# Patient Record
Sex: MALE | Race: WHITE | NOT HISPANIC OR LATINO | Employment: FULL TIME | URBAN - METROPOLITAN AREA
[De-identification: names, ages, dates, MRNs, and addresses within clinical notes are randomized per-mention and may not be internally consistent; named-entity substitution may affect disease eponyms.]

---

## 2021-03-30 ENCOUNTER — BULK ORDERING (OUTPATIENT)
Dept: CASE MANAGEMENT | Facility: OTHER | Age: 56
End: 2021-03-30

## 2021-03-30 DIAGNOSIS — Z23 IMMUNIZATION DUE: ICD-10-CM

## 2023-10-25 ENCOUNTER — HOSPITAL ENCOUNTER (INPATIENT)
Facility: HOSPITAL | Age: 58
LOS: 10 days | Discharge: HOME | DRG: 372 | End: 2023-11-04
Attending: EMERGENCY MEDICINE | Admitting: INTERNAL MEDICINE
Payer: COMMERCIAL

## 2023-10-25 DIAGNOSIS — N17.9 ACUTE RENAL FAILURE, UNSPECIFIED ACUTE RENAL FAILURE TYPE: Primary | ICD-10-CM

## 2023-10-25 DIAGNOSIS — K52.9 GASTROENTERITIS: ICD-10-CM

## 2023-10-25 PROBLEM — R19.7 DIARRHEA: Status: ACTIVE | Noted: 2023-10-25

## 2023-10-25 LAB
ADV 40+41 DNA STL QL NAA+NON-PROBE: NOT DETECTED
ALBUMIN SERPL-MCNC: 4.8 G/DL (ref 3.5–5.2)
ALBUMIN/GLOB SERPL: 1.1 G/DL
ALP SERPL-CCNC: 75 U/L (ref 39–117)
ALT SERPL W P-5'-P-CCNC: 31 U/L (ref 1–41)
ANION GAP SERPL CALCULATED.3IONS-SCNC: 17.5 MMOL/L (ref 5–15)
AST SERPL-CCNC: 17 U/L (ref 1–40)
ASTRO TYP 1-8 RNA STL QL NAA+NON-PROBE: NOT DETECTED
BASOPHILS # BLD AUTO: 0.01 10*3/MM3 (ref 0–0.2)
BASOPHILS NFR BLD AUTO: 0.1 % (ref 0–1.5)
BILIRUB SERPL-MCNC: 0.8 MG/DL (ref 0–1.2)
BUN SERPL-MCNC: 56 MG/DL (ref 6–20)
BUN/CREAT SERPL: 10.7 (ref 7–25)
C CAYETANENSIS DNA STL QL NAA+NON-PROBE: NOT DETECTED
C COLI+JEJ+UPSA DNA STL QL NAA+NON-PROBE: NOT DETECTED
CALCIUM SPEC-SCNC: 9.7 MG/DL (ref 8.6–10.5)
CHLORIDE SERPL-SCNC: 95 MMOL/L (ref 98–107)
CO2 SERPL-SCNC: 15.5 MMOL/L (ref 22–29)
CREAT SERPL-MCNC: 5.23 MG/DL (ref 0.76–1.27)
CRYPTOSP DNA STL QL NAA+NON-PROBE: NOT DETECTED
D-LACTATE SERPL-SCNC: 2 MMOL/L (ref 0.5–2)
D-LACTATE SERPL-SCNC: 2.7 MMOL/L (ref 0.5–2)
DEPRECATED RDW RBC AUTO: 43.3 FL (ref 37–54)
E HISTOLYT DNA STL QL NAA+NON-PROBE: NOT DETECTED
EAEC PAA PLAS AGGR+AATA ST NAA+NON-PRB: NOT DETECTED
EC STX1+STX2 GENES STL QL NAA+NON-PROBE: NOT DETECTED
EGFRCR SERPLBLD CKD-EPI 2021: 12.1 ML/MIN/1.73
EOSINOPHIL # BLD AUTO: 0 10*3/MM3 (ref 0–0.4)
EOSINOPHIL NFR BLD AUTO: 0 % (ref 0.3–6.2)
EPEC EAE GENE STL QL NAA+NON-PROBE: NOT DETECTED
ERYTHROCYTE [DISTWIDTH] IN BLOOD BY AUTOMATED COUNT: 12.9 % (ref 12.3–15.4)
ETEC LTA+ST1A+ST1B TOX ST NAA+NON-PROBE: NOT DETECTED
G LAMBLIA DNA STL QL NAA+NON-PROBE: NOT DETECTED
GLOBULIN UR ELPH-MCNC: 4.2 GM/DL
GLUCOSE SERPL-MCNC: 160 MG/DL (ref 65–99)
HCT VFR BLD AUTO: 53.7 % (ref 37.5–51)
HGB BLD-MCNC: 18.4 G/DL (ref 13–17.7)
IMM GRANULOCYTES # BLD AUTO: 0.03 10*3/MM3 (ref 0–0.05)
IMM GRANULOCYTES NFR BLD AUTO: 0.4 % (ref 0–0.5)
LIPASE SERPL-CCNC: 32 U/L (ref 13–60)
LYMPHOCYTES # BLD AUTO: 0.86 10*3/MM3 (ref 0.7–3.1)
LYMPHOCYTES NFR BLD AUTO: 10.3 % (ref 19.6–45.3)
MCH RBC QN AUTO: 30.6 PG (ref 26.6–33)
MCHC RBC AUTO-ENTMCNC: 34.3 G/DL (ref 31.5–35.7)
MCV RBC AUTO: 89.2 FL (ref 79–97)
MONOCYTES # BLD AUTO: 1.17 10*3/MM3 (ref 0.1–0.9)
MONOCYTES NFR BLD AUTO: 13.9 % (ref 5–12)
NEUTROPHILS NFR BLD AUTO: 6.32 10*3/MM3 (ref 1.7–7)
NEUTROPHILS NFR BLD AUTO: 75.3 % (ref 42.7–76)
NOROVIRUS GI+II RNA STL QL NAA+NON-PROBE: NOT DETECTED
P SHIGELLOIDES DNA STL QL NAA+NON-PROBE: NOT DETECTED
PLATELET # BLD AUTO: 360 10*3/MM3 (ref 140–450)
PMV BLD AUTO: 9.9 FL (ref 6–12)
POTASSIUM SERPL-SCNC: 3.4 MMOL/L (ref 3.5–5.2)
PROT SERPL-MCNC: 9 G/DL (ref 6–8.5)
RBC # BLD AUTO: 6.02 10*6/MM3 (ref 4.14–5.8)
RVA RNA STL QL NAA+NON-PROBE: NOT DETECTED
S ENT+BONG DNA STL QL NAA+NON-PROBE: DETECTED
SAPO I+II+IV+V RNA STL QL NAA+NON-PROBE: NOT DETECTED
SHIGELLA SP+EIEC IPAH ST NAA+NON-PROBE: NOT DETECTED
SODIUM SERPL-SCNC: 128 MMOL/L (ref 136–145)
V CHOL+PARA+VUL DNA STL QL NAA+NON-PROBE: NOT DETECTED
V CHOLERAE DNA STL QL NAA+NON-PROBE: NOT DETECTED
WBC NRBC COR # BLD: 8.39 10*3/MM3 (ref 3.4–10.8)
Y ENTEROCOL DNA STL QL NAA+NON-PROBE: NOT DETECTED

## 2023-10-25 PROCEDURE — 36415 COLL VENOUS BLD VENIPUNCTURE: CPT

## 2023-10-25 PROCEDURE — 87081 CULTURE SCREEN ONLY: CPT | Performed by: INTERNAL MEDICINE

## 2023-10-25 PROCEDURE — 83605 ASSAY OF LACTIC ACID: CPT | Performed by: PHYSICIAN ASSISTANT

## 2023-10-25 PROCEDURE — 83690 ASSAY OF LIPASE: CPT | Performed by: PHYSICIAN ASSISTANT

## 2023-10-25 PROCEDURE — 85025 COMPLETE CBC W/AUTO DIFF WBC: CPT | Performed by: PHYSICIAN ASSISTANT

## 2023-10-25 PROCEDURE — 25810000003 LACTATED RINGERS PER 1000 ML: Performed by: INTERNAL MEDICINE

## 2023-10-25 PROCEDURE — 99283 EMERGENCY DEPT VISIT LOW MDM: CPT | Performed by: PHYSICIAN ASSISTANT

## 2023-10-25 PROCEDURE — 80053 COMPREHEN METABOLIC PANEL: CPT | Performed by: PHYSICIAN ASSISTANT

## 2023-10-25 PROCEDURE — 99285 EMERGENCY DEPT VISIT HI MDM: CPT

## 2023-10-25 PROCEDURE — 25010000002 METOCLOPRAMIDE PER 10 MG: Performed by: PHYSICIAN ASSISTANT

## 2023-10-25 PROCEDURE — 87507 IADNA-DNA/RNA PROBE TQ 12-25: CPT | Performed by: INTERNAL MEDICINE

## 2023-10-25 PROCEDURE — 25810000003 LACTATED RINGERS PER 1000 ML: Performed by: PHYSICIAN ASSISTANT

## 2023-10-25 PROCEDURE — 25810000003 SODIUM CHLORIDE 0.9 % SOLUTION: Performed by: PHYSICIAN ASSISTANT

## 2023-10-25 PROCEDURE — 25010000002 DIPHENHYDRAMINE PER 50 MG: Performed by: PHYSICIAN ASSISTANT

## 2023-10-25 RX ORDER — METOCLOPRAMIDE HYDROCHLORIDE 5 MG/ML
10 INJECTION INTRAMUSCULAR; INTRAVENOUS ONCE
Status: COMPLETED | OUTPATIENT
Start: 2023-10-25 | End: 2023-10-25

## 2023-10-25 RX ORDER — DIPHENHYDRAMINE HYDROCHLORIDE 50 MG/ML
25 INJECTION INTRAMUSCULAR; INTRAVENOUS ONCE
Status: COMPLETED | OUTPATIENT
Start: 2023-10-25 | End: 2023-10-25

## 2023-10-25 RX ORDER — ONDANSETRON HYDROCHLORIDE 8 MG/1
8 TABLET, FILM COATED ORAL EVERY 8 HOURS PRN
COMMUNITY

## 2023-10-25 RX ORDER — ACETAMINOPHEN 650 MG/1
650 SUPPOSITORY RECTAL EVERY 4 HOURS PRN
Status: DISCONTINUED | OUTPATIENT
Start: 2023-10-25 | End: 2023-11-04 | Stop reason: HOSPADM

## 2023-10-25 RX ORDER — ACETAMINOPHEN 160 MG/5ML
650 SOLUTION ORAL EVERY 4 HOURS PRN
Status: DISCONTINUED | OUTPATIENT
Start: 2023-10-25 | End: 2023-11-04 | Stop reason: HOSPADM

## 2023-10-25 RX ORDER — SODIUM CHLORIDE 0.9 % (FLUSH) 0.9 %
10 SYRINGE (ML) INJECTION AS NEEDED
Status: DISCONTINUED | OUTPATIENT
Start: 2023-10-25 | End: 2023-11-04 | Stop reason: HOSPADM

## 2023-10-25 RX ORDER — HYDROCODONE BITARTRATE AND ACETAMINOPHEN 5; 325 MG/1; MG/1
1 TABLET ORAL EVERY 4 HOURS PRN
Status: ACTIVE | OUTPATIENT
Start: 2023-10-25 | End: 2023-11-01

## 2023-10-25 RX ORDER — SODIUM CHLORIDE, SODIUM LACTATE, POTASSIUM CHLORIDE, CALCIUM CHLORIDE 600; 310; 30; 20 MG/100ML; MG/100ML; MG/100ML; MG/100ML
1000 INJECTION, SOLUTION INTRAVENOUS ONCE
Status: COMPLETED | OUTPATIENT
Start: 2023-10-25 | End: 2023-10-25

## 2023-10-25 RX ORDER — ONDANSETRON 4 MG/1
4 TABLET, FILM COATED ORAL EVERY 6 HOURS PRN
Status: DISCONTINUED | OUTPATIENT
Start: 2023-10-25 | End: 2023-11-04 | Stop reason: HOSPADM

## 2023-10-25 RX ORDER — NITROGLYCERIN 0.4 MG/1
0.4 TABLET SUBLINGUAL
Status: DISCONTINUED | OUTPATIENT
Start: 2023-10-25 | End: 2023-11-04 | Stop reason: HOSPADM

## 2023-10-25 RX ORDER — SODIUM CHLORIDE 0.9 % (FLUSH) 0.9 %
10 SYRINGE (ML) INJECTION EVERY 12 HOURS SCHEDULED
Status: DISCONTINUED | OUTPATIENT
Start: 2023-10-25 | End: 2023-11-04 | Stop reason: HOSPADM

## 2023-10-25 RX ORDER — SODIUM CHLORIDE 9 MG/ML
40 INJECTION, SOLUTION INTRAVENOUS AS NEEDED
Status: DISCONTINUED | OUTPATIENT
Start: 2023-10-25 | End: 2023-11-04 | Stop reason: HOSPADM

## 2023-10-25 RX ORDER — SODIUM CHLORIDE, SODIUM LACTATE, POTASSIUM CHLORIDE, CALCIUM CHLORIDE 600; 310; 30; 20 MG/100ML; MG/100ML; MG/100ML; MG/100ML
100 INJECTION, SOLUTION INTRAVENOUS CONTINUOUS
Status: DISCONTINUED | OUTPATIENT
Start: 2023-10-25 | End: 2023-10-28

## 2023-10-25 RX ORDER — ONDANSETRON 2 MG/ML
4 INJECTION INTRAMUSCULAR; INTRAVENOUS EVERY 6 HOURS PRN
Status: DISCONTINUED | OUTPATIENT
Start: 2023-10-25 | End: 2023-11-04 | Stop reason: HOSPADM

## 2023-10-25 RX ORDER — ACETAMINOPHEN 325 MG/1
650 TABLET ORAL EVERY 4 HOURS PRN
Status: DISCONTINUED | OUTPATIENT
Start: 2023-10-25 | End: 2023-11-04 | Stop reason: HOSPADM

## 2023-10-25 RX ADMIN — DIPHENHYDRAMINE HYDROCHLORIDE 25 MG: 50 INJECTION, SOLUTION INTRAMUSCULAR; INTRAVENOUS at 13:41

## 2023-10-25 RX ADMIN — ACETAMINOPHEN 650 MG: 325 TABLET, FILM COATED ORAL at 20:43

## 2023-10-25 RX ADMIN — METOCLOPRAMIDE 10 MG: 5 INJECTION, SOLUTION INTRAMUSCULAR; INTRAVENOUS at 13:40

## 2023-10-25 RX ADMIN — SODIUM CHLORIDE, POTASSIUM CHLORIDE, SODIUM LACTATE AND CALCIUM CHLORIDE 100 ML/HR: 600; 310; 30; 20 INJECTION, SOLUTION INTRAVENOUS at 18:50

## 2023-10-25 RX ADMIN — SODIUM CHLORIDE 2000 ML: 9 INJECTION, SOLUTION INTRAVENOUS at 13:41

## 2023-10-25 RX ADMIN — SODIUM CHLORIDE, POTASSIUM CHLORIDE, SODIUM LACTATE AND CALCIUM CHLORIDE 1000 ML: 600; 310; 30; 20 INJECTION, SOLUTION INTRAVENOUS at 14:41

## 2023-10-25 NOTE — FS[facility] PROVIDER NOTE
Subjective   History of Present Illness  Patient is a healthy 58-year-old that has had vomiting and diarrhea that began 3 days ago.  He was given Zofran on a telehealth visit and that has helped with the vomiting tremendously.  He is having copious watery diarrhea.  He denies any recent travel or antibiotics.  He has never had C. difficile.  He has had normal cramping he would expect from gastroenteritis but says he is not having abnormal abdominal pain.  He feels very dehydrated and is having a lot of muscle aches especially in his legs.  He has had chills and sweats but has not checked for fever.  He says he is lost 12 pounds in these last 4 days.      Review of Systems   Constitutional:  Positive for chills and diaphoresis.   HENT: Negative.     Respiratory: Negative.     Cardiovascular: Negative.    Gastrointestinal:  Positive for diarrhea, nausea and vomiting. Negative for abdominal pain and blood in stool.   Genitourinary:  Positive for decreased urine volume.   Musculoskeletal:  Positive for myalgias.   Neurological:  Positive for weakness.   All other systems reviewed and are negative.      History reviewed. No pertinent past medical history.    No Known Allergies    Past Surgical History:   Procedure Laterality Date    FACIAL FRACTURE SURGERY      SHOULDER SURGERY         Family History   Problem Relation Age of Onset    Arthritis Father        Social History     Socioeconomic History    Marital status:    Tobacco Use    Smoking status: Never    Smokeless tobacco: Never   Substance and Sexual Activity    Alcohol use: Yes           Objective   Physical Exam  Constitutional:       Appearance: Normal appearance. He is normal weight.   HENT:      Head: Normocephalic.      Mouth/Throat:      Mouth: Mucous membranes are dry.   Cardiovascular:      Rate and Rhythm: Tachycardia present.   Pulmonary:      Effort: Pulmonary effort is normal.      Breath sounds: Normal breath sounds.   Abdominal:      General:  Bowel sounds are normal.      Tenderness: There is no abdominal tenderness. There is no right CVA tenderness, left CVA tenderness or guarding.   Musculoskeletal:         General: Normal range of motion.      Cervical back: Normal range of motion.   Skin:     General: Skin is warm and dry.   Neurological:      Mental Status: He is alert.   Psychiatric:         Mood and Affect: Mood normal.         Behavior: Behavior normal.         Procedures           ED Course                                           Medical Decision Making  Presentation patient appears shaky and dehydrated.  His heart rate is about 105.  Oxygen 96% on room air.  Blood pressure 130/95.  He is afebrile.  He is in renal failure.  Potassium is 3.4.  He appears hemoconcentrated.  He is not having any unusual abdominal pain and has a soft benign abdominal exam, CT not ordered.    I started him on 2 L normal saline initially and have switched that to LR beginning with the third liter.  He says the Benadryl and Reglan have helped.  He has some oral Zofran and asked if he could take that.  I told him he is welcome to for his own comfort but we also have IV Zofran.    He has no past medical history including no history of renal failure.  He sees Dr. Varghese.    Have spoken to Dr. Benjy Leon over the telephone and he accepts patient.  Patient is stable and is waiting for ambulance for transport.        Problems Addressed:  Acute renal failure, unspecified acute renal failure type: complicated acute illness or injury  Gastroenteritis: complicated acute illness or injury    Amount and/or Complexity of Data Reviewed  Labs: ordered.    Risk  Prescription drug management.  Decision regarding hospitalization.        Final diagnoses:   Acute renal failure, unspecified acute renal failure type   Gastroenteritis       ED Disposition  ED Disposition       ED Disposition   Decision to Admit    Condition   --    Comment   Level of Care: Telemetry [5]   Diagnosis: ARF  (acute renal failure) [135004]   Admitting Physician: JAZMIN DEUTSCH [[id number]]   Attending Physician: JAZMIN DEUTSCH [[id number]]   Isolate for COVID?: No [0]   Certification: I Certify That Inpatient Hospital Services Are Medically Necessary For Greater Than 2 Midnights                 No follow-up provider specified.       Medication List      No changes were made to your prescriptions during this visit.

## 2023-10-25 NOTE — PLAN OF CARE
Goal Outcome Evaluation:  Plan of Care Reviewed With: patient, spouse        Progress: no change  Outcome Evaluation: Admitted to the floor this shift with c/o NVD. Abdominal discomfort noted. Intermittent nausea, clear liquids provided. No skin issues noted. IVF. Regular diet. RA. A&Ox4. SOA with activity. Urinal at bedside. Awaiting urine and stool sample. VSS.

## 2023-10-25 NOTE — PROGRESS NOTES
Chart reviewed. Jerrod, hyponatremia, hypokalemia and metabolic acidosis. Likely secondary to dehydration. Agree with current ivf. Will f/u with labs in am

## 2023-10-26 PROBLEM — R11.2 NAUSEA AND VOMITING: Status: ACTIVE | Noted: 2023-10-26

## 2023-10-26 PROBLEM — E86.0 DEHYDRATION: Status: ACTIVE | Noted: 2023-10-26

## 2023-10-26 PROBLEM — A04.4 E COLI ENTERITIS: Status: ACTIVE | Noted: 2023-10-26

## 2023-10-26 LAB
ALBUMIN SERPL-MCNC: 4.3 G/DL (ref 3.5–5.2)
ALBUMIN/GLOB SERPL: 1.2 G/DL
ALP SERPL-CCNC: 58 U/L (ref 39–117)
ALT SERPL W P-5'-P-CCNC: 28 U/L (ref 1–41)
ANION GAP SERPL CALCULATED.3IONS-SCNC: 15.2 MMOL/L (ref 5–15)
AST SERPL-CCNC: 18 U/L (ref 1–40)
BACTERIA UR QL AUTO: ABNORMAL /HPF
BASOPHILS # BLD AUTO: 0.02 10*3/MM3 (ref 0–0.2)
BASOPHILS NFR BLD AUTO: 0.3 % (ref 0–1.5)
BILIRUB SERPL-MCNC: 0.9 MG/DL (ref 0–1.2)
BILIRUB UR QL STRIP: NEGATIVE
BUN SERPL-MCNC: 55 MG/DL (ref 6–20)
BUN/CREAT SERPL: 19.6 (ref 7–25)
CALCIUM SPEC-SCNC: 8.9 MG/DL (ref 8.6–10.5)
CHLORIDE SERPL-SCNC: 96 MMOL/L (ref 98–107)
CHLORIDE UR-SCNC: <20 MMOL/L
CK SERPL-CCNC: 163 U/L (ref 20–200)
CLARITY UR: CLEAR
CO2 SERPL-SCNC: 21.8 MMOL/L (ref 22–29)
COLOR UR: YELLOW
CREAT SERPL-MCNC: 2.81 MG/DL (ref 0.76–1.27)
CREAT UR-MCNC: 234.8 MG/DL
DEPRECATED RDW RBC AUTO: 43.4 FL (ref 37–54)
EGFRCR SERPLBLD CKD-EPI 2021: 25.6 ML/MIN/1.73
EOSINOPHIL # BLD AUTO: 0 10*3/MM3 (ref 0–0.4)
EOSINOPHIL NFR BLD AUTO: 0 % (ref 0.3–6.2)
ERYTHROCYTE [DISTWIDTH] IN BLOOD BY AUTOMATED COUNT: 13.3 % (ref 12.3–15.4)
GLOBULIN UR ELPH-MCNC: 3.5 GM/DL
GLUCOSE SERPL-MCNC: 114 MG/DL (ref 65–99)
GLUCOSE UR STRIP-MCNC: NEGATIVE MG/DL
HBA1C MFR BLD: 6 % (ref 4.8–5.6)
HCT VFR BLD AUTO: 48.8 % (ref 37.5–51)
HGB BLD-MCNC: 17 G/DL (ref 13–17.7)
HGB UR QL STRIP.AUTO: ABNORMAL
HYALINE CASTS UR QL AUTO: ABNORMAL /LPF
KETONES UR QL STRIP: NEGATIVE
LEUKOCYTE ESTERASE UR QL STRIP.AUTO: NEGATIVE
LYMPHOCYTES # BLD AUTO: 0.56 10*3/MM3 (ref 0.7–3.1)
LYMPHOCYTES NFR BLD AUTO: 7.6 % (ref 19.6–45.3)
MAGNESIUM SERPL-MCNC: 1.8 MG/DL (ref 1.6–2.6)
MCH RBC QN AUTO: 31.1 PG (ref 26.6–33)
MCHC RBC AUTO-ENTMCNC: 34.8 G/DL (ref 31.5–35.7)
MCV RBC AUTO: 89.4 FL (ref 79–97)
MONOCYTES # BLD AUTO: 1.1 10*3/MM3 (ref 0.1–0.9)
MONOCYTES NFR BLD AUTO: 15 % (ref 5–12)
MUCOUS THREADS URNS QL MICRO: ABNORMAL /HPF
NEUTROPHILS NFR BLD AUTO: 5.63 10*3/MM3 (ref 1.7–7)
NEUTROPHILS NFR BLD AUTO: 76.7 % (ref 42.7–76)
NITRITE UR QL STRIP: NEGATIVE
OSMOLALITY UR: 736 MOSM/KG
PH UR STRIP.AUTO: 5.5 [PH] (ref 5–8)
PHOSPHATE SERPL-MCNC: 4.4 MG/DL (ref 2.5–4.5)
PLATELET # BLD AUTO: 297 10*3/MM3 (ref 140–450)
PMV BLD AUTO: 10.2 FL (ref 6–12)
POTASSIUM SERPL-SCNC: 3.1 MMOL/L (ref 3.5–5.2)
POTASSIUM SERPL-SCNC: 3.2 MMOL/L (ref 3.5–5.2)
PROT ?TM UR-MCNC: 78.5 MG/DL
PROT SERPL-MCNC: 7.8 G/DL (ref 6–8.5)
PROT UR QL STRIP: ABNORMAL
RBC # BLD AUTO: 5.46 10*6/MM3 (ref 4.14–5.8)
RBC # UR STRIP: ABNORMAL /HPF
REF LAB TEST METHOD: ABNORMAL
SODIUM SERPL-SCNC: 133 MMOL/L (ref 136–145)
SODIUM UR-SCNC: 20 MMOL/L
SP GR UR STRIP: 1.02 (ref 1–1.03)
SQUAMOUS #/AREA URNS HPF: ABNORMAL /HPF
TSH SERPL DL<=0.05 MIU/L-ACNC: 2.1 UIU/ML (ref 0.27–4.2)
UROBILINOGEN UR QL STRIP: ABNORMAL
WBC # UR STRIP: ABNORMAL /HPF
WBC NRBC COR # BLD: 7.34 10*3/MM3 (ref 3.4–10.8)

## 2023-10-26 PROCEDURE — 82550 ASSAY OF CK (CPK): CPT | Performed by: INTERNAL MEDICINE

## 2023-10-26 PROCEDURE — 80050 GENERAL HEALTH PANEL: CPT | Performed by: INTERNAL MEDICINE

## 2023-10-26 PROCEDURE — 83036 HEMOGLOBIN GLYCOSYLATED A1C: CPT | Performed by: INTERNAL MEDICINE

## 2023-10-26 PROCEDURE — 87181 SC STD AGAR DILUTION PER AGT: CPT | Performed by: INTERNAL MEDICINE

## 2023-10-26 PROCEDURE — 84300 ASSAY OF URINE SODIUM: CPT | Performed by: INTERNAL MEDICINE

## 2023-10-26 PROCEDURE — 25010000002 HEPARIN (PORCINE) PER 1000 UNITS: Performed by: INTERNAL MEDICINE

## 2023-10-26 PROCEDURE — 81001 URINALYSIS AUTO W/SCOPE: CPT | Performed by: INTERNAL MEDICINE

## 2023-10-26 PROCEDURE — 84132 ASSAY OF SERUM POTASSIUM: CPT | Performed by: INTERNAL MEDICINE

## 2023-10-26 PROCEDURE — 84156 ASSAY OF PROTEIN URINE: CPT | Performed by: INTERNAL MEDICINE

## 2023-10-26 PROCEDURE — 84100 ASSAY OF PHOSPHORUS: CPT | Performed by: INTERNAL MEDICINE

## 2023-10-26 PROCEDURE — 87040 BLOOD CULTURE FOR BACTERIA: CPT | Performed by: INTERNAL MEDICINE

## 2023-10-26 PROCEDURE — 82570 ASSAY OF URINE CREATININE: CPT | Performed by: INTERNAL MEDICINE

## 2023-10-26 PROCEDURE — 82436 ASSAY OF URINE CHLORIDE: CPT | Performed by: INTERNAL MEDICINE

## 2023-10-26 PROCEDURE — 87150 DNA/RNA AMPLIFIED PROBE: CPT | Performed by: INTERNAL MEDICINE

## 2023-10-26 PROCEDURE — 83735 ASSAY OF MAGNESIUM: CPT | Performed by: INTERNAL MEDICINE

## 2023-10-26 PROCEDURE — 87077 CULTURE AEROBIC IDENTIFY: CPT | Performed by: INTERNAL MEDICINE

## 2023-10-26 PROCEDURE — 83935 ASSAY OF URINE OSMOLALITY: CPT | Performed by: INTERNAL MEDICINE

## 2023-10-26 PROCEDURE — 25810000003 LACTATED RINGERS PER 1000 ML: Performed by: INTERNAL MEDICINE

## 2023-10-26 PROCEDURE — 87186 SC STD MICRODIL/AGAR DIL: CPT | Performed by: INTERNAL MEDICINE

## 2023-10-26 RX ORDER — POTASSIUM CHLORIDE 750 MG/1
40 TABLET, FILM COATED, EXTENDED RELEASE ORAL ONCE
Status: COMPLETED | OUTPATIENT
Start: 2023-10-26 | End: 2023-10-26

## 2023-10-26 RX ORDER — HEPARIN SODIUM 5000 [USP'U]/ML
5000 INJECTION, SOLUTION INTRAVENOUS; SUBCUTANEOUS EVERY 8 HOURS SCHEDULED
Status: DISCONTINUED | OUTPATIENT
Start: 2023-10-26 | End: 2023-11-01

## 2023-10-26 RX ORDER — CHOLESTYRAMINE 4 G/9G
1 POWDER, FOR SUSPENSION ORAL EVERY 8 HOURS SCHEDULED
Status: DISCONTINUED | OUTPATIENT
Start: 2023-10-26 | End: 2023-10-29

## 2023-10-26 RX ADMIN — Medication 10 ML: at 00:02

## 2023-10-26 RX ADMIN — HEPARIN SODIUM 5000 UNITS: 5000 INJECTION INTRAVENOUS; SUBCUTANEOUS at 17:39

## 2023-10-26 RX ADMIN — CHOLESTYRAMINE 1 PACKET: 4 POWDER, FOR SUSPENSION ORAL at 18:56

## 2023-10-26 RX ADMIN — HEPARIN SODIUM 5000 UNITS: 5000 INJECTION INTRAVENOUS; SUBCUTANEOUS at 21:31

## 2023-10-26 RX ADMIN — Medication 10 ML: at 21:31

## 2023-10-26 RX ADMIN — POTASSIUM CHLORIDE 40 MEQ: 750 TABLET, EXTENDED RELEASE ORAL at 08:52

## 2023-10-26 RX ADMIN — SODIUM CHLORIDE, POTASSIUM CHLORIDE, SODIUM LACTATE AND CALCIUM CHLORIDE 100 ML/HR: 600; 310; 30; 20 INJECTION, SOLUTION INTRAVENOUS at 23:33

## 2023-10-26 RX ADMIN — Medication 10 ML: at 08:52

## 2023-10-26 RX ADMIN — SODIUM CHLORIDE, POTASSIUM CHLORIDE, SODIUM LACTATE AND CALCIUM CHLORIDE 100 ML/HR: 600; 310; 30; 20 INJECTION, SOLUTION INTRAVENOUS at 14:30

## 2023-10-26 RX ADMIN — SODIUM CHLORIDE, POTASSIUM CHLORIDE, SODIUM LACTATE AND CALCIUM CHLORIDE 100 ML/HR: 600; 310; 30; 20 INJECTION, SOLUTION INTRAVENOUS at 04:11

## 2023-10-26 RX ADMIN — POTASSIUM CHLORIDE 40 MEQ: 750 TABLET, EXTENDED RELEASE ORAL at 17:39

## 2023-10-26 NOTE — CONSULTS
[facility]                                                                                             Nephrology Initial Consult Note    Patient Identification:  Name: Luisito Burton MRN: [id number]  Age: 58 y.o. : 1965  Sex: male  Date:10/26/2023    Requesting Physician: As per consult order.  Reason for Consultation: Acute kidney injury, metabolic acidosis, dehydration  Information from:patient/ family/ chart      History of Present Illness: This is a 58 y.o. year old male with no prior known history of chronic kidney disease or evaluation by nephrology.  His creatinine was 1.1 in January which appears to be his baseline, 0.98 last year.  He is not on any home medications, does have a history of hypertension that is being managed by primary care.  Blood pressures been 1 30-1 40 at his recent office visits.  It appears patient has been reluctant to start antihypertensive medications.  He came to the ER last night complaining of diarrhea for the last 4 days, nonbloody associated with nausea vomiting and severely decreased oral intake, unable to get down liquids due to nausea.  Outpatient Zofran ineffective, also complained of muscle cramping.  He is not on any NSAIDs, no recent IV contrast, BP soft in the ER, 92/74 but improved with fluid boluses.  Initial labs showed a creatinine of 5.2, sodium 128, potassium 3.4 and CO2 15.  He was started on IV fluids overnight with marked improvement in his chemistries.  Creatinine today 2.8, lactate is decreasing.  Initial CBC showed hemoconcentration but that has improved as well with IV fluids.  UA showed some proteinuria but it was a very concentrated specimen and will need to be repeated.    Stool GI PCR was positive for Salmonella    The following medical history and medications personally reviewed by me:    Problem List:     ARF (acute renal failure)     Diarrhea      Past Medical History:  History reviewed. No pertinent past medical history.    Past Surgical History:  Past Surgical History:   Procedure Laterality Date    FACIAL FRACTURE SURGERY      SHOULDER SURGERY          Home Meds:   Medications Prior to Admission   Medication Sig Dispense Refill Last Dose    ondansetron (ZOFRAN) 8 MG tablet Take 1 tablet by mouth Every 8 (Eight) Hours As Needed for Nausea or Vomiting.   10/25/2023       Current Meds:   Current Facility-Administered Medications   Medication Dose Route Frequency Provider Last Rate Last Admin    acetaminophen (TYLENOL) tablet 650 mg  650 mg Oral Q4H PRN Benjy Guevara MD   650 mg at 10/25/23 2043    Or    acetaminophen (TYLENOL) 160 MG/5ML oral solution 650 mg  650 mg Oral Q4H PRN Benjy Guevara MD        Or    acetaminophen (TYLENOL) suppository 650 mg  650 mg Rectal Q4H PRN Benjy Guevara MD        HYDROcodone-acetaminophen (NORCO) 5-325 MG per tablet 1 tablet  1 tablet Oral Q4H PRN Benjy Guevara MD        lactated ringers infusion  100 mL/hr Intravenous Continuous Benjy Guevara  mL/hr at 10/26/23 0411 100 mL/hr at 10/26/23 0411    nitroglycerin (NITROSTAT) SL tablet 0.4 mg  0.4 mg Sublingual Q5 Min PRN Benjy Guevara MD        ondansetron (ZOFRAN) tablet 4 mg  4 mg Oral Q6H PRN Benjy Guevara MD        Or    ondansetron (ZOFRAN) injection 4 mg  4 mg Intravenous Q6H PRN Benjy Guevara MD        sodium chloride 0.9 % flush 10 mL  10 mL Intravenous Q12H Benjy Guevara MD   10 mL at 10/26/23 0002    sodium chloride 0.9 % flush 10 mL  10 mL Intravenous PRN Benjy Guevara MD        sodium chloride 0.9 % infusion 40 mL  40 mL Intravenous PRN Benjy Guevara MD           Allergies:  No Known Allergies    Social History:   Social History     Socioeconomic History    Marital status:    Tobacco Use    Smoking status: Never    Smokeless tobacco: Never   Vaping Use    Vaping Use: Never  "used   Substance and Sexual Activity    Alcohol use: Yes    Drug use: Never    Sexual activity: Defer        Family History:  Family History   Problem Relation Age of Onset    Arthritis Father         Review of Systems: as per HPI, in addition:    General:      Complains of weakness / fatigue,                       No fevers / chills                       no weight loss  HEENT:       no dysphagia / odynophagia  Neck:           normal range of motion, no swelling  Respiratory: no cough / congestion                      No shortness of air                       No wheezing  CV:              No chest pain                       No palpitations  Abdomen/GI: + Nausea vomiting and abdominal pain :             no dysuria / urinary frequency                       No urgency, normal output  Endocrine:   no polyuria / polydipsia,                      No heat or cold intolerance  Skin:           no rashes or skin breakdown   Vascular:   No edema                     No claudication  Psych:        no depression/ anxiety  Neuro:        no focal weakness, no seizures  Musculoskeletal: no joint pain or deformities      Physical Exam:  Vitals:   Temp (24hrs), Av.8 °F (36.6 °C), Min:97.5 °F (36.4 °C), Max:98.4 °F (36.9 °C)    /75 (BP Location: Right arm, Patient Position: Lying)   Pulse 111   Temp 98.4 °F (36.9 °C) (Oral)   Resp 18   Ht 165.1 cm (65\")   Wt 77.1 kg (170 lb)   SpO2 94%   BMI 28.29 kg/m²   Intake/Output:   No intake or output data in the 24 hours ending 10/26/23 0808     Wt Readings from Last 1 Encounters:   10/25/23 1304 77.1 kg (170 lb)       Exam:    General Appearance:  Awake, alert, oriented x3, no acute distress  Mildly ill-appearing   Head and Face:  Normocephalic, atraumatic, mucus membranes moist, oropharynx clear   Eyes:  No icterus, pupils equal round and reactive to light, extraocular movements intact    ENMT: Moist mucosa, tongue symmetric    Neck: Supple  no jugular venous distention  no " thyromegaly   Pulmonary:  Respiratory effort: Normal  Auscultation of lungs: Clear bilaterally  No wheezes  No rhonchi  Good air movement, good expansion   Chest wall:  No tenderness or deformity   Cardiovascular:  Auscultation of the heart: Normal rhythm, no murmurs  No edema of bilateral lower extremities   Abdomen:  Abdomen: soft, non-tender, normal bowel sounds all four quadrants, no masses   Liver and spleen: no hepatosplenomegaly   Musculoskeletal: Digits and nails: normal  Normal range of motion  No joint swelling or gross deformities    Skin: Skin inspection: color normal, no visible rashes or lesions  Skin palpation: texture, turgor normal, no palpable lesions   Lymphatic:  no cervical lymphadenopathy    Psychiatric: Judgement and insight: normal  Orientation to person place and time: normal  Mood and affect: normal       DATA:  Radiology and Labs:  The following labs independently reviewed by me, additional AM labs ordered  Old records independently reviewed showing normal baseline creatinine around 1.0  The following radiologic studies independently viewed by me, findings chest x-ray showed low lung volumes, atelectasis  Interval notes, chart personally reviewed by me.  I have reviewed and summarized old records as detailed above  Plan of care discussed with patient himself at bedside  New problems include acute renal failure, metabolic acidosis, hypokalemia    Dialysis patient access: Not applicable    Risk/ complexity of medical care/ medical decision making: High complexity, severe renal failure with severe electrolyte abnormalities        Labs:   Recent Results (from the past 24 hour(s))   Comprehensive Metabolic Panel    Collection Time: 10/25/23  1:18 PM    Specimen: Blood   Result Value Ref Range    Glucose 160 (H) 65 - 99 mg/dL    BUN 56 (H) 6 - 20 mg/dL    Creatinine 5.23 (H) 0.76 - 1.27 mg/dL    Sodium 128 (L) 136 - 145 mmol/L    Potassium 3.4 (L) 3.5 - 5.2 mmol/L    Chloride 95 (L) 98 - 107  mmol/L    CO2 15.5 (L) 22.0 - 29.0 mmol/L    Calcium 9.7 8.6 - 10.5 mg/dL    Total Protein 9.0 (H) 6.0 - 8.5 g/dL    Albumin 4.8 3.5 - 5.2 g/dL    ALT (SGPT) 31 1 - 41 U/L    AST (SGOT) 17 1 - 40 U/L    Alkaline Phosphatase 75 39 - 117 U/L    Total Bilirubin 0.8 0.0 - 1.2 mg/dL    Globulin 4.2 gm/dL    A/G Ratio 1.1 g/dL    BUN/Creatinine Ratio 10.7 7.0 - 25.0    Anion Gap 17.5 (H) 5.0 - 15.0 mmol/L    eGFR 12.1 (L) >60.0 mL/min/1.73   Lipase    Collection Time: 10/25/23  1:18 PM    Specimen: Blood   Result Value Ref Range    Lipase 32 13 - 60 U/L   CBC Auto Differential    Collection Time: 10/25/23  1:18 PM    Specimen: Blood   Result Value Ref Range    WBC 8.39 3.40 - 10.80 10*3/mm3    RBC 6.02 (H) 4.14 - 5.80 10*6/mm3    Hemoglobin 18.4 (H) 13.0 - 17.7 g/dL    Hematocrit 53.7 (H) 37.5 - 51.0 %    MCV 89.2 79.0 - 97.0 fL    MCH 30.6 26.6 - 33.0 pg    MCHC 34.3 31.5 - 35.7 g/dL    RDW 12.9 12.3 - 15.4 %    RDW-SD 43.3 37.0 - 54.0 fl    MPV 9.9 6.0 - 12.0 fL    Platelets 360 140 - 450 10*3/mm3    Neutrophil % 75.3 42.7 - 76.0 %    Lymphocyte % 10.3 (L) 19.6 - 45.3 %    Monocyte % 13.9 (H) 5.0 - 12.0 %    Eosinophil % 0.0 (L) 0.3 - 6.2 %    Basophil % 0.1 0.0 - 1.5 %    Immature Grans % 0.4 0.0 - 0.5 %    Neutrophils, Absolute 6.32 1.70 - 7.00 10*3/mm3    Lymphocytes, Absolute 0.86 0.70 - 3.10 10*3/mm3    Monocytes, Absolute 1.17 (H) 0.10 - 0.90 10*3/mm3    Eosinophils, Absolute 0.00 0.00 - 0.40 10*3/mm3    Basophils, Absolute 0.01 0.00 - 0.20 10*3/mm3    Immature Grans, Absolute 0.03 0.00 - 0.05 10*3/mm3   Lactic Acid, Plasma    Collection Time: 10/25/23  2:41 PM    Specimen: Blood   Result Value Ref Range    Lactate 2.7 (C) 0.5 - 2.0 mmol/L   STAT Lactic Acid, Reflex    Collection Time: 10/25/23  6:25 PM    Specimen: Blood   Result Value Ref Range    Lactate 2.0 0.5 - 2.0 mmol/L   Gastrointestinal Panel, PCR - Stool, Per Rectum    Collection Time: 10/25/23  8:07 PM    Specimen: Per Rectum; Stool   Result Value Ref  Range    Campylobacter Not Detected Not Detected    Plesiomonas shigelloides Not Detected Not Detected    Salmonella Detected (A) Not Detected    Vibrio Not Detected Not Detected    Vibrio cholerae Not Detected Not Detected    Yersinia enterocolitica Not Detected Not Detected    Enteroaggregative E. coli (EAEC) Not Detected Not Detected    Enteropathogenic E. coli (EPEC) Not Detected Not Detected    Enterotoxigenic E. coli (ETEC) lt/st Not Detected Not Detected    Shiga-like toxin-producing E. coli (STEC) stx1/stx2 Not Detected Not Detected    Shigella/Enteroinvasive E. coli (EIEC) Not Detected Not Detected    Cryptosporidium Not Detected Not Detected    Cyclospora cayetanensis Not Detected Not Detected    Entamoeba histolytica Not Detected Not Detected    Giardia lamblia Not Detected Not Detected    Adenovirus F40/41 Not Detected Not Detected    Astrovirus Not Detected Not Detected    Norovirus GI/GII Not Detected Not Detected    Rotavirus A Not Detected Not Detected    Sapovirus (I, II, IV or V) Not Detected Not Detected   Urinalysis With Microscopic If Indicated (No Culture) - Urine, Clean Catch    Collection Time: 10/26/23  4:30 AM    Specimen: Urine, Clean Catch   Result Value Ref Range    Color, UA Yellow Yellow, Straw    Appearance, UA Clear Clear    pH, UA 5.5 5.0 - 8.0    Specific Gravity, UA 1.025 1.005 - 1.030    Glucose, UA Negative Negative    Ketones, UA Negative Negative    Bilirubin, UA Negative Negative    Blood, UA Moderate (2+) (A) Negative    Protein,  mg/dL (2+) (A) Negative    Leuk Esterase, UA Negative Negative    Nitrite, UA Negative Negative    Urobilinogen, UA 0.2 E.U./dL 0.2 - 1.0 E.U./dL   Sodium, Urine, Random - Urine, Clean Catch    Collection Time: 10/26/23  4:30 AM    Specimen: Urine, Clean Catch   Result Value Ref Range    Sodium, Urine 20 mmol/L   Creatinine Urine Random (kidney function) GFR component - Urine, Clean Catch    Collection Time: 10/26/23  4:30 AM    Specimen:  Urine, Clean Catch   Result Value Ref Range    Creatinine, Urine 234.8 mg/dL   Osmolality, Urine - Urine, Clean Catch    Collection Time: 10/26/23  4:30 AM    Specimen: Urine, Clean Catch   Result Value Ref Range    Osmolality, Urine 736 mOsm/kg   Urinalysis, Microscopic Only - Urine, Clean Catch    Collection Time: 10/26/23  4:30 AM    Specimen: Urine, Clean Catch   Result Value Ref Range    RBC, UA 0-2 None Seen, 0-2 /HPF    WBC, UA None Seen None Seen, 0-2 /HPF    Bacteria, UA Trace (A) None Seen /HPF    Squamous Epithelial Cells, UA 0-2 None Seen, 0-2 /HPF    Hyaline Casts, UA 0-2 None Seen /LPF    Mucus, UA Trace None Seen, Trace /HPF    Methodology Manual Light Microscopy    Comprehensive Metabolic Panel    Collection Time: 10/26/23  5:52 AM    Specimen: Blood   Result Value Ref Range    Glucose 114 (H) 65 - 99 mg/dL    BUN 55 (H) 6 - 20 mg/dL    Creatinine 2.81 (H) 0.76 - 1.27 mg/dL    Sodium 133 (L) 136 - 145 mmol/L    Potassium 3.1 (L) 3.5 - 5.2 mmol/L    Chloride 96 (L) 98 - 107 mmol/L    CO2 21.8 (L) 22.0 - 29.0 mmol/L    Calcium 8.9 8.6 - 10.5 mg/dL    Total Protein 7.8 6.0 - 8.5 g/dL    Albumin 4.3 3.5 - 5.2 g/dL    ALT (SGPT) 28 1 - 41 U/L    AST (SGOT) 18 1 - 40 U/L    Alkaline Phosphatase 58 39 - 117 U/L    Total Bilirubin 0.9 0.0 - 1.2 mg/dL    Globulin 3.5 gm/dL    A/G Ratio 1.2 g/dL    BUN/Creatinine Ratio 19.6 7.0 - 25.0    Anion Gap 15.2 (H) 5.0 - 15.0 mmol/L    eGFR 25.6 (L) >60.0 mL/min/1.73   CBC Auto Differential    Collection Time: 10/26/23  5:52 AM    Specimen: Blood   Result Value Ref Range    WBC 7.34 3.40 - 10.80 10*3/mm3    RBC 5.46 4.14 - 5.80 10*6/mm3    Hemoglobin 17.0 13.0 - 17.7 g/dL    Hematocrit 48.8 37.5 - 51.0 %    MCV 89.4 79.0 - 97.0 fL    MCH 31.1 26.6 - 33.0 pg    MCHC 34.8 31.5 - 35.7 g/dL    RDW 13.3 12.3 - 15.4 %    RDW-SD 43.4 37.0 - 54.0 fl    MPV 10.2 6.0 - 12.0 fL    Platelets 297 140 - 450 10*3/mm3    Neutrophil % 76.7 (H) 42.7 - 76.0 %    Lymphocyte % 7.6 (L) 19.6  - 45.3 %    Monocyte % 15.0 (H) 5.0 - 12.0 %    Eosinophil % 0.0 (L) 0.3 - 6.2 %    Basophil % 0.3 0.0 - 1.5 %    Neutrophils, Absolute 5.63 1.70 - 7.00 10*3/mm3    Lymphocytes, Absolute 0.56 (L) 0.70 - 3.10 10*3/mm3    Monocytes, Absolute 1.10 (H) 0.10 - 0.90 10*3/mm3    Eosinophils, Absolute 0.00 0.00 - 0.40 10*3/mm3    Basophils, Absolute 0.02 0.00 - 0.20 10*3/mm3   Magnesium    Collection Time: 10/26/23  5:52 AM    Specimen: Blood   Result Value Ref Range    Magnesium 1.8 1.6 - 2.6 mg/dL   Phosphorus    Collection Time: 10/26/23  5:52 AM    Specimen: Blood   Result Value Ref Range    Phosphorus 4.4 2.5 - 4.5 mg/dL   Hemoglobin A1c    Collection Time: 10/26/23  5:52 AM    Specimen: Blood   Result Value Ref Range    Hemoglobin A1C 6.00 (H) 4.80 - 5.60 %   TSH Rfx On Abnormal To Free T4    Collection Time: 10/26/23  5:52 AM    Specimen: Blood   Result Value Ref Range    TSH 2.100 0.270 - 4.200 uIU/mL       Radiology:  Imaging Results (Last 24 Hours)       ** No results found for the last 24 hours. **                 ASSESSMENT:   New, severe renal failure.  This all appears prerenal.  Initial chemistries show hemoconcentration and labs significantly improved overnight with IV fluids.  Nausea vomiting and diarrhea  Salmonella infection  Hypokalemia from GI losses  Metabolic acidosis from renal failure and lactic acidosis, improving  Proteinuria, recheck after hydration  History of hypertension, not on meds  Severe dehydration  Hypovolemic hyponatremia, improved with IV fluids      DISCUSSION/PLAN:   Renal function significantly improved since yesterday afternoon  Suspect this is all severe prerenal azotemia related to his GI symptoms and presumed Salmonella infection  Replace potassium orally  Acidosis and hyponatremia are improved with current IV fluids so we will keep on LR  No need for renal imaging at this time but will consider if creatinine fails to improve further  We will recheck proteinuria after  hydration  Continue to monitor volume and electrolytes closely including daily magnesium and phosphorus levels    avoid IV contrast and nephrotoxic medications     I appreciate the consult request  Please send me a secure chat message if any questions regarding patient care.      Hao Allen MD  [facility]  Office phone number: [phone]  Answering service phone number: [phone]      10/26/2023        Dictation via Dragon dictation software

## 2023-10-26 NOTE — PROGRESS NOTES
Name: Luisito Burton ADMIT: 10/25/2023   : 1965  PCP: Provider, No Known    MRN: [id number] LOS: 1 days   AGE/SEX: 58 y.o. male  ROOM: [facility]     Subjective   Subjective   Patient continues with watery diarrhea without fresh bright blood per rectum or melena.  Positive abdominal colic.  No nausea or vomiting.  Improving appetite and tolerating some p.o.  No fever or chills.    Review of Systems  .  Decreased urine output.  No dysuria or hematuria.  Cardio vascular/respiratory.  No chest pain/no shortness of breath/positive occasional palpitations/no cough  CNS.  No dizziness.  No loss of consciousness.  No focal neurological symptoms.     Objective   Objective   Vital Signs  Temp:  [97.6 °F (36.4 °C)-98.4 °F (36.9 °C)] 98.1 °F (36.7 °C)  Heart Rate:  [] 107  Resp:  [16-18] 18  BP: ()/(73-86) 117/79  SpO2:  [94 %-99 %] 94 %  on   ;   Device (Oxygen Therapy): room air    Intake/Output Summary (Last 24 hours) at 10/26/2023 1533  Last data filed at 10/26/2023 1307  Gross per 24 hour   Intake 480 ml   Output 300 ml   Net 180 ml     Body mass index is 28.29 kg/m².      10/25/23  1304   Weight: 77.1 kg (170 lb)     Physical Exam  General.  Middle-aged gentleman.  Alert and oriented x3.  No apparent pain/distress/diaphoresis.  Normal mood and affect.  Eyes.  Pupils equal round and reactive.  Intact extraocular musculature.  No pallor or jaundice.  Oral cavity.  Mildly dry mucous membrane.  Neck.  Supple.  No JVD.  No lymphadenopathy or thyromegaly.  Cardiovascular.  Regular rate and rhythm.  Mild tachycardia.  No murmurs.  Chest.  Clear to auscultation bilaterally with no added sounds.    Abdomen.  Soft lax.  Mildly distended.  Normal bowel sounds.  No tenderness.  No guarding or rebound.  Extremities.  No clubbing/cyanosis/edema.    CNS.  No acute focal neurological deficits.        Results Review:      Results from last 7 days   Lab Units 10/26/23  0552 10/25/23  1318   SODIUM mmol/L 133* 128*  "  POTASSIUM mmol/L 3.1* 3.4*   CHLORIDE mmol/L 96* 95*   CO2 mmol/L 21.8* 15.5*   BUN mg/dL 55* 56*   CREATININE mg/dL 2.81* 5.23*   GLUCOSE mg/dL 114* 160*   CALCIUM mg/dL 8.9 9.7   AST (SGOT) U/L 18 17   ALT (SGPT) U/L 28 31     Estimated Creatinine Clearance: 28.1 mL/min (A) (by C-G formula based on SCr of 2.81 mg/dL (H)).  Results from last 7 days   Lab Units 10/26/23  0552   HEMOGLOBIN A1C % 6.00*         Results from last 7 days   Lab Units 10/26/23  0552   CK TOTAL U/L 163         Results from last 7 days   Lab Units 10/26/23  0552   TSH uIU/mL 2.100     Results from last 7 days   Lab Units 10/26/23  0552   MAGNESIUM mg/dL 1.8   PHOSPHORUS mg/dL 4.4           Invalid input(s): \"LDLCALC\"  Results from last 7 days   Lab Units 10/26/23  0552 10/25/23  1318   WBC 10*3/mm3 7.34 8.39   HEMOGLOBIN g/dL 17.0 18.4*   HEMATOCRIT % 48.8 53.7*   PLATELETS 10*3/mm3 297 360   MCV fL 89.4 89.2   MCH pg 31.1 30.6   MCHC g/dL 34.8 34.3   RDW % 13.3 12.9   RDW-SD fl 43.4 43.3   MPV fL 10.2 9.9   NEUTROPHIL % % 76.7* 75.3   LYMPHOCYTE % % 7.6* 10.3*   MONOCYTES % % 15.0* 13.9*   EOSINOPHIL % % 0.0* 0.0*   BASOPHIL % % 0.3 0.1   IMM GRAN % %  --  0.4   NEUTROS ABS 10*3/mm3 5.63 6.32   LYMPHS ABS 10*3/mm3 0.56* 0.86   MONOS ABS 10*3/mm3 1.10* 1.17*   EOS ABS 10*3/mm3 0.00 0.00   BASOS ABS 10*3/mm3 0.02 0.01   IMMATURE GRANS (ABS) 10*3/mm3  --  0.03             Results from last 7 days   Lab Units 10/25/23  1825 10/25/23  1441   LACTATE mmol/L 2.0 2.7*         Results from last 7 days   Lab Units 10/25/23  1318   LIPASE U/L 32         Results from last 7 days   Lab Units 10/25/23  2007   ADENOVIRUS  Not Detected     Results from last 7 days   Lab Units 10/26/23  0430   NITRITE UA  Negative   WBC UA /HPF None Seen   BACTERIA UA /HPF Trace*   SQUAM EPITHEL UA /HPF 0-2     Results from last 7 days   Lab Units 10/26/23  0430   SODIUM UR mmol/L 20   CREATININE UR mg/dL 234.8   CHLORIDE UR mmol/L <20   OSMOLALITY UR mOsm/kg 736 "   PROTEIN TOTAL URINE mg/dL 78.5       Imaging:  Imaging Results (Last 24 Hours)       ** No results found for the last 24 hours. **               I reviewed the patient's new clinical results / labs / tests / procedures      Assessment/Plan     Active Hospital Problems    Diagnosis  POA    **ARF (acute renal failure) [N17.9]  Yes    Nausea and vomiting [R11.2]  Yes    E coli enteritis [A04.4]  Yes    Dehydration [E86.0]  Yes    Diarrhea [R19.7]  Yes      Resolved Hospital Problems   No resolved problems to display.           Salmonella enteritis leading to nausea and vomiting and diarrhea.  Benign GI examination.  Low suspicion for C. difficile.  Resolved nausea and vomiting.  Continues with diarrhea.  Will continue IV fluids/IV Zofran.  Will initiate cholestyramine.  No need for antibiotic.  Check blood cultures.  Normal liver function test.  Acute kidney failure/hypokalemia/metabolic acidosis mostly secondary to prerenal azotemia leading to hypovolemia and subsequent acute tubular necrosis.  Improving on IV fluid.  Good urine output.  Appreciate nephrology feedback.  We will continue to monitor.  Hypokalemia being substituted.  Magnesium is normal.  Acidosis is improving and is most likely secondary to GI bicarbonate loss.  Prediabetes.  Diet at discharge.  VTE prophylaxis.  Subcu heparin    Discussed my findings and plan of treatment with the patient/family/nurses at multidisciplinary rounds.  Disposition.  To be determined based on clinical course.  Probably home in 2 days if continues to improve.        Corie West MD  [facility]  10/26/23  15:33 EDT

## 2023-10-26 NOTE — PAYOR COMM NOTE
"Luisito Mae (58 y.o. Male)     PLEASE SEE ATTACHED FOR INPT AUTH     REF #   [id number]    PLEASE CALL SANDRA KUHN RN/ DEPT @ [phone]  OR FAX  DEPARTMENT @  [phone]    THANK YOU   SANDRA KUHN RN  [facility]           Date of Birth   1965    Social Security Number       Address   [address] [address]    Home Phone   [phone]    MRN   [id number]       Latter day   None    Marital Status                               Admission Date   10/25/23    Admission Type   Urgent    Admitting Provider   Benjy Guevara MD    Attending Provider   Corie West MD    Department, Room/Bed   [address], S609/1       Discharge Date       Discharge Disposition       Discharge Destination                                 Attending Provider: Corie West MD    Allergies: No Known Allergies    Isolation: None   Infection: Salmonella  (10/25/23)   Code Status: CPR    Ht: 165.1 cm (65\")   Wt: 77.1 kg (170 lb)    Admission Cmt: None   Principal Problem: ARF (acute renal failure) [N17.9]                   Active Insurance as of 10/25/2023       Primary Coverage       Payor Plan Insurance Group Employer/Plan Group    [facility] [employer] [employer] [id number]       Payor Plan Address Payor Plan Phone Number Payor Plan Fax Number Effective Dates    [address] [phone]  1/1/2015 - None Entered    [place] [address]         Subscriber Name Subscriber Birth Date Member ID       LUISITO MAE 1965 [id number]                     Emergency Contacts        (Rel.) Home Phone Work Phone Mobile Phone    Nemo Mae -- -- [phone]              [place]: [facility] [id number]  Tax ID [id number]     History & Physical        Benjy Guevara MD at 10/25/23 2100              Patient Name:  Luisito Mae  YOB: 1965  MRN:  [id number]  Admit Date:  10/25/2023  Patient Care " Team:  Provider, No Known as PCP - General      Subjective  History Present Illness     Chief Complaint   Patient presents with    Nausea    Vomiting    Diarrhea       Mr. Burton is a 58 y.o. who presents to [facility] complaining of diarrhea for about 4 days. He reports frequent nonbloody diarrhea starting Sunday. He then developed nausea and vomiting and was not able to keep food or liquids down due to the nausea. He received zofran as outpatient and this helped the nausea but then he began to have muscle cramping. The cramping was whole body but mostly in his legs and feet. He presented to ER and was found to have MIALGRO and electrolyte abnormalities. He also had metabolic acidosis. He is not having abdominal pain currently. Not having fever. No dysuria or flank pain reported. He reports that he does have occasional issue with dairy but he has not had any recently.    Review of Systems   Constitutional:  Negative for chills and fever.   HENT:  Negative for trouble swallowing.    Eyes:  Negative for pain and visual disturbance.   Respiratory:  Negative for cough and shortness of breath.    Cardiovascular:  Negative for chest pain and palpitations.   Gastrointestinal:  Positive for diarrhea, nausea and vomiting. Negative for constipation.   Endocrine: Negative for cold intolerance and heat intolerance.   Genitourinary:  Negative for dysuria and flank pain.   Musculoskeletal:  Negative for neck pain and neck stiffness.   Skin:  Negative for pallor and rash.   Allergic/Immunologic: Negative for environmental allergies.   Neurological:  Negative for seizures and syncope.   Hematological:  Negative for adenopathy. Does not bruise/bleed easily.   Psychiatric/Behavioral:  Negative for agitation and confusion.         Personal History     History reviewed. No pertinent past medical history.  Past Surgical History:   Procedure Laterality Date    FACIAL FRACTURE SURGERY      SHOULDER SURGERY       Family  History   Problem Relation Age of Onset    Arthritis Father      Social History     Tobacco Use    Smoking status: Never    Smokeless tobacco: Never   Vaping Use    Vaping Use: Never used   Substance Use Topics    Alcohol use: Yes    Drug use: Never     No current facility-administered medications on file prior to encounter.     Current Outpatient Medications on File Prior to Encounter   Medication Sig Dispense Refill    ondansetron (ZOFRAN) 8 MG tablet Take 1 tablet by mouth Every 8 (Eight) Hours As Needed for Nausea or Vomiting.       No Known Allergies    Objective   Objective     Vital Signs  Temp:  [97.5 °F (36.4 °C)-97.8 °F (36.6 °C)] 97.8 °F (36.6 °C)  Heart Rate:  [] 96  Resp:  [16-18] 16  BP: (110-130)/(73-95) 116/73  SpO2:  [92 %-99 %] 99 %  on   ;      Body mass index is 28.29 kg/m².    Physical Exam  Vitals and nursing note reviewed.   Constitutional:       General: He is not in acute distress.     Appearance: He is not diaphoretic.   HENT:      Head: Atraumatic.   Eyes:      General: No scleral icterus.     Pupils: Pupils are equal, round, and reactive to light.   Cardiovascular:      Rate and Rhythm: Normal rate and regular rhythm.      Pulses: Normal pulses.   Pulmonary:      Effort: Pulmonary effort is normal.      Breath sounds: No wheezing.   Abdominal:      General: There is no distension.      Palpations: Abdomen is soft.      Tenderness: There is no abdominal tenderness. There is no guarding or rebound.   Musculoskeletal:         General: No swelling or tenderness.      Cervical back: Neck supple.   Skin:     General: Skin is warm and dry.   Neurological:      Mental Status: He is alert.      Cranial Nerves: No cranial nerve deficit.   Psychiatric:         Mood and Affect: Mood normal.         Behavior: Behavior normal.         Results Review:  I reviewed the patient's new clinical results.  I reviewed prior records.    Lab Results (last 24 hours)       Procedure Component Value Units  Date/Time    CBC & Differential [[id number]]  (Abnormal) Collected: 10/25/23 1318    Specimen: Blood Updated: 10/25/23 1335    Narrative:      The following orders were created for panel order CBC & Differential.  Procedure                               Abnormality         Status                     ---------                               -----------         ------                     CBC Auto Differential[[id number]]        Abnormal            Final result                 Please view results for these tests on the individual orders.    Comprehensive Metabolic Panel [[id number]]  (Abnormal) Collected: 10/25/23 1318    Specimen: Blood Updated: 10/25/23 1343     Glucose 160 mg/dL      BUN 56 mg/dL      Creatinine 5.23 mg/dL      Sodium 128 mmol/L      Potassium 3.4 mmol/L      Chloride 95 mmol/L      CO2 15.5 mmol/L      Calcium 9.7 mg/dL      Total Protein 9.0 g/dL      Albumin 4.8 g/dL      ALT (SGPT) 31 U/L      AST (SGOT) 17 U/L      Alkaline Phosphatase 75 U/L      Total Bilirubin 0.8 mg/dL      Globulin 4.2 gm/dL      A/G Ratio 1.1 g/dL      BUN/Creatinine Ratio 10.7     Anion Gap 17.5 mmol/L      eGFR 12.1 mL/min/1.73      Comment: <15 Indicative of kidney failure       Narrative:      GFR Normal >60  Chronic Kidney Disease <60  Kidney Failure <15      Lipase [[id number]]  (Normal) Collected: 10/25/23 1318    Specimen: Blood Updated: 10/25/23 1343     Lipase 32 U/L     CBC Auto Differential [[id number]]  (Abnormal) Collected: 10/25/23 1318    Specimen: Blood Updated: 10/25/23 1335     WBC 8.39 10*3/mm3      RBC 6.02 10*6/mm3      Hemoglobin 18.4 g/dL      Hematocrit 53.7 %      MCV 89.2 fL      MCH 30.6 pg      MCHC 34.3 g/dL      RDW 12.9 %      RDW-SD 43.3 fl      MPV 9.9 fL      Platelets 360 10*3/mm3      Neutrophil % 75.3 %      Lymphocyte % 10.3 %      Monocyte % 13.9 %      Eosinophil % 0.0 %      Basophil % 0.1 %      Immature Grans % 0.4 %      Neutrophils, Absolute 6.32 10*3/mm3      Lymphocytes, Absolute  0.86 10*3/mm3      Monocytes, Absolute 1.17 10*3/mm3      Eosinophils, Absolute 0.00 10*3/mm3      Basophils, Absolute 0.01 10*3/mm3      Immature Grans, Absolute 0.03 10*3/mm3     Lactic Acid, Plasma [[id number]]  (Abnormal) Collected: 10/25/23 1441    Specimen: Blood Updated: 10/25/23 1506     Lactate 2.7 mmol/L     STAT Lactic Acid, Reflex [[id number]]  (Normal) Collected: 10/25/23 1825    Specimen: Blood Updated: 10/25/23 1915     Lactate 2.0 mmol/L     Gastrointestinal Panel, PCR - Stool, Per Rectum [[id number]] Collected: 10/25/23 2007    Specimen: Stool from Per Rectum Updated: 10/25/23 2055            Imaging Results (Last 24 Hours)       ** No results found for the last 24 hours. **                SCANNED - TELEMETRY     Final Result           Assessment/Plan     Active Hospital Problems    Diagnosis  POA    **ARF (acute renal failure) [N17.9]  Yes    Diarrhea [R19.7]  Yes      Resolved Hospital Problems   No resolved problems to display.       Mr. Burton is a 58 y.o.     Diarrhea: Gastroenteritis likely.  GI PCR is pending. No abdominal pain currently. His lactic acidosis has improved. Continue supportive care. Additional workup depending on symptom progression and pcr results. Lactose controlled diet trial.  MILAGRO: IVF infusion. Have ordered urine studies. Will consult nephrology.  Prerenal etiology based on history.  Hypokalemia/Hyponatremia/Metabolic Acidosis: Monitoring.  Hyperglycemia: Random is less than 200. Will monitor and check A1c.  PPx: SCD  I discussed the patient's findings and my recommendations with patient, family, and ED provider.      Benjy Guevara MD  [facility]  10/25/23  21:00 EDT    Dictated portions of note using dragon dictation software.    Electronically signed by Benjy Guevara MD at 10/25/23 2112          Emergency Department Notes        Ari Graves, ERNST at 10/25/23 1453          EMS ETA 0945    Electronically signed by Ari Graves, RN  at 10/25/23 1453       Ari Graves RN at 10/25/23 1443          EMS called for transport. Will call back once patient has bed assignment to get ETA      Electronically signed by Ari Graves RN at 10/25/23 1444       Luli Jaramillo PA-C at 10/25/23 1411       Attestation signed by Enrique Leigh MD at 10/25/23 6825        SUPERVISE: For this patient encounter, I reviewed the APC's documentation, treatment plan, and medical decision making.  Enrique Leigh MD 10/25/2023 17:55 EDT                         Subjective  History of Present Illness  Patient is a healthy 58-year-old that has had vomiting and diarrhea that began 3 days ago.  He was given Zofran on a telehealth visit and that has helped with the vomiting tremendously.  He is having copious watery diarrhea.  He denies any recent travel or antibiotics.  He has never had C. difficile.  He has had normal cramping he would expect from gastroenteritis but says he is not having abnormal abdominal pain.  He feels very dehydrated and is having a lot of muscle aches especially in his legs.  He has had chills and sweats but has not checked for fever.  He says he is lost 12 pounds in these last 4 days.      Review of Systems   Constitutional:  Positive for chills and diaphoresis.   HENT: Negative.     Respiratory: Negative.     Cardiovascular: Negative.    Gastrointestinal:  Positive for diarrhea, nausea and vomiting. Negative for abdominal pain and blood in stool.   Genitourinary:  Positive for decreased urine volume.   Musculoskeletal:  Positive for myalgias.   Neurological:  Positive for weakness.   All other systems reviewed and are negative.      History reviewed. No pertinent past medical history.    No Known Allergies    Past Surgical History:   Procedure Laterality Date    FACIAL FRACTURE SURGERY      SHOULDER SURGERY         Family History   Problem Relation Age of Onset    Arthritis Father        Social History     Socioeconomic History     Marital status:    Tobacco Use    Smoking status: Never    Smokeless tobacco: Never   Substance and Sexual Activity    Alcohol use: Yes           Objective  Physical Exam  Constitutional:       Appearance: Normal appearance. He is normal weight.   HENT:      Head: Normocephalic.      Mouth/Throat:      Mouth: Mucous membranes are dry.   Cardiovascular:      Rate and Rhythm: Tachycardia present.   Pulmonary:      Effort: Pulmonary effort is normal.      Breath sounds: Normal breath sounds.   Abdominal:      General: Bowel sounds are normal.      Tenderness: There is no abdominal tenderness. There is no right CVA tenderness, left CVA tenderness or guarding.   Musculoskeletal:         General: Normal range of motion.      Cervical back: Normal range of motion.   Skin:     General: Skin is warm and dry.   Neurological:      Mental Status: He is alert.   Psychiatric:         Mood and Affect: Mood normal.         Behavior: Behavior normal.         Procedures           ED Course                                           Medical Decision Making  Presentation patient appears shaky and dehydrated.  His heart rate is about 105.  Oxygen 96% on room air.  Blood pressure 130/95.  He is afebrile.  He is in renal failure.  Potassium is 3.4.  He appears hemoconcentrated.  He is not having any unusual abdominal pain and has a soft benign abdominal exam, CT not ordered.    I started him on 2 L normal saline initially and have switched that to LR beginning with the third liter.  He says the Benadryl and Reglan have helped.  He has some oral Zofran and asked if he could take that.  I told him he is welcome to for his own comfort but we also have IV Zofran.    He has no past medical history including no history of renal failure.  He sees Dr. Varghese.    Have spoken to Dr. Benjy Leon over the telephone and he accepts patient.  Patient is stable and is waiting for ambulance for transport.        Problems Addressed:  Acute renal  failure, unspecified acute renal failure type: complicated acute illness or injury  Gastroenteritis: complicated acute illness or injury    Amount and/or Complexity of Data Reviewed  Labs: ordered.    Risk  Prescription drug management.  Decision regarding hospitalization.        Final diagnoses:   Acute renal failure, unspecified acute renal failure type   Gastroenteritis       ED Disposition  ED Disposition       ED Disposition   Decision to Admit    Condition   --    Comment   Level of Care: Telemetry [5]   Diagnosis: ARF (acute renal failure) [298898]   Admitting Physician: JAZMIN DEUTSCH [[id number]]   Attending Physician: JAZMIN DEUTSCH [[id number]]   Isolate for COVID?: No [0]   Certification: I Certify That Inpatient Hospital Services Are Medically Necessary For Greater Than 2 Midnights                 No follow-up provider specified.       Medication List      No changes were made to your prescriptions during this visit.           Electronically signed by Enrique Leigh MD at 10/25/23 9485       Medication Administration Report for SarahmarlenLuisito as of 10/26/23 1405     Legend:    Given Hold Not Given Due Canceled Entry Other Actions    Time Time (Time) Time Time-Action         Discontinued     Completed     Future     MAR Hold     Linked             Medications 10/25/23 10/26/23      acetaminophen (TYLENOL) tablet 650 mg  Dose: 650 mg  Freq: Every 4 Hours PRN Route: PO  PRN Reason: Mild Pain  Start: 10/25/23 1810   Admin Instructions:   If given for fever, use fever parameter: fever greater than 100.4 °F  Based on patient request - if ordered for moderate or severe pain, provider allows for administration of a medication prescribed for a lower pain scale.    Do not exceed 4 grams of acetaminophen in a 24 hr period. Max dose of 2gm for AST/ALT greater than 120 units/L.    If given for pain, use the following pain scale:   Mild Pain = Pain Score of 1-3, CPOT 1-2  Moderate Pain = Pain Score of 4-6,  CPOT 3-4  Severe Pain = Pain Score of 7-10, CPOT 5-8    2043-Given               Or  acetaminophen (TYLENOL) 160 MG/5ML oral solution 650 mg  Dose: 650 mg  Freq: Every 4 Hours PRN Route: PO  PRN Reason: Mild Pain  Start: 10/25/23 1810   Admin Instructions:   If given for fever, use fever parameter: fever greater than 100.4 °F  Based on patient request - if ordered for moderate or severe pain, provider allows for administration of a medication prescribed for a lower pain scale.    Do not exceed 4 grams of acetaminophen in a 24 hr period. Max dose of 2gm for AST/ALT greater than 120 units/L.    If given for pain, use the following pain scale:   Mild Pain = Pain Score of 1-3, CPOT 1-2  Moderate Pain = Pain Score of 4-6, CPOT 3-4  Severe Pain = Pain Score of 7-10, CPOT 5-8    2043-Not Given:  See Alt               Or  acetaminophen (TYLENOL) suppository 650 mg  Dose: 650 mg  Freq: Every 4 Hours PRN Route: RE  PRN Reason: Mild Pain  Start: 10/25/23 1810   Admin Instructions:   If given for fever, use fever parameter: fever greater than 100.4 °F  Based on patient request - if ordered for moderate or severe pain, provider allows for administration of a medication prescribed for a lower pain scale.    Do not exceed 4 grams of acetaminophen in a 24 hr period. Max dose of 2gm for AST/ALT greater than 120 units/L.    If given for pain, use the following pain scale:   Mild Pain = Pain Score of 1-3, CPOT 1-2  Moderate Pain = Pain Score of 4-6, CPOT 3-4  Severe Pain = Pain Score of 7-10, CPOT 5-8    2043-Not Given:  See Alt                HYDROcodone-acetaminophen (NORCO) 5-325 MG per tablet 1 tablet  Dose: 1 tablet  Freq: Every 4 Hours PRN Route: PO  PRN Reason: Moderate Pain  Start: 10/25/23 1810   End: 11/01/23 1809   Admin Instructions:   Based on patient request - if ordered for moderate or severe pain, provider allows for administration of a medication prescribed for a lower pain scale.  [ELIAS]    Do not exceed 4 grams of  acetaminophen in a 24 hr period. Max dose of 2gm for AST/ALT greater than 120 units/L        If given for pain, use the following pain scale:   Mild Pain = Pain Score of 1-3, CPOT 1-2  Moderate Pain = Pain Score of 4-6, CPOT 3-4  Severe Pain = Pain Score of 7-10, CPOT 5-8         lactated ringers infusion  Rate: 100 mL/hr Dose: 100 mL/hr  Freq: Continuous Route: IV  Start: 10/25/23 1900    1850-New Bag            0411-New Bag               nitroglycerin (NITROSTAT) SL tablet 0.4 mg  Dose: 0.4 mg  Freq: Every 5 Minutes PRN Route: SL  PRN Reason: Chest Pain  PRN Comment: Only if SBP Greater Than 100  Start: 10/25/23 1804   Admin Instructions:   If Pain Unrelieved After 3 Doses Notify MD  May administer up to 3 doses per episode.         ondansetron (ZOFRAN) tablet 4 mg  Dose: 4 mg  Freq: Every 6 Hours PRN Route: PO  PRN Reasons: Nausea,Vomiting  Start: 10/25/23 1810   Admin Instructions:   If BOTH ondansetron (ZOFRAN) and promethazine (PHENERGAN) are ordered use ondansetron first and THEN promethazine IF ondansetron is ineffective.        Or  ondansetron (ZOFRAN) injection 4 mg  Dose: 4 mg  Freq: Every 6 Hours PRN Route: IV  PRN Reasons: Nausea,Vomiting  Start: 10/25/23 1810   Admin Instructions:   If BOTH ondansetron (ZOFRAN) and promethazine (PHENERGAN) are ordered use ondansetron first and THEN promethazine IF ondansetron is ineffective.         sodium chloride 0.9 % flush 10 mL  Dose: 10 mL  Freq: As Needed Route: IV  PRN Reason: Line Care  Start: 10/25/23 1804         sodium chloride 0.9 % flush 10 mL  Dose: 10 mL  Freq: Every 12 Hours Scheduled Route: IV  Start: 10/25/23 2100     0002-Given     0852-Given     2100             sodium chloride 0.9 % infusion 40 mL  Dose: 40 mL  Freq: As Needed Route: IV  PRN Reason: Line Care  Start: 10/25/23 1804   Admin Instructions:   Following administration of an IV intermittent medication, flush line with 40mL NS at 100mL/hr.        Completed Medications  Medications  10/25/23 10/26/23       diphenhydrAMINE (BENADRYL) injection 25 mg  Dose: 25 mg  Freq: Once Route: IV  Start: 10/25/23 1345   End: 10/25/23 1341   Admin Instructions:   25 mg may be given IV push over less than 1 minute.  Caution: Look alike/sound alike drug alert. This med may be ordered in other forms and routes. Before giving verify the last time the drug was given by any route/form.    1341-Given                lactated ringers infusion 1,000 mL  Dose: 1,000 mL  Freq: Once Route: IV  Start: 10/25/23 1500   End: 10/25/23 1606   Admin Instructions:   Wide open    1441-New Bag     1606-Stopped               metoclopramide (REGLAN) injection 10 mg  Dose: 10 mg  Freq: Once Route: IV  Start: 10/25/23 1345   End: 10/25/23 1340   Admin Instructions:   Doses of 10 mg or less can be given IV push undiluted over 1 to 2 minutes    1340-Given                potassium chloride (K-DUR,KLOR-CON) ER tablet 40 mEq  Dose: 40 mEq  Freq: Once Route: PO  Start: 10/26/23 0915   End: 10/26/23 0852   Admin Instructions:   Do not crush. Take with food.  Swallow whole; do not crush, split, or chew.     0852-Given               sodium chloride 0.9 % bolus 2,000 mL  Dose: 2,000 mL  Freq: Once Route: IV  Start: 10/25/23 1345   End: 10/25/23 1541    1341-New Bag     1541-Stopped                             Physician Progress Notes (last 48 hours)        Marzena Campbell MD at 10/25/23 1846          Chart reviewed. Jerrod, hyponatremia, hypokalemia and metabolic acidosis. Likely secondary to dehydration. Agree with current ivf. Will f/u with labs in am    Electronically signed by Marzena Campbell MD at 10/25/23 1847          Consult Notes (last 48 hours)        Hao Allen MD at 10/26/23 0808        Consult Orders    1. Inpatient Nephrology Consult [[id number]] ordered by Benjy Guevara MD at 10/25/23 1810                                                                                                                 [facility]  Consultants                                                                                             Nephrology Initial Consult Note    Patient Identification:  Name: Luisito Burton MRN: [id number]  Age: 58 y.o. : 1965  Sex: male  Date:10/26/2023    Requesting Physician: As per consult order.  Reason for Consultation: Acute kidney injury, metabolic acidosis, dehydration  Information from:patient/ family/ chart      History of Present Illness: This is a 58 y.o. year old male with no prior known history of chronic kidney disease or evaluation by nephrology.  His creatinine was 1.1 in January which appears to be his baseline, 0.98 last year.  He is not on any home medications, does have a history of hypertension that is being managed by primary care.  Blood pressures been 1 30-1 40 at his recent office visits.  It appears patient has been reluctant to start antihypertensive medications.  He came to the ER last night complaining of diarrhea for the last 4 days, nonbloody associated with nausea vomiting and severely decreased oral intake, unable to get down liquids due to nausea.  Outpatient Zofran ineffective, also complained of muscle cramping.  He is not on any NSAIDs, no recent IV contrast, BP soft in the ER, 92/74 but improved with fluid boluses.  Initial labs showed a creatinine of 5.2, sodium 128, potassium 3.4 and CO2 15.  He was started on IV fluids overnight with marked improvement in his chemistries.  Creatinine today 2.8, lactate is decreasing.  Initial CBC showed hemoconcentration but that has improved as well with IV fluids.  UA showed some proteinuria but it was a very concentrated specimen and will need to be repeated.    Stool GI PCR was positive for Salmonella    The following medical history and medications personally reviewed by me:    Problem List:     ARF (acute renal failure)    Diarrhea      Past Medical History:  History reviewed. No pertinent past medical history.    Past Surgical  History:  Past Surgical History:   Procedure Laterality Date    FACIAL FRACTURE SURGERY      SHOULDER SURGERY          Home Meds:   Medications Prior to Admission   Medication Sig Dispense Refill Last Dose    ondansetron (ZOFRAN) 8 MG tablet Take 1 tablet by mouth Every 8 (Eight) Hours As Needed for Nausea or Vomiting.   10/25/2023       Current Meds:   Current Facility-Administered Medications   Medication Dose Route Frequency Provider Last Rate Last Admin    acetaminophen (TYLENOL) tablet 650 mg  650 mg Oral Q4H PRN Benjy Guevara MD   650 mg at 10/25/23 2043    Or    acetaminophen (TYLENOL) 160 MG/5ML oral solution 650 mg  650 mg Oral Q4H PRN Benjy Guevara MD        Or    acetaminophen (TYLENOL) suppository 650 mg  650 mg Rectal Q4H PRN Benjy Guevara MD        HYDROcodone-acetaminophen (NORCO) 5-325 MG per tablet 1 tablet  1 tablet Oral Q4H PRN Benjy Guevara MD        lactated ringers infusion  100 mL/hr Intravenous Continuous Benjy Guevara  mL/hr at 10/26/23 0411 100 mL/hr at 10/26/23 0411    nitroglycerin (NITROSTAT) SL tablet 0.4 mg  0.4 mg Sublingual Q5 Min PRN Benjy Guevara MD        ondansetron (ZOFRAN) tablet 4 mg  4 mg Oral Q6H PRN Benjy Guevara MD        Or    ondansetron (ZOFRAN) injection 4 mg  4 mg Intravenous Q6H PRN Benjy Guevara MD        sodium chloride 0.9 % flush 10 mL  10 mL Intravenous Q12H Benjy Guevara MD   10 mL at 10/26/23 0002    sodium chloride 0.9 % flush 10 mL  10 mL Intravenous PRN Benjy Guevara MD        sodium chloride 0.9 % infusion 40 mL  40 mL Intravenous PRN Benjy Guevara MD           Allergies:  No Known Allergies    Social History:   Social History     Socioeconomic History    Marital status:    Tobacco Use    Smoking status: Never    Smokeless tobacco: Never   Vaping Use    Vaping Use: Never used   Substance and Sexual Activity    Alcohol use: Yes    Drug use: Never    Sexual activity: Defer     "    Family History:  Family History   Problem Relation Age of Onset    Arthritis Father         Review of Systems: as per HPI, in addition:    General:      Complains of weakness / fatigue,                       No fevers / chills                       no weight loss  HEENT:       no dysphagia / odynophagia  Neck:           normal range of motion, no swelling  Respiratory: no cough / congestion                      No shortness of air                       No wheezing  CV:              No chest pain                       No palpitations  Abdomen/GI: + Nausea vomiting and abdominal pain :             no dysuria / urinary frequency                       No urgency, normal output  Endocrine:   no polyuria / polydipsia,                      No heat or cold intolerance  Skin:           no rashes or skin breakdown   Vascular:   No edema                     No claudication  Psych:        no depression/ anxiety  Neuro:        no focal weakness, no seizures  Musculoskeletal: no joint pain or deformities      Physical Exam:  Vitals:   Temp (24hrs), Av.8 °F (36.6 °C), Min:97.5 °F (36.4 °C), Max:98.4 °F (36.9 °C)    /75 (BP Location: Right arm, Patient Position: Lying)   Pulse 111   Temp 98.4 °F (36.9 °C) (Oral)   Resp 18   Ht 165.1 cm (65\")   Wt 77.1 kg (170 lb)   SpO2 94%   BMI 28.29 kg/m²   Intake/Output:   No intake or output data in the 24 hours ending 10/26/23 0808     Wt Readings from Last 1 Encounters:   10/25/23 1304 77.1 kg (170 lb)       Exam:    General Appearance:  Awake, alert, oriented x3, no acute distress  Mildly ill-appearing   Head and Face:  Normocephalic, atraumatic, mucus membranes moist, oropharynx clear   Eyes:  No icterus, pupils equal round and reactive to light, extraocular movements intact    ENMT: Moist mucosa, tongue symmetric    Neck: Supple  no jugular venous distention  no thyromegaly   Pulmonary:  Respiratory effort: Normal  Auscultation of lungs: Clear bilaterally  No " wheezes  No rhonchi  Good air movement, good expansion   Chest wall:  No tenderness or deformity   Cardiovascular:  Auscultation of the heart: Normal rhythm, no murmurs  No edema of bilateral lower extremities   Abdomen:  Abdomen: soft, non-tender, normal bowel sounds all four quadrants, no masses   Liver and spleen: no hepatosplenomegaly   Musculoskeletal: Digits and nails: normal  Normal range of motion  No joint swelling or gross deformities    Skin: Skin inspection: color normal, no visible rashes or lesions  Skin palpation: texture, turgor normal, no palpable lesions   Lymphatic:  no cervical lymphadenopathy    Psychiatric: Judgement and insight: normal  Orientation to person place and time: normal  Mood and affect: normal       DATA:  Radiology and Labs:  The following labs independently reviewed by me, additional AM labs ordered  Old records independently reviewed showing normal baseline creatinine around 1.0  The following radiologic studies independently viewed by me, findings chest x-ray showed low lung volumes, atelectasis  Interval notes, chart personally reviewed by me.  I have reviewed and summarized old records as detailed above  Plan of care discussed with patient himself at bedside  New problems include acute renal failure, metabolic acidosis, hypokalemia    Dialysis patient access: Not applicable    Risk/ complexity of medical care/ medical decision making: High complexity, severe renal failure with severe electrolyte abnormalities        Labs:   Recent Results (from the past 24 hour(s))   Comprehensive Metabolic Panel    Collection Time: 10/25/23  1:18 PM    Specimen: Blood   Result Value Ref Range    Glucose 160 (H) 65 - 99 mg/dL    BUN 56 (H) 6 - 20 mg/dL    Creatinine 5.23 (H) 0.76 - 1.27 mg/dL    Sodium 128 (L) 136 - 145 mmol/L    Potassium 3.4 (L) 3.5 - 5.2 mmol/L    Chloride 95 (L) 98 - 107 mmol/L    CO2 15.5 (L) 22.0 - 29.0 mmol/L    Calcium 9.7 8.6 - 10.5 mg/dL    Total Protein 9.0 (H) 6.0  - 8.5 g/dL    Albumin 4.8 3.5 - 5.2 g/dL    ALT (SGPT) 31 1 - 41 U/L    AST (SGOT) 17 1 - 40 U/L    Alkaline Phosphatase 75 39 - 117 U/L    Total Bilirubin 0.8 0.0 - 1.2 mg/dL    Globulin 4.2 gm/dL    A/G Ratio 1.1 g/dL    BUN/Creatinine Ratio 10.7 7.0 - 25.0    Anion Gap 17.5 (H) 5.0 - 15.0 mmol/L    eGFR 12.1 (L) >60.0 mL/min/1.73   Lipase    Collection Time: 10/25/23  1:18 PM    Specimen: Blood   Result Value Ref Range    Lipase 32 13 - 60 U/L   CBC Auto Differential    Collection Time: 10/25/23  1:18 PM    Specimen: Blood   Result Value Ref Range    WBC 8.39 3.40 - 10.80 10*3/mm3    RBC 6.02 (H) 4.14 - 5.80 10*6/mm3    Hemoglobin 18.4 (H) 13.0 - 17.7 g/dL    Hematocrit 53.7 (H) 37.5 - 51.0 %    MCV 89.2 79.0 - 97.0 fL    MCH 30.6 26.6 - 33.0 pg    MCHC 34.3 31.5 - 35.7 g/dL    RDW 12.9 12.3 - 15.4 %    RDW-SD 43.3 37.0 - 54.0 fl    MPV 9.9 6.0 - 12.0 fL    Platelets 360 140 - 450 10*3/mm3    Neutrophil % 75.3 42.7 - 76.0 %    Lymphocyte % 10.3 (L) 19.6 - 45.3 %    Monocyte % 13.9 (H) 5.0 - 12.0 %    Eosinophil % 0.0 (L) 0.3 - 6.2 %    Basophil % 0.1 0.0 - 1.5 %    Immature Grans % 0.4 0.0 - 0.5 %    Neutrophils, Absolute 6.32 1.70 - 7.00 10*3/mm3    Lymphocytes, Absolute 0.86 0.70 - 3.10 10*3/mm3    Monocytes, Absolute 1.17 (H) 0.10 - 0.90 10*3/mm3    Eosinophils, Absolute 0.00 0.00 - 0.40 10*3/mm3    Basophils, Absolute 0.01 0.00 - 0.20 10*3/mm3    Immature Grans, Absolute 0.03 0.00 - 0.05 10*3/mm3   Lactic Acid, Plasma    Collection Time: 10/25/23  2:41 PM    Specimen: Blood   Result Value Ref Range    Lactate 2.7 (C) 0.5 - 2.0 mmol/L   STAT Lactic Acid, Reflex    Collection Time: 10/25/23  6:25 PM    Specimen: Blood   Result Value Ref Range    Lactate 2.0 0.5 - 2.0 mmol/L   Gastrointestinal Panel, PCR - Stool, Per Rectum    Collection Time: 10/25/23  8:07 PM    Specimen: Per Rectum; Stool   Result Value Ref Range    Campylobacter Not Detected Not Detected    Plesiomonas shigelloides Not Detected Not Detected     Salmonella Detected (A) Not Detected    Vibrio Not Detected Not Detected    Vibrio cholerae Not Detected Not Detected    Yersinia enterocolitica Not Detected Not Detected    Enteroaggregative E. coli (EAEC) Not Detected Not Detected    Enteropathogenic E. coli (EPEC) Not Detected Not Detected    Enterotoxigenic E. coli (ETEC) lt/st Not Detected Not Detected    Shiga-like toxin-producing E. coli (STEC) stx1/stx2 Not Detected Not Detected    Shigella/Enteroinvasive E. coli (EIEC) Not Detected Not Detected    Cryptosporidium Not Detected Not Detected    Cyclospora cayetanensis Not Detected Not Detected    Entamoeba histolytica Not Detected Not Detected    Giardia lamblia Not Detected Not Detected    Adenovirus F40/41 Not Detected Not Detected    Astrovirus Not Detected Not Detected    Norovirus GI/GII Not Detected Not Detected    Rotavirus A Not Detected Not Detected    Sapovirus (I, II, IV or V) Not Detected Not Detected   Urinalysis With Microscopic If Indicated (No Culture) - Urine, Clean Catch    Collection Time: 10/26/23  4:30 AM    Specimen: Urine, Clean Catch   Result Value Ref Range    Color, UA Yellow Yellow, Straw    Appearance, UA Clear Clear    pH, UA 5.5 5.0 - 8.0    Specific Gravity, UA 1.025 1.005 - 1.030    Glucose, UA Negative Negative    Ketones, UA Negative Negative    Bilirubin, UA Negative Negative    Blood, UA Moderate (2+) (A) Negative    Protein,  mg/dL (2+) (A) Negative    Leuk Esterase, UA Negative Negative    Nitrite, UA Negative Negative    Urobilinogen, UA 0.2 E.U./dL 0.2 - 1.0 E.U./dL   Sodium, Urine, Random - Urine, Clean Catch    Collection Time: 10/26/23  4:30 AM    Specimen: Urine, Clean Catch   Result Value Ref Range    Sodium, Urine 20 mmol/L   Creatinine Urine Random (kidney function) GFR component - Urine, Clean Catch    Collection Time: 10/26/23  4:30 AM    Specimen: Urine, Clean Catch   Result Value Ref Range    Creatinine, Urine 234.8 mg/dL   Osmolality, Urine - Urine,  Clean Catch    Collection Time: 10/26/23  4:30 AM    Specimen: Urine, Clean Catch   Result Value Ref Range    Osmolality, Urine 736 mOsm/kg   Urinalysis, Microscopic Only - Urine, Clean Catch    Collection Time: 10/26/23  4:30 AM    Specimen: Urine, Clean Catch   Result Value Ref Range    RBC, UA 0-2 None Seen, 0-2 /HPF    WBC, UA None Seen None Seen, 0-2 /HPF    Bacteria, UA Trace (A) None Seen /HPF    Squamous Epithelial Cells, UA 0-2 None Seen, 0-2 /HPF    Hyaline Casts, UA 0-2 None Seen /LPF    Mucus, UA Trace None Seen, Trace /HPF    Methodology Manual Light Microscopy    Comprehensive Metabolic Panel    Collection Time: 10/26/23  5:52 AM    Specimen: Blood   Result Value Ref Range    Glucose 114 (H) 65 - 99 mg/dL    BUN 55 (H) 6 - 20 mg/dL    Creatinine 2.81 (H) 0.76 - 1.27 mg/dL    Sodium 133 (L) 136 - 145 mmol/L    Potassium 3.1 (L) 3.5 - 5.2 mmol/L    Chloride 96 (L) 98 - 107 mmol/L    CO2 21.8 (L) 22.0 - 29.0 mmol/L    Calcium 8.9 8.6 - 10.5 mg/dL    Total Protein 7.8 6.0 - 8.5 g/dL    Albumin 4.3 3.5 - 5.2 g/dL    ALT (SGPT) 28 1 - 41 U/L    AST (SGOT) 18 1 - 40 U/L    Alkaline Phosphatase 58 39 - 117 U/L    Total Bilirubin 0.9 0.0 - 1.2 mg/dL    Globulin 3.5 gm/dL    A/G Ratio 1.2 g/dL    BUN/Creatinine Ratio 19.6 7.0 - 25.0    Anion Gap 15.2 (H) 5.0 - 15.0 mmol/L    eGFR 25.6 (L) >60.0 mL/min/1.73   CBC Auto Differential    Collection Time: 10/26/23  5:52 AM    Specimen: Blood   Result Value Ref Range    WBC 7.34 3.40 - 10.80 10*3/mm3    RBC 5.46 4.14 - 5.80 10*6/mm3    Hemoglobin 17.0 13.0 - 17.7 g/dL    Hematocrit 48.8 37.5 - 51.0 %    MCV 89.4 79.0 - 97.0 fL    MCH 31.1 26.6 - 33.0 pg    MCHC 34.8 31.5 - 35.7 g/dL    RDW 13.3 12.3 - 15.4 %    RDW-SD 43.4 37.0 - 54.0 fl    MPV 10.2 6.0 - 12.0 fL    Platelets 297 140 - 450 10*3/mm3    Neutrophil % 76.7 (H) 42.7 - 76.0 %    Lymphocyte % 7.6 (L) 19.6 - 45.3 %    Monocyte % 15.0 (H) 5.0 - 12.0 %    Eosinophil % 0.0 (L) 0.3 - 6.2 %    Basophil % 0.3 0.0 -  1.5 %    Neutrophils, Absolute 5.63 1.70 - 7.00 10*3/mm3    Lymphocytes, Absolute 0.56 (L) 0.70 - 3.10 10*3/mm3    Monocytes, Absolute 1.10 (H) 0.10 - 0.90 10*3/mm3    Eosinophils, Absolute 0.00 0.00 - 0.40 10*3/mm3    Basophils, Absolute 0.02 0.00 - 0.20 10*3/mm3   Magnesium    Collection Time: 10/26/23  5:52 AM    Specimen: Blood   Result Value Ref Range    Magnesium 1.8 1.6 - 2.6 mg/dL   Phosphorus    Collection Time: 10/26/23  5:52 AM    Specimen: Blood   Result Value Ref Range    Phosphorus 4.4 2.5 - 4.5 mg/dL   Hemoglobin A1c    Collection Time: 10/26/23  5:52 AM    Specimen: Blood   Result Value Ref Range    Hemoglobin A1C 6.00 (H) 4.80 - 5.60 %   TSH Rfx On Abnormal To Free T4    Collection Time: 10/26/23  5:52 AM    Specimen: Blood   Result Value Ref Range    TSH 2.100 0.270 - 4.200 uIU/mL       Radiology:  Imaging Results (Last 24 Hours)       ** No results found for the last 24 hours. **                 ASSESSMENT:   New, severe renal failure.  This all appears prerenal.  Initial chemistries show hemoconcentration and labs significantly improved overnight with IV fluids.  Nausea vomiting and diarrhea  Salmonella infection  Hypokalemia from GI losses  Metabolic acidosis from renal failure and lactic acidosis, improving  Proteinuria, recheck after hydration  History of hypertension, not on meds  Severe dehydration  Hypovolemic hyponatremia, improved with IV fluids      DISCUSSION/PLAN:   Renal function significantly improved since yesterday afternoon  Suspect this is all severe prerenal azotemia related to his GI symptoms and presumed Salmonella infection  Replace potassium orally  Acidosis and hyponatremia are improved with current IV fluids so we will keep on LR  No need for renal imaging at this time but will consider if creatinine fails to improve further  We will recheck proteinuria after hydration  Continue to monitor volume and electrolytes closely including daily magnesium and phosphorus  levels    avoid IV contrast and nephrotoxic medications     I appreciate the consult request  Please send me a secure chat message if any questions regarding patient care.      Hao Allen MD  [facility]  Office phone number: [phone]  Answering service phone number: [phone]      10/26/2023        Dictation via Dragon dictation software      Electronically signed by Hao Allen MD at 10/26/23 0844        Corie West MD   Physician  Hospitalist     Progress Notes     Addendum     Date of Service: 10/26/23 1532  Creation Time: 10/26/23 1532     Expand All Collapse All         Name: Luisito Burton ADMIT: 10/25/2023   : 1965  PCP: Provider, No Known    MRN: [id number] LOS: 1 days   AGE/SEX: 58 y.o. male  ROOM: [facility]      Subjective      Subjective   Patient continues with watery diarrhea without fresh bright blood per rectum or melena.  Positive abdominal colic.  No nausea or vomiting.  Improving appetite and tolerating some p.o.  No fever or chills.     Review of Systems  .  Decreased urine output.  No dysuria or hematuria.  Cardio vascular/respiratory.  No chest pain/no shortness of breath/positive occasional palpitations/no cough  CNS.  No dizziness.  No loss of consciousness.  No focal neurological symptoms.        Objective      Objective   Vital Signs  Temp:  [97.6 °F (36.4 °C)-98.4 °F (36.9 °C)] 98.1 °F (36.7 °C)  Heart Rate:  [] 107  Resp:  [16-18] 18  BP: ()/(73-86) 117/79  SpO2:  [94 %-99 %] 94 %  on   ;   Device (Oxygen Therapy): room air     Intake/Output Summary (Last 24 hours) at 10/26/2023 1533  Last data filed at 10/26/2023 1307      Gross per 24 hour   Intake 480 ml   Output 300 ml   Net 180 ml      Body mass index is 28.29 kg/m².  Vitals             10/25/23  1304   Weight: 77.1 kg (170 lb)         Physical Exam  General.  Middle-aged gentleman.  Alert and oriented x3.  No apparent pain/distress/diaphoresis.  Normal mood and  "affect.  Eyes.  Pupils equal round and reactive.  Intact extraocular musculature.  No pallor or jaundice.  Oral cavity.  Mildly dry mucous membrane.  Neck.  Supple.  No JVD.  No lymphadenopathy or thyromegaly.  Cardiovascular.  Regular rate and rhythm.  Mild tachycardia.  No murmurs.  Chest.  Clear to auscultation bilaterally with no added sounds.    Abdomen.  Soft lax.  Mildly distended.  Normal bowel sounds.  No tenderness.  No guarding or rebound.  Extremities.  No clubbing/cyanosis/edema.    CNS.  No acute focal neurological deficits.           Results Review:             Results from last 7 days   Lab Units 10/26/23  0552 10/25/23  1318   SODIUM mmol/L 133* 128*   POTASSIUM mmol/L 3.1* 3.4*   CHLORIDE mmol/L 96* 95*   CO2 mmol/L 21.8* 15.5*   BUN mg/dL 55* 56*   CREATININE mg/dL 2.81* 5.23*   GLUCOSE mg/dL 114* 160*   CALCIUM mg/dL 8.9 9.7   AST (SGOT) U/L 18 17   ALT (SGPT) U/L 28 31      Estimated Creatinine Clearance: 28.1 mL/min (A) (by C-G formula based on SCr of 2.81 mg/dL (H)).       Results from last 7 days   Lab Units 10/26/23  0552   HEMOGLOBIN A1C % 6.00*               Results from last 7 days   Lab Units 10/26/23  0552   CK TOTAL U/L 163               Results from last 7 days   Lab Units 10/26/23  0552   TSH uIU/mL 2.100           Results from last 7 days   Lab Units 10/26/23  0552   MAGNESIUM mg/dL 1.8   PHOSPHORUS mg/dL 4.4             Invalid input(s): \"LDLCALC\"        Results from last 7 days   Lab Units 10/26/23  0552 10/25/23  1318   WBC 10*3/mm3 7.34 8.39   HEMOGLOBIN g/dL 17.0 18.4*   HEMATOCRIT % 48.8 53.7*   PLATELETS 10*3/mm3 297 360   MCV fL 89.4 89.2   MCH pg 31.1 30.6   MCHC g/dL 34.8 34.3   RDW % 13.3 12.9   RDW-SD fl 43.4 43.3   MPV fL 10.2 9.9   NEUTROPHIL % % 76.7* 75.3   LYMPHOCYTE % % 7.6* 10.3*   MONOCYTES % % 15.0* 13.9*   EOSINOPHIL % % 0.0* 0.0*   BASOPHIL % % 0.3 0.1   IMM GRAN % %  --  0.4   NEUTROS ABS 10*3/mm3 5.63 6.32   LYMPHS ABS 10*3/mm3 0.56* 0.86   MONOS ABS " 10*3/mm3 1.10* 1.17*   EOS ABS 10*3/mm3 0.00 0.00   BASOS ABS 10*3/mm3 0.02 0.01   IMMATURE GRANS (ABS) 10*3/mm3  --  0.03                    Results from last 7 days   Lab Units 10/25/23  1825 10/25/23  1441   LACTATE mmol/L 2.0 2.7*               Results from last 7 days   Lab Units 10/25/23  1318   LIPASE U/L 32               Results from last 7 days   Lab Units 10/25/23  2007   ADENOVIRUS   Not Detected           Results from last 7 days   Lab Units 10/26/23  0430   NITRITE UA   Negative   WBC UA /HPF None Seen   BACTERIA UA /HPF Trace*   SQUAM EPITHEL UA /HPF 0-2           Results from last 7 days   Lab Units 10/26/23  0430   SODIUM UR mmol/L 20   CREATININE UR mg/dL 234.8   CHLORIDE UR mmol/L <20   OSMOLALITY UR mOsm/kg 736   PROTEIN TOTAL URINE mg/dL 78.5         Imaging:  Imaging Results (Last 24 Hours)         ** No results found for the last 24 hours. **                   I reviewed the patient's new clinical results / labs / tests / procedures        Assessment/Plan            Active Hospital Problems     Diagnosis   POA    **ARF (acute renal failure) [N17.9]   Yes    Nausea and vomiting [R11.2]   Yes    E coli enteritis [A04.4]   Yes    Dehydration [E86.0]   Yes    Diarrhea [R19.7]   Yes       Resolved Hospital Problems   No resolved problems to display.               Salmonella enteritis leading to nausea and vomiting and diarrhea.  Benign GI examination.  Low suspicion for C. difficile.  Resolved nausea and vomiting.  Continues with diarrhea.  Will continue IV fluids/IV Zofran.  Will initiate cholestyramine.  No need for antibiotic.  Check blood cultures.  Normal liver function test.  Acute kidney failure/hypokalemia/metabolic acidosis mostly secondary to prerenal azotemia leading to hypovolemia and subsequent acute tubular necrosis.  Improving on IV fluid.  Good urine output.  Appreciate nephrology feedback.  We will continue to monitor.  Hypokalemia being substituted.  Magnesium is normal.  Acidosis  is improving and is most likely secondary to GI bicarbonate loss.  Prediabetes.  Diet at discharge.  VTE prophylaxis.  Subcu heparin     Discussed my findings and plan of treatment with the patient/family/nurses at multidisciplinary rounds.  Disposition.  To be determined based on clinical course.  Probably home in 2 days if continues to improve.           Corie West MD  [facility]  10/26/23  15:33 EDT                       Revision History

## 2023-10-26 NOTE — PLAN OF CARE
Goal Outcome Evaluation:  Plan of Care Reviewed With: patient        Progress: no change  Outcome Evaluation: Sinus tach on the monitor, otherwise VSS.  Pt complained of a headache, treated with tylenol.  Stool sample sent to lab, positive for Salmonella.  Continue IV fluids.  Pt is on a GI soft lactose restricted diet.  Alert and oriented x4, room air.  Pt is up ad alicia.

## 2023-10-26 NOTE — SIGNIFICANT NOTE
10/26/23 1017   OTHER   Discipline physical therapist   Rehab Time/Intention   Session Not Performed other (see comments)  (PT eval order received, pt up ad alicia in room, no mobility concerns noted. spoke with ERNST Elias)

## 2023-10-26 NOTE — H&P
Patient Name:  Luisito Burton  YOB: 1965  MRN:  [id number]  Admit Date:  10/25/2023  Patient Care Team:  Provider, No Known as PCP - General      Subjective   History Present Illness     Chief Complaint   Patient presents with    Nausea    Vomiting    Diarrhea       Mr. Burton is a 58 y.o. who presents to [facility] complaining of diarrhea for about 4 days. He reports frequent nonbloody diarrhea starting Sunday. He then developed nausea and vomiting and was not able to keep food or liquids down due to the nausea. He received zofran as outpatient and this helped the nausea but then he began to have muscle cramping. The cramping was whole body but mostly in his legs and feet. He presented to ER and was found to have MILAGRO and electrolyte abnormalities. He also had metabolic acidosis. He is not having abdominal pain currently. Not having fever. No dysuria or flank pain reported. He reports that he does have occasional issue with dairy but he has not had any recently.    Review of Systems   Constitutional:  Negative for chills and fever.   HENT:  Negative for trouble swallowing.    Eyes:  Negative for pain and visual disturbance.   Respiratory:  Negative for cough and shortness of breath.    Cardiovascular:  Negative for chest pain and palpitations.   Gastrointestinal:  Positive for diarrhea, nausea and vomiting. Negative for constipation.   Endocrine: Negative for cold intolerance and heat intolerance.   Genitourinary:  Negative for dysuria and flank pain.   Musculoskeletal:  Negative for neck pain and neck stiffness.   Skin:  Negative for pallor and rash.   Allergic/Immunologic: Negative for environmental allergies.   Neurological:  Negative for seizures and syncope.   Hematological:  Negative for adenopathy. Does not bruise/bleed easily.   Psychiatric/Behavioral:  Negative for agitation and confusion.         Personal History     History reviewed. No pertinent past medical  history.  Past Surgical History:   Procedure Laterality Date    FACIAL FRACTURE SURGERY      SHOULDER SURGERY       Family History   Problem Relation Age of Onset    Arthritis Father      Social History     Tobacco Use    Smoking status: Never    Smokeless tobacco: Never   Vaping Use    Vaping Use: Never used   Substance Use Topics    Alcohol use: Yes    Drug use: Never     No current facility-administered medications on file prior to encounter.     Current Outpatient Medications on File Prior to Encounter   Medication Sig Dispense Refill    ondansetron (ZOFRAN) 8 MG tablet Take 1 tablet by mouth Every 8 (Eight) Hours As Needed for Nausea or Vomiting.       No Known Allergies    Objective    Objective     Vital Signs  Temp:  [97.5 °F (36.4 °C)-97.8 °F (36.6 °C)] 97.8 °F (36.6 °C)  Heart Rate:  [] 96  Resp:  [16-18] 16  BP: (110-130)/(73-95) 116/73  SpO2:  [92 %-99 %] 99 %  on   ;      Body mass index is 28.29 kg/m².    Physical Exam  Vitals and nursing note reviewed.   Constitutional:       General: He is not in acute distress.     Appearance: He is not diaphoretic.   HENT:      Head: Atraumatic.   Eyes:      General: No scleral icterus.     Pupils: Pupils are equal, round, and reactive to light.   Cardiovascular:      Rate and Rhythm: Normal rate and regular rhythm.      Pulses: Normal pulses.   Pulmonary:      Effort: Pulmonary effort is normal.      Breath sounds: No wheezing.   Abdominal:      General: There is no distension.      Palpations: Abdomen is soft.      Tenderness: There is no abdominal tenderness. There is no guarding or rebound.   Musculoskeletal:         General: No swelling or tenderness.      Cervical back: Neck supple.   Skin:     General: Skin is warm and dry.   Neurological:      Mental Status: He is alert.      Cranial Nerves: No cranial nerve deficit.   Psychiatric:         Mood and Affect: Mood normal.         Behavior: Behavior normal.         Results Review:  I reviewed the  patient's new clinical results.  I reviewed prior records.    Lab Results (last 24 hours)       Procedure Component Value Units Date/Time    CBC & Differential [[id number]]  (Abnormal) Collected: 10/25/23 1318    Specimen: Blood Updated: 10/25/23 1335    Narrative:      The following orders were created for panel order CBC & Differential.  Procedure                               Abnormality         Status                     ---------                               -----------         ------                     CBC Auto Differential[[id number]]        Abnormal            Final result                 Please view results for these tests on the individual orders.    Comprehensive Metabolic Panel [[id number]]  (Abnormal) Collected: 10/25/23 1318    Specimen: Blood Updated: 10/25/23 1343     Glucose 160 mg/dL      BUN 56 mg/dL      Creatinine 5.23 mg/dL      Sodium 128 mmol/L      Potassium 3.4 mmol/L      Chloride 95 mmol/L      CO2 15.5 mmol/L      Calcium 9.7 mg/dL      Total Protein 9.0 g/dL      Albumin 4.8 g/dL      ALT (SGPT) 31 U/L      AST (SGOT) 17 U/L      Alkaline Phosphatase 75 U/L      Total Bilirubin 0.8 mg/dL      Globulin 4.2 gm/dL      A/G Ratio 1.1 g/dL      BUN/Creatinine Ratio 10.7     Anion Gap 17.5 mmol/L      eGFR 12.1 mL/min/1.73      Comment: <15 Indicative of kidney failure       Narrative:      GFR Normal >60  Chronic Kidney Disease <60  Kidney Failure <15      Lipase [[id number]]  (Normal) Collected: 10/25/23 1318    Specimen: Blood Updated: 10/25/23 1343     Lipase 32 U/L     CBC Auto Differential [[id number]]  (Abnormal) Collected: 10/25/23 1318    Specimen: Blood Updated: 10/25/23 1335     WBC 8.39 10*3/mm3      RBC 6.02 10*6/mm3      Hemoglobin 18.4 g/dL      Hematocrit 53.7 %      MCV 89.2 fL      MCH 30.6 pg      MCHC 34.3 g/dL      RDW 12.9 %      RDW-SD 43.3 fl      MPV 9.9 fL      Platelets 360 10*3/mm3      Neutrophil % 75.3 %      Lymphocyte % 10.3 %      Monocyte % 13.9 %       Eosinophil % 0.0 %      Basophil % 0.1 %      Immature Grans % 0.4 %      Neutrophils, Absolute 6.32 10*3/mm3      Lymphocytes, Absolute 0.86 10*3/mm3      Monocytes, Absolute 1.17 10*3/mm3      Eosinophils, Absolute 0.00 10*3/mm3      Basophils, Absolute 0.01 10*3/mm3      Immature Grans, Absolute 0.03 10*3/mm3     Lactic Acid, Plasma [[id number]]  (Abnormal) Collected: 10/25/23 1441    Specimen: Blood Updated: 10/25/23 1506     Lactate 2.7 mmol/L     STAT Lactic Acid, Reflex [[id number]]  (Normal) Collected: 10/25/23 1825    Specimen: Blood Updated: 10/25/23 1915     Lactate 2.0 mmol/L     Gastrointestinal Panel, PCR - Stool, Per Rectum [[id number]] Collected: 10/25/23 2007    Specimen: Stool from Per Rectum Updated: 10/25/23 2055            Imaging Results (Last 24 Hours)       ** No results found for the last 24 hours. **                SCANNED - TELEMETRY     Final Result           Assessment/Plan     Active Hospital Problems    Diagnosis  POA    **ARF (acute renal failure) [N17.9]  Yes    Diarrhea [R19.7]  Yes      Resolved Hospital Problems   No resolved problems to display.       Mr. Burton is a 58 y.o.     Diarrhea: Gastroenteritis likely.  GI PCR is pending. No abdominal pain currently. His lactic acidosis has improved. Continue supportive care. Additional workup depending on symptom progression and pcr results. Lactose controlled diet trial.  MILAGRO: IVF infusion. Have ordered urine studies. Will consult nephrology.  Prerenal etiology based on history.  Hypokalemia/Hyponatremia/Metabolic Acidosis: Monitoring.  Hyperglycemia: Random is less than 200. Will monitor and check A1c.  PPx: SCD  I discussed the patient's findings and my recommendations with patient, family, and ED provider.      Benjy Guevara MD  [facility]  10/25/23  21:00 EDT    Dictated portions of note using dragon dictation software.

## 2023-10-26 NOTE — PROGRESS NOTES
"Nutrition Services    Patient Name:  Luisito Burton  YOB: 1965  MRN: [id number]  Admit Date:  10/25/2023    Assessment Date:  10/26/23    Summary: MST 2:  Pt seen for MST 2 for weight loss and KIERA. Pt here with N/V/D and dx'd with Salmonella, gastroenteritis, ARF. Pt stated 15 lb (8%) wt loss x 1 week d/t severe N/V/D. Pt stated nausea has improved with zofran but diarrhea continues. Pt without any signs of fat/muscle wasting on NFPE. Pt tolerating % po intake of lactose-controlled diet today but states everything just goes right through him. Pt agreed to try Boost Breeze BID for additional protein/kcals. Pt continues on IVF and IV zofran and starting on cholestyramine.   Recommend:  Boost Breeze BID, will order.  Will follow per protocol.    CLINICAL NUTRITION ASSESSMENT      Reason for Assessment MST score 2+     Diagnosis/Problem   CC: N/V/D  Dx: Salmonella, gastroenteritis, ARF, dehydration, diarrhea, N/V   Medical/Surgical History History reviewed. No pertinent past medical history.    Past Surgical History:   Procedure Laterality Date    FACIAL FRACTURE SURGERY      SHOULDER SURGERY          Anthropometrics        Current Height  Current Weight  BMI kg/m2 Height: 165.1 cm (65\")  Weight: 77.1 kg (170 lb) (10/25/23 1304)  Body mass index is 28.29 kg/m².   Adjusted BMI (if applicable)    BMI Category Overweight (25 - 29.9)   Ideal Body Weight (IBW) 135 lb   Usual Body Weight (UBW) 186 lb   Weight Trend Loss, Amount/Timeframe: 15 lb (8%) wt loss x 1 week   Weight History Wt Readings from Last 30 Encounters:   10/25/23 1304 77.1 kg (170 lb)   03/01/20 1426 81.6 kg (180 lb)   01/08/18 1540 77.1 kg (170 lb)   08/13/16 1417 73.9 kg (163 lb)      --  Labs       Pertinent Labs    Results from last 7 days   Lab Units 10/26/23  1506 10/26/23  0552 10/25/23  1318   SODIUM mmol/L  --  133* 128*   POTASSIUM mmol/L 3.2* 3.1* 3.4*   CHLORIDE mmol/L  --  96* 95*   CO2 mmol/L  --  21.8* 15.5*   BUN mg/dL  " "--  55* 56*   CREATININE mg/dL  --  2.81* 5.23*   CALCIUM mg/dL  --  8.9 9.7   BILIRUBIN mg/dL  --  0.9 0.8   ALK PHOS U/L  --  58 75   ALT (SGPT) U/L  --  28 31   AST (SGOT) U/L  --  18 17   GLUCOSE mg/dL  --  114* 160*     Results from last 7 days   Lab Units 10/26/23  0552   MAGNESIUM mg/dL 1.8   PHOSPHORUS mg/dL 4.4   HEMOGLOBIN g/dL 17.0   HEMATOCRIT % 48.8   WBC 10*3/mm3 7.34   ALBUMIN g/dL 4.3     Results from last 7 days   Lab Units 10/26/23  0552 10/25/23  1318   PLATELETS 10*3/mm3 297 360     SARS-CoV-2, TAVARES   Date Value Ref Range Status   12/07/2022 DETECTED (A) NOT DETECTED Final     Comment:       A Detected result is considered a positive test result  for COVID-19.  This indicates that RNA from SARS-CoV-2  (formerly 2019-nCoV) was detected, and the patient is  infected with the virus and presumed to be contagious.  If requested by public health authority, specimen will  be sent for additional testing.    Please review the \"Fact Sheets\" and FDA authorized  labeling available for health care providers and  patients using the following websites:  [email or web address]/home/Covid-19/HCP/[employer]/flu-fact-sheet.[email or web address]  [employer]/home/Covid-19/Patients/[employer]/flu-fact-  sheet.html      Please note: If a not detected (negative) Influenza A or  Influenza B is obtained, this means viral RNA was not  present in the specimen above the limit of detection. A  negative result does not rule out the possibility of  influenza and should not be used as the sole basis for  treatment or patient management decisions.  If Influenza  is still suspected, based on exposure history together  with other clinical findings, re-testing should be  considered.    This test has been authorized by the FDA under  an Emergency Use Authorization (EUA) for use by authorized  laboratories.      Due to the current public health emergency, [employer] is receiving a high volume of samples from  a wide variety of swabs and " media for COVID-19 testing.  In order to serve patients during this public health  crisis, samples from appropriate clinical sources are   being tested. Negative test results derived from  specimens received in non-commercially manufactured  viral collection and transport media, or in media and  sample collection kits not yet authorized by FDA for  COVID-19 testing should be cautiously evaluated and the  patient potentially subjected to extra precautions such  as additional clinical monitoring, including collection  of an additional specimen.    Methodology:  Nucleic Acid Amplification Test (NAAT)  includes RT-PCR or TMA      Additional information about COVID-19 can be found  at the [employer] website:  [email or web address].    For patients with a Detected or Inconclusive test  result, please see CDC's COVID-19 Treatments and   Medications page located at   https://www.cdc.gov/coronavirus/2019-ncov/your-health/treatments-for-  severe-illness.html for information on COVID-19 therapeutics.    For patients with a Not Detected test result, please see CDC's  Vaccines for COVID-19 page located at  https://www.cdc.gov/coronavirus/2019-ncov/vaccines/index.html  for information on COVID-19 vaccines.     Lab Results   Component Value Date    HGBA1C 6.00 (H) 10/26/2023          Medications           Scheduled Medications cholestyramine, 1 packet, Oral, Q8H  heparin (porcine), 5,000 Units, Subcutaneous, Q8H  potassium chloride, 40 mEq, Oral, Once  sodium chloride, 10 mL, Intravenous, Q12H       Infusions lactated ringers, 100 mL/hr, Last Rate: 100 mL/hr (10/26/23 1430)       PRN Medications   acetaminophen **OR** acetaminophen **OR** acetaminophen    HYDROcodone-acetaminophen    nitroglycerin    ondansetron **OR** ondansetron    sodium chloride    sodium chloride     Physical Findings          General Findings alert, oriented, overweight   Oral/Mouth Cavity WDL   Edema  no edema   Gastrointestinal diarrhea,  nausea, vomiting, last bowel movement: 10/26   Skin  skin intact   Tubes/Drains/Lines none   NFPE No clinical signs of muscle wasting or fat loss   --  Current Nutrition Orders & Evaluation of Intake       Oral Nutrition     Food Allergies NKFA   Current PO Diet Diet: Gastrointestinal Diets; Lactose-Controlled; Texture: Regular Texture (IDDSI 7); Fluid Consistency: Thin (IDDSI 0)   Supplement n/a   PO Evaluation     % PO Intake %     Factors Affecting Intake: diarrhea, nausea   --  PES STATEMENT / NUTRITION DIAGNOSIS      Nutrition Dx Problem  Problem: Unintentional Weight Loss  Etiology: Factors Affecting Nutrition - N/V/D    Signs/Symptoms: Report of Minimal PO Intake and Unintended Weight Change     NUTRITION INTERVENTION / PLAN OF CARE      Intervention Goal(s) Maintain nutrition status, Improved nutrition related labs, Reduce/improve symptoms, Meet estimated needs, Disease management/therapy, Tolerate PO , Maintain intake, and Accepts oral nutrition supplement         RD Intervention/Action Encourage intake, Continue to monitor, Care plan reviewed, and Recommend/order: ONS   --      Prescription/Orders:       PO Diet       Supplements Boost Breeze BID      Enteral Nutrition       Parenteral Nutrition    New Prescription Ordered? Yes   --      Monitor/Evaluation Per protocol   Discharge Plan/Needs Pending clinical course   --    RD to follow per protocol.      Electronically signed by:  Alison Ramsey RD  10/26/23 17:17 EDT

## 2023-10-26 NOTE — PROGRESS NOTES
Discharge Planning Assessment  [facility]     Patient Name: Luisito Burton  MRN: [id number]  Today's Date: 10/26/2023    Admit Date: 10/25/2023    Plan: Home with family   Discharge Needs Assessment       Row Name 10/26/23 1625       Living Environment    People in Home spouse    Name(s) of People in Home [place]    Current Living Arrangements home    Potentially Unsafe Housing Conditions none    Primary Care Provided by self    Provides Primary Care For no one    Family Caregiver if Needed spouse    Quality of Family Relationships helpful;involved;supportive    Able to Return to Prior Arrangements yes       Resource/Environmental Concerns    Resource/Environmental Concerns none       Transition Planning    Patient/Family Anticipates Transition to home with family    Patient/Family Anticipated Services at Transition none    Transportation Anticipated family or friend will provide       Discharge Needs Assessment    Readmission Within the Last 30 Days no previous admission in last 30 days    Equipment Currently Used at Home none    Concerns to be Addressed no discharge needs identified;denies needs/concerns at this time    Anticipated Changes Related to Illness none    Equipment Needed After Discharge none    Provided Post Acute Provider List? N/A    Provided Post Acute Provider Quality & Resource List? N/A                   Discharge Plan       Row Name 10/26/23 1626       Plan    Plan Home with family    Patient/Family in Agreement with Plan yes    Plan Comments Met with patient and family at the bedside, verified faceshett. Patient lives with spouse. He is IADL, no DME. PCP is Sabino Sidhu, preferred pharmacy is [facility]. Plan his home with family. Patient denies dc needs at this time.                  Continued Care and Services - Admitted Since 10/25/2023    Coordination has not been started for this encounter.          Demographic Summary    No documentation.                  Functional  Status       Row Name 10/26/23 1626       Functional Status    Usual Activity Tolerance good       Assessment of Health Literacy    Health Literacy Good       Functional Status, IADL    Medications independent    Meal Preparation independent    Housekeeping independent    Laundry independent    Shopping independent       Mental Status    General Appearance WDL WDL       Mental Status Summary    Recent Changes in Mental Status/Cognitive Functioning no changes                   Psychosocial    No documentation.                  Abuse/Neglect    No documentation.                  Legal    No documentation.                  Substance Abuse    No documentation.                  Patient Forms    No documentation.                     Delores Greene RN

## 2023-10-27 PROBLEM — R78.81 GRAM-NEGATIVE BACTEREMIA: Status: ACTIVE | Noted: 2023-10-27

## 2023-10-27 LAB
ALBUMIN SERPL-MCNC: 3.5 G/DL (ref 3.5–5.2)
ANION GAP SERPL CALCULATED.3IONS-SCNC: 11 MMOL/L (ref 5–15)
BACTERIA BLD CULT: ABNORMAL
BASOPHILS # BLD AUTO: 0.04 10*3/MM3 (ref 0–0.2)
BASOPHILS NFR BLD AUTO: 0.5 % (ref 0–1.5)
BOTTLE TYPE: ABNORMAL
BUN SERPL-MCNC: 30 MG/DL (ref 6–20)
BUN/CREAT SERPL: 26.1 (ref 7–25)
CALCIUM SPEC-SCNC: 9.2 MG/DL (ref 8.6–10.5)
CHLORIDE SERPL-SCNC: 98 MMOL/L (ref 98–107)
CO2 SERPL-SCNC: 23 MMOL/L (ref 22–29)
CREAT SERPL-MCNC: 1.15 MG/DL (ref 0.76–1.27)
D-LACTATE SERPL-SCNC: 1.5 MMOL/L (ref 0.5–2)
DEPRECATED RDW RBC AUTO: 42.9 FL (ref 37–54)
EGFRCR SERPLBLD CKD-EPI 2021: 74.7 ML/MIN/1.73
EOSINOPHIL # BLD AUTO: 0.02 10*3/MM3 (ref 0–0.4)
EOSINOPHIL NFR BLD AUTO: 0.3 % (ref 0.3–6.2)
ERYTHROCYTE [DISTWIDTH] IN BLOOD BY AUTOMATED COUNT: 13 % (ref 12.3–15.4)
GLUCOSE SERPL-MCNC: 109 MG/DL (ref 65–99)
HCT VFR BLD AUTO: 45.8 % (ref 37.5–51)
HGB BLD-MCNC: 15.7 G/DL (ref 13–17.7)
IMM GRANULOCYTES # BLD AUTO: 0.04 10*3/MM3 (ref 0–0.05)
IMM GRANULOCYTES NFR BLD AUTO: 0.5 % (ref 0–0.5)
LYMPHOCYTES # BLD AUTO: 0.75 10*3/MM3 (ref 0.7–3.1)
LYMPHOCYTES NFR BLD AUTO: 10.1 % (ref 19.6–45.3)
MAGNESIUM SERPL-MCNC: 2 MG/DL (ref 1.6–2.6)
MCH RBC QN AUTO: 30.9 PG (ref 26.6–33)
MCHC RBC AUTO-ENTMCNC: 34.3 G/DL (ref 31.5–35.7)
MCV RBC AUTO: 90.2 FL (ref 79–97)
MONOCYTES # BLD AUTO: 1.06 10*3/MM3 (ref 0.1–0.9)
MONOCYTES NFR BLD AUTO: 14.2 % (ref 5–12)
NEUTROPHILS NFR BLD AUTO: 5.54 10*3/MM3 (ref 1.7–7)
NEUTROPHILS NFR BLD AUTO: 74.4 % (ref 42.7–76)
NRBC BLD AUTO-RTO: 0 /100 WBC (ref 0–0.2)
PHOSPHATE SERPL-MCNC: 1.6 MG/DL (ref 2.5–4.5)
PLATELET # BLD AUTO: 226 10*3/MM3 (ref 140–450)
PMV BLD AUTO: 10.5 FL (ref 6–12)
POTASSIUM SERPL-SCNC: 3.5 MMOL/L (ref 3.5–5.2)
RBC # BLD AUTO: 5.08 10*6/MM3 (ref 4.14–5.8)
SODIUM SERPL-SCNC: 132 MMOL/L (ref 136–145)
URATE SERPL-MCNC: 10.3 MG/DL (ref 3.4–7)
WBC NRBC COR # BLD: 7.45 10*3/MM3 (ref 3.4–10.8)

## 2023-10-27 PROCEDURE — 25010000002 HEPARIN (PORCINE) PER 1000 UNITS: Performed by: INTERNAL MEDICINE

## 2023-10-27 PROCEDURE — 25010000002 CEFTRIAXONE PER 250 MG: Performed by: INTERNAL MEDICINE

## 2023-10-27 PROCEDURE — 84550 ASSAY OF BLOOD/URIC ACID: CPT | Performed by: INTERNAL MEDICINE

## 2023-10-27 PROCEDURE — 83605 ASSAY OF LACTIC ACID: CPT | Performed by: INTERNAL MEDICINE

## 2023-10-27 PROCEDURE — 83735 ASSAY OF MAGNESIUM: CPT | Performed by: INTERNAL MEDICINE

## 2023-10-27 PROCEDURE — 80069 RENAL FUNCTION PANEL: CPT | Performed by: INTERNAL MEDICINE

## 2023-10-27 PROCEDURE — 25810000003 LACTATED RINGERS PER 1000 ML: Performed by: INTERNAL MEDICINE

## 2023-10-27 PROCEDURE — 85025 COMPLETE CBC W/AUTO DIFF WBC: CPT | Performed by: INTERNAL MEDICINE

## 2023-10-27 RX ORDER — POTASSIUM CHLORIDE 750 MG/1
40 TABLET, FILM COATED, EXTENDED RELEASE ORAL ONCE
Status: COMPLETED | OUTPATIENT
Start: 2023-10-27 | End: 2023-10-27

## 2023-10-27 RX ADMIN — SODIUM CHLORIDE, POTASSIUM CHLORIDE, SODIUM LACTATE AND CALCIUM CHLORIDE 100 ML/HR: 600; 310; 30; 20 INJECTION, SOLUTION INTRAVENOUS at 20:16

## 2023-10-27 RX ADMIN — CHOLESTYRAMINE 1 PACKET: 4 POWDER, FOR SUSPENSION ORAL at 03:26

## 2023-10-27 RX ADMIN — CEFTRIAXONE 2000 MG: 2 INJECTION, POWDER, FOR SOLUTION INTRAMUSCULAR; INTRAVENOUS at 14:31

## 2023-10-27 RX ADMIN — DIBASIC SODIUM PHOSPHATE, MONOBASIC POTASSIUM PHOSPHATE AND MONOBASIC SODIUM PHOSPHATE 2 TABLET: 852; 155; 130 TABLET ORAL at 17:31

## 2023-10-27 RX ADMIN — Medication 10 ML: at 08:23

## 2023-10-27 RX ADMIN — CHOLESTYRAMINE 1 PACKET: 4 POWDER, FOR SUSPENSION ORAL at 18:37

## 2023-10-27 RX ADMIN — HEPARIN SODIUM 5000 UNITS: 5000 INJECTION INTRAVENOUS; SUBCUTANEOUS at 14:54

## 2023-10-27 RX ADMIN — SODIUM CHLORIDE, POTASSIUM CHLORIDE, SODIUM LACTATE AND CALCIUM CHLORIDE 100 ML/HR: 600; 310; 30; 20 INJECTION, SOLUTION INTRAVENOUS at 09:41

## 2023-10-27 RX ADMIN — DIBASIC SODIUM PHOSPHATE, MONOBASIC POTASSIUM PHOSPHATE AND MONOBASIC SODIUM PHOSPHATE 2 TABLET: 852; 155; 130 TABLET ORAL at 20:16

## 2023-10-27 RX ADMIN — Medication 10 ML: at 20:16

## 2023-10-27 RX ADMIN — HEPARIN SODIUM 5000 UNITS: 5000 INJECTION INTRAVENOUS; SUBCUTANEOUS at 06:17

## 2023-10-27 RX ADMIN — POTASSIUM CHLORIDE 40 MEQ: 750 TABLET, EXTENDED RELEASE ORAL at 08:23

## 2023-10-27 RX ADMIN — CHOLESTYRAMINE 1 PACKET: 4 POWDER, FOR SUSPENSION ORAL at 11:30

## 2023-10-27 RX ADMIN — HEPARIN SODIUM 5000 UNITS: 5000 INJECTION INTRAVENOUS; SUBCUTANEOUS at 21:57

## 2023-10-27 RX ADMIN — DIBASIC SODIUM PHOSPHATE, MONOBASIC POTASSIUM PHOSPHATE AND MONOBASIC SODIUM PHOSPHATE 2 TABLET: 852; 155; 130 TABLET ORAL at 13:02

## 2023-10-27 NOTE — PROGRESS NOTES
1 of 2 blood cultures are growing gram negative bacillus.  This could be Salmonella septicemia and I will initiate IV Rocephin and consult infectious disease.  Repeat blood cultures tomorrow  Discussed with the patient    10/27/2023 at 2:43 PM.  Second blood culture is also growing gram-negative bacilli.  PCR identification as a Salmonella species.

## 2023-10-27 NOTE — PROGRESS NOTES
Name: Luisito Burton ADMIT: 10/25/2023   : 1965  PCP: Provider, No Known    MRN: [id number] LOS: 2 days   AGE/SEX: 58 y.o. male  ROOM: [facility]     Subjective   Subjective   with no Improving diarrheafresh bright blood per rectum or melena.  No abdominal colic.  No nausea or vomiting.  Improving appetite and p.o.  No fever or chills.  Feels stronger.    Review of Systems  .  With urine output but dark urine without dysuria or hematuria.  Cardio vascular/respiratory.  No chest pain/no shortness of breath/positive occasional palpitations/no cough  CNS.  No dizziness.  No loss of consciousness.  No focal neurological symptoms.     Objective   Objective   Vital Signs  Temp:  [98.1 °F (36.7 °C)-99.9 °F (37.7 °C)] 98.8 °F (37.1 °C)  Heart Rate:  [101-107] 105  Resp:  [18] 18  BP: (106-121)/(73-85) 106/73  SpO2:  [95 %-96 %] 95 %  on   ;   Device (Oxygen Therapy): room air    Intake/Output Summary (Last 24 hours) at 10/27/2023 1201  Last data filed at 10/27/2023 0846  Gross per 24 hour   Intake 480 ml   Output 600 ml   Net -120 ml     Body mass index is 28.29 kg/m².      10/25/23  1304   Weight: 77.1 kg (170 lb)     Physical Exam  General.  Middle-aged gentleman.  Alert and oriented x3.  No apparent pain/distress/diaphoresis.  Normal mood and affect.  Eyes.  Pupils equal round and reactive.  Intact extraocular musculature.  No pallor or jaundice.  Oral cavity.  Moist mucous membrane.  Neck.  Supple.  No JVD.  No lymphadenopathy or thyromegaly.  Cardiovascular.  Regular rate and rhythm.  Mild tachycardia.  No murmurs.  Chest.  Clear to auscultation bilaterally with no added sounds.    Abdomen.  Soft lax.    Normal bowel sounds.  No tenderness.  No guarding or rebound.  Extremities.  No clubbing/cyanosis/edema.    CNS.  No acute focal neurological deficits.        Results Review:      Results from last 7 days   Lab Units 10/27/23  0539 10/26/23  1506 10/26/23  0552 10/25/23  1318   SODIUM mmol/L 132*  --  133*  "128*   POTASSIUM mmol/L 3.5 3.2* 3.1* 3.4*   CHLORIDE mmol/L 98  --  96* 95*   CO2 mmol/L 23.0  --  21.8* 15.5*   BUN mg/dL 30*  --  55* 56*   CREATININE mg/dL 1.15  --  2.81* 5.23*   GLUCOSE mg/dL 109*  --  114* 160*   CALCIUM mg/dL 9.2  --  8.9 9.7   AST (SGOT) U/L  --   --  18 17   ALT (SGPT) U/L  --   --  28 31     Estimated Creatinine Clearance: 68.7 mL/min (by C-G formula based on SCr of 1.15 mg/dL).  Results from last 7 days   Lab Units 10/26/23  0552   HEMOGLOBIN A1C % 6.00*         Results from last 7 days   Lab Units 10/26/23  0552   CK TOTAL U/L 163         Results from last 7 days   Lab Units 10/26/23  0552   TSH uIU/mL 2.100     Results from last 7 days   Lab Units 10/27/23  0539 10/26/23  0552   MAGNESIUM mg/dL 2.0 1.8   PHOSPHORUS mg/dL 1.6* 4.4           Invalid input(s): \"LDLCALC\"  Results from last 7 days   Lab Units 10/27/23  0539 10/26/23  0552 10/25/23  1318   WBC 10*3/mm3 7.45 7.34 8.39   HEMOGLOBIN g/dL 15.7 17.0 18.4*   HEMATOCRIT % 45.8 48.8 53.7*   PLATELETS 10*3/mm3 226 297 360   MCV fL 90.2 89.4 89.2   MCH pg 30.9 31.1 30.6   MCHC g/dL 34.3 34.8 34.3   RDW % 13.0 13.3 12.9   RDW-SD fl 42.9 43.4 43.3   MPV fL 10.5 10.2 9.9   NEUTROPHIL % % 74.4 76.7* 75.3   LYMPHOCYTE % % 10.1* 7.6* 10.3*   MONOCYTES % % 14.2* 15.0* 13.9*   EOSINOPHIL % % 0.3 0.0* 0.0*   BASOPHIL % % 0.5 0.3 0.1   IMM GRAN % % 0.5  --  0.4   NEUTROS ABS 10*3/mm3 5.54 5.63 6.32   LYMPHS ABS 10*3/mm3 0.75 0.56* 0.86   MONOS ABS 10*3/mm3 1.06* 1.10* 1.17*   EOS ABS 10*3/mm3 0.02 0.00 0.00   BASOS ABS 10*3/mm3 0.04 0.02 0.01   IMMATURE GRANS (ABS) 10*3/mm3 0.04  --  0.03   NRBC /100 WBC 0.0  --   --              Results from last 7 days   Lab Units 10/25/23  1825 10/25/23  1441   LACTATE mmol/L 2.0 2.7*         Results from last 7 days   Lab Units 10/25/23  1318   LIPASE U/L 32         Results from last 7 days   Lab Units 10/25/23  2007   ADENOVIRUS  Not Detected     Results from last 7 days   Lab Units 10/26/23  0430 "   NITRITE UA  Negative   WBC UA /HPF None Seen   BACTERIA UA /HPF Trace*   SQUAM EPITHEL UA /HPF 0-2     Results from last 7 days   Lab Units 10/27/23  0539 10/26/23  0430   SODIUM UR mmol/L  --  20   CREATININE UR mg/dL  --  234.8   CHLORIDE UR mmol/L  --  <20   OSMOLALITY UR mOsm/kg  --  736   PROTEIN TOTAL URINE mg/dL  --  78.5   URIC ACID mg/dL 10.3*  --        Imaging:  Imaging Results (Last 24 Hours)       ** No results found for the last 24 hours. **               I reviewed the patient's new clinical results / labs / tests / procedures      Assessment/Plan     Active Hospital Problems    Diagnosis  POA    **ARF (acute renal failure) [N17.9]  Yes    Nausea and vomiting [R11.2]  Yes    E coli enteritis [A04.4]  Yes    Dehydration [E86.0]  Yes    Diarrhea [R19.7]  Yes      Resolved Hospital Problems   No resolved problems to display.           Salmonella enteritis leading to nausea and vomiting and diarrhea.  Benign GI examination.  Low suspicion for C. difficile.  Resolved nausea and vomiting.  Improved diarrhea.  Will continue IV fluids/IV Zofran/p.o. Colesytramine.    No need for antibiotic.  Blood cultures are pending..  Normal liver function test.  Acute kidney failure/hypokalemia/hypomagnesemia/metabolic acidosis mostly secondary to prerenal azotemia leading to hypovolemia and subsequent acute tubular necrosis.  Improving on IV fluid.  Good urine output.  Appreciate nephrology feedback.  Improved renal function and hypovolemia with IV fluid.  We will continue to monitor.  Hypokalemia resolved with substitution.  Acidosis resolved.  .  Magnesium is normal.  Hypophosphatemia being replaced.   Prediabetes.  Diet at discharge.  VTE prophylaxis.  Subcu heparin    Discussed my findings and plan of treatment with the patient/family/nurses at multidisciplinary rounds.  Disposition.  Hopefully home tomorrow if continues to improve      Corie West MD  [facility]  10/27/23  12:01  EDT

## 2023-10-27 NOTE — PLAN OF CARE
Goal Outcome Evaluation:  Plan of Care Reviewed With: patient        Progress: no change  Outcome Evaluation: VSS. Appetite improving. No complaints of pain. Positive blood cultures .started on Rocephin. ID consulted. IV fluids continue and electrolytes replaced. Loose stools persist but lessened. WCTM.

## 2023-10-27 NOTE — PROGRESS NOTES
"   LOS: 2 days     Chief Complaint/ Reason for encounter: Acute renal failure with acidosis and electrolyte abnormalities    Subjective   10/27/23 : He feels substantially better today, eating and drinking a lot better but still not back to his usual baseline  No fevers or chills, decreased nausea, decreased abdominal pain and vomiting, decreased diarrhea  Tmax 99.9, making more urine but it is still dark      Medical history reviewed:  History of Present Illness    Subjective    History taken from: Patient and chart    Vital Signs  Temp:  [98.1 °F (36.7 °C)-99.9 °F (37.7 °C)] 98.8 °F (37.1 °C)  Heart Rate:  [101-107] 105  Resp:  [18] 18  BP: (106-121)/(73-85) 106/73       Wt Readings from Last 1 Encounters:   10/25/23 1304 77.1 kg (170 lb)       Objective:  Vital signs: (most recent): Blood pressure 106/73, pulse 105, temperature 98.8 °F (37.1 °C), temperature source Oral, resp. rate 18, height 165.1 cm (65\"), weight 77.1 kg (170 lb), SpO2 95%.                Objective:  General Appearance:  Comfortable, well-appearing, in no acute distress and not in pain.  Awake, alert, oriented  HEENT: Mucous membranes moist, no injury, oropharynx clear  Lungs:  Normal effort and normal respiratory rate.  Breath sounds clear to auscultation.  No  respiratory distress.  No rales, decreased breath sounds or rhonchi.    Heart: Normal rate.  Regular rhythm.  S1, S2 normal.  No murmur.   Abdomen: Abdomen is soft.  Bowel sounds are normal, no abdominal tenderness.  There is no rebound or guarding  Extremities: Trace edema of bilateral lower extremities  Neurological: No focal motor or sensory deficits, pupils reactive  Skin:  Warm and dry.  No rash or cyanosis.       Results Review:    Intake/Output:     Intake/Output Summary (Last 24 hours) at 10/27/2023 0916  Last data filed at 10/27/2023 0846  Gross per 24 hour   Intake 480 ml   Output 775 ml   Net -295 ml         DATA:  Radiology and Labs:  The following labs independently " reviewed by me. Additional labs ordered for tomorrow a.m.  Interval notes, chart personally reviewed by me.   Old records independently reviewed showing normal baseline renal function  The following radiologic studies independently viewed by me, findings chest x-ray nothing acute  New problems include hypophosphatemia, persistent hypokalemia from GI losses  Discussed with patient and family at bedside    Risk/ complexity of medical care/ medical decision making high risk, Salmonella enteric colitis with renal failure    Labs:   Recent Results (from the past 24 hour(s))   Potassium    Collection Time: 10/26/23  3:06 PM    Specimen: Blood   Result Value Ref Range    Potassium 3.2 (L) 3.5 - 5.2 mmol/L   Renal Function Panel    Collection Time: 10/27/23  5:39 AM    Specimen: Blood   Result Value Ref Range    Glucose 109 (H) 65 - 99 mg/dL    BUN 30 (H) 6 - 20 mg/dL    Creatinine 1.15 0.76 - 1.27 mg/dL    Sodium 132 (L) 136 - 145 mmol/L    Potassium 3.5 3.5 - 5.2 mmol/L    Chloride 98 98 - 107 mmol/L    CO2 23.0 22.0 - 29.0 mmol/L    Calcium 9.2 8.6 - 10.5 mg/dL    Albumin 3.5 3.5 - 5.2 g/dL    Phosphorus 1.6 (C) 2.5 - 4.5 mg/dL    Anion Gap 11.0 5.0 - 15.0 mmol/L    BUN/Creatinine Ratio 26.1 (H) 7.0 - 25.0    eGFR 74.7 >60.0 mL/min/1.73   Magnesium    Collection Time: 10/27/23  5:39 AM    Specimen: Blood   Result Value Ref Range    Magnesium 2.0 1.6 - 2.6 mg/dL   Uric Acid    Collection Time: 10/27/23  5:39 AM    Specimen: Blood   Result Value Ref Range    Uric Acid 10.3 (H) 3.4 - 7.0 mg/dL   CBC Auto Differential    Collection Time: 10/27/23  5:39 AM    Specimen: Blood   Result Value Ref Range    WBC 7.45 3.40 - 10.80 10*3/mm3    RBC 5.08 4.14 - 5.80 10*6/mm3    Hemoglobin 15.7 13.0 - 17.7 g/dL    Hematocrit 45.8 37.5 - 51.0 %    MCV 90.2 79.0 - 97.0 fL    MCH 30.9 26.6 - 33.0 pg    MCHC 34.3 31.5 - 35.7 g/dL    RDW 13.0 12.3 - 15.4 %    RDW-SD 42.9 37.0 - 54.0 fl    MPV 10.5 6.0 - 12.0 fL    Platelets 226 140 - 450  10*3/mm3    Neutrophil % 74.4 42.7 - 76.0 %    Lymphocyte % 10.1 (L) 19.6 - 45.3 %    Monocyte % 14.2 (H) 5.0 - 12.0 %    Eosinophil % 0.3 0.3 - 6.2 %    Basophil % 0.5 0.0 - 1.5 %    Immature Grans % 0.5 0.0 - 0.5 %    Neutrophils, Absolute 5.54 1.70 - 7.00 10*3/mm3    Lymphocytes, Absolute 0.75 0.70 - 3.10 10*3/mm3    Monocytes, Absolute 1.06 (H) 0.10 - 0.90 10*3/mm3    Eosinophils, Absolute 0.02 0.00 - 0.40 10*3/mm3    Basophils, Absolute 0.04 0.00 - 0.20 10*3/mm3    Immature Grans, Absolute 0.04 0.00 - 0.05 10*3/mm3    nRBC 0.0 0.0 - 0.2 /100 WBC       Radiology:  Pertinent radiology studies were reviewed as described above      Medications have been reviewed separately in chart overview      ASSESSMENT:  severe renal failure.  Prerenal, markedly improved  Nausea vomiting and diarrhea, improving but still not back to normal appetite/oral intake  Salmonella enteritis  Hypokalemia from GI losses, improved but still low  New hypophosphatemia  Metabolic acidosis from renal failure and lactic acidosis, improving  Proteinuria, recheck after hydration  History of hypertension, not on meds  Severe dehydration  Hypovolemic hyponatremia, improved with IV fluids        DISCUSSION/PLAN:   Renal function continues to rapidly improve, nearing baseline  Urine still dark and oral intake not back to normal so we will continue IV fluids another 24 hours  Replace phosphorus and potassium orally  Otherwise, supportive care for his Salmonella enteritis  Acidosis improved and sodium fairly stable  Minimal proteinuria per quantification, suspect the urine sample was concentrated    Continue to monitor volume and electrolytes closely including daily magnesium and phosphorus levels     avoid IV contrast and nephrotoxic medications   Follow-up labs, discharge planning    Please send me a secure chat message if any questions regarding patient care.    Hao Allen MD  [facility]   Office phone number:  [phone]  Answering service phone number: [phone]    10/27/23  09:16 EDT    Dictation performed using Dragon dictation [facility]

## 2023-10-27 NOTE — PLAN OF CARE
Goal Outcome Evaluation:  Plan of Care Reviewed With: patient        Progress: improving  Outcome Evaluation: Pt remains sinus tach on the monitor, otherwise VSS.  Pt is alert and oriented x4, up ad alicia, on room air.  Pt started on Questran and subq heparin.  Blood cultures drawn.  Continue IVFs at this time.

## 2023-10-27 NOTE — CONSULTS
CONSULT NOTE    Infectious Diseases - Robin Lagunas MD  [facility]       Patient Identification:  Name: Luisito Burton  Age: 58 y.o.  Sex: male  :  1965  MRN: [id number]             Date of Consultation: 10/27/2023      Primary Care Physician: Provider, No Known                               Requesting Physician:     Reason for Consultation: Gram-negative bacteremia with enteric pathogen PCR positive for Salmonella    Background and history of presenting illness: Patient is a 58-year-old male who is otherwise healthy and does not take any medications and was in his usual state of his health until about 6 or 7 days ago when he recalls eating fresh for meds that he left on his kitchen counter for few days prior to preparing a meal with those eggs.  According to him he was fine for few days then started having nausea vomiting and diarrhea which was incessant.  Patient got some telehealth evaluation and was started on Zofran but his nausea was not getting any better.  After fighting the symptoms for 3-4 days he eventually presented to [facility] emergency room [place] and was admitted for severe gastroenteritis with associated dehydration and acute renal failure.  Upon arrival stool studies were sent and blood cultures were ordered and patient was started on aggressive supportive care with improvement in his symptoms of nausea vomiting and sense of wellbeing.  Stool studies come back positive for Salmonella pathogen and again to his hospitalization on 10/26/2023 patient spiked a low-grade temperature for which blood cultures were drawn and earlier today it come back positive for gram-negative bacilli.  Initially these were identified as Salmonella species correlating with enteric pathogen PCR which also detected Salmonella.  Nobody else in those eggs and having similar symptoms.  Patient is not immunocompromised by history and does not take any medications for  immunosuppression.  At present patient feels better and denies any localizing symptoms of neck pain back pain hip pain shoulder pain knee pain.  His diarrhea and nausea is better.  Patient has been appropriately started on IV ceftriaxone by primary team and infectious disease service is consulted.        Impression: This presentation is consistent with:  1-bacteremic Salmonella gastroenteritis of nontypeable type likely due to contaminated/infected farm eggs stored on room temperature for few days prior to consumption 3 to 4 days prior to onset of the symptoms-overall significant improvement with supportive care  2-severe metabolic compromise with dehydration and acute renal failure due to severe bacteremic gastroenteritis overall improved with supportive care  3-other diagnoses per primary team.      Recommendations/Discussions:  In general non-bacteremic gastroenteritis and otherwise healthy patient does not require any systemic antibiotic therapy and patient usually improves with supportive care.  Antibiotic therapy for non-bacteremic gastroenteritis and otherwise healthy patient with no concern for immunodeficiency due to Salmonella can cause prolonged shedding of Salmonella in the stool and creates situation for secondary infection due to fecal oral transmission to other hosts.  However in situations of bacteremic gastroenteritis antibiotic therapy is recommended and ideal treatment in this situation while awaiting final sensitivity of the Salmonella could either be IV or oral quinolone such as ciprofloxacin or Levaquin or IV ceftriaxone.  Would recommend continue with IV ceftriaxone while awaiting the sensitivity of the Salmonella in the next day or 2.    Would also recommend repeating blood cultures after patient has received antibiotic therapy to document clearance of bacteremia.  Patient will require exhaustive antibiotic review system to identify area of secondary seeding which can potentially occur due to  prolonged bacteremic Salmonella gastroenteritis.  At present patient does not have any symptoms suggestive of secondary seeding.  Once sensitivities available the oral option would be either ciprofloxacin or Levaquin for 14 days from last negative blood culture or Bactrim DS for 14 days from last negative blood culture.  I explained to the patient and the family member at the bedside about potential side effects of antibiotic therapy in general which could range from fever chills nausea vomiting diarrhea rash worsening hepatic and renal dysfunction cytopenias interaction with other medications and risk for C. difficile infection and yeast infection.  I also explained specific side effects that could occur from the use of quinolone such as potential for ligament and tendon weakness and rupture, neuropsychiatric symptoms, neuropathy, weakening of the large arteries with risk of aneurysm and potential for QTc prolongation and cardiac arrhythmias.  I also explained to the patient about specific side effects of trimethoprim/sulfamethoxazole i.e. Bactrim such as potential for rash, photosensitivity, hyperkalemia and renal insufficiency.  Patient will be provided reading material about these antibiotics once he is considered stable and ready to be discharged and switch to 1 of these agents based on his acceptance of either choice assuming that the pathogen found is sensitive.  Patient does concede and accepts antibiotic treatment is indicated for this bacteremic Salmonella gastroenteritis and wants to proceed with it.  Thank you Dr. White for letting me be the part of your patient care please see above impression and recommendations      History of Present Illness:   Patient is a 58-year-old male who is otherwise healthy and does not take any medications and was in his usual state of his health until about 6 or 7 days ago when he recalls eating fresh for meds that he left on his kitchen counter for few days prior to  preparing a meal with those eggs.  According to him he was fine for few days then started having nausea vomiting and diarrhea which was incessant.  Patient got some telehealth evaluation and was started on Zofran but his nausea was not getting any better.  After fighting the symptoms for 3-4 days he eventually presented to [facility] [place] emergency room campus and was admitted for severe gastroenteritis with associated dehydration and acute renal failure.  Upon arrival stool studies were sent and blood cultures were ordered and patient was started on aggressive supportive care with improvement in his symptoms of nausea vomiting and sense of wellbeing.  Stool studies come back positive for Salmonella pathogen and again to his hospitalization on 10/26/2023 patient spiked a low-grade temperature for which blood cultures were drawn and earlier today it come back positive for gram-negative bacilli.  Initially these were identified as Salmonella species correlating with enteric pathogen PCR which also detected Salmonella.  Nobody else in those eggs and having similar symptoms.  Patient is not immunocompromised by history and does not take any medications for immunosuppression.  At present patient feels better and denies any localizing symptoms of neck pain back pain hip pain shoulder pain knee pain.  His diarrhea and nausea is better.  Patient has been appropriately started on IV ceftriaxone by primary team and infectious disease service is consulted.       Past Medical History:  History reviewed. No pertinent past medical history.  Past Surgical History:  Past Surgical History:   Procedure Laterality Date    FACIAL FRACTURE SURGERY      SHOULDER SURGERY        Home Meds:  Medications Prior to Admission   Medication Sig Dispense Refill Last Dose    ondansetron (ZOFRAN) 8 MG tablet Take 1 tablet by mouth Every 8 (Eight) Hours As Needed for Nausea or Vomiting.   10/25/2023     Current Meds:     Current  Facility-Administered Medications:     acetaminophen (TYLENOL) tablet 650 mg, 650 mg, Oral, Q4H PRN, 650 mg at 10/25/23 2043 **OR** acetaminophen (TYLENOL) 160 MG/5ML oral solution 650 mg, 650 mg, Oral, Q4H PRN **OR** acetaminophen (TYLENOL) suppository 650 mg, 650 mg, Rectal, Q4H PRN, Benjy Guevara MD    cefTRIAXone (ROCEPHIN) 2,000 mg in sodium chloride 0.9 % 100 mL IVPB-VTB, 2,000 mg, Intravenous, Q24H, Corie West MD, Last Rate: 200 mL/hr at 10/27/23 1431, 2,000 mg at 10/27/23 1431    cholestyramine (QUESTRAN) packet 1 packet, 1 packet, Oral, Q8H, Corie West MD, 1 packet at 10/27/23 1130    heparin (porcine) 5000 UNIT/ML injection 5,000 Units, 5,000 Units, Subcutaneous, Q8H, Corie West MD, 5,000 Units at 10/27/23 1454    HYDROcodone-acetaminophen (NORCO) 5-325 MG per tablet 1 tablet, 1 tablet, Oral, Q4H PRN, Benjy Guevara MD    lactated ringers infusion, 100 mL/hr, Intravenous, Continuous, Benjy Guevara MD, Last Rate: 100 mL/hr at 10/27/23 0941, 100 mL/hr at 10/27/23 0941    nitroglycerin (NITROSTAT) SL tablet 0.4 mg, 0.4 mg, Sublingual, Q5 Min PRN, Benjy Guevara MD    ondansetron (ZOFRAN) tablet 4 mg, 4 mg, Oral, Q6H PRN **OR** ondansetron (ZOFRAN) injection 4 mg, 4 mg, Intravenous, Q6H PRN, Benjy Guevara MD    phosphorus (K PHOS NEUTRAL) tablet 2 tablet, 500 mg, Oral, 4x Daily, Hao Allen MD, 2 tablet at 10/27/23 1302    sodium chloride 0.9 % flush 10 mL, 10 mL, Intravenous, Q12H, Benjy Guevara MD, 10 mL at 10/27/23 0823    sodium chloride 0.9 % flush 10 mL, 10 mL, Intravenous, PRN, Benjy Guevara MD    sodium chloride 0.9 % infusion 40 mL, 40 mL, Intravenous, PRN, Benjy Guevara MD  Allergies:  No Known Allergies  Social History:   Social History     Tobacco Use    Smoking status: Never    Smokeless tobacco: Never   Substance Use Topics    Alcohol use: Yes      Family History:  Family History   Problem Relation Age of Onset     "Arthritis Father           Review of Systems  See history of present illness and past medical history.  Overall feeling significantly better  At the time of admission review system was recorded as follows:  Constitutional:  Positive for chills and diaphoresis.   HENT: Negative.     Respiratory: Negative.     Cardiovascular: Negative.    Gastrointestinal:  Positive for diarrhea, nausea and vomiting. Negative for abdominal pain and blood in stool.   Genitourinary:  Positive for decreased urine volume.   Musculoskeletal:  Positive for myalgias.   Neurological:  Positive for weakness.   All other systems reviewed and are negative.    Vitals:   /81 (BP Location: Right arm, Patient Position: Lying)   Pulse 102   Temp 98.8 °F (37.1 °C) (Oral)   Resp 18   Ht 165.1 cm (65\")   Wt 77.1 kg (170 lb)   SpO2 97%   BMI 28.29 kg/m²   I/O:   Intake/Output Summary (Last 24 hours) at 10/27/2023 1611  Last data filed at 10/27/2023 1330  Gross per 24 hour   Intake 480 ml   Output 700 ml   Net -220 ml     Exam:  Patient is examined using the personal protective equipment as per guidelines from infection control for this particular patient as enacted.  Hand washing was performed before and after patient interaction.  General Appearance:    Alert, cooperative, no distress, appears stated age   Head:    Normocephalic, without obvious abnormality, atraumatic   Eyes:    PERRL, conjunctivae/corneas clear, EOM's intact, both eyes   Ears:    Normal external ear canals, both ears   Nose:   Nares normal, septum midline, mucosa normal, no drainage    or sinus tenderness   Throat:   Lips, tongue, gums normal; oral mucosa pink and moist   Neck:   Supple, symmetrical, trachea midline, no adenopathy;     thyroid:  no enlargement/tenderness/nodules; no carotid    bruit or JVD   Back:     Symmetric, no curvature, ROM normal, no CVA tenderness   Lungs:     Clear to auscultation bilaterally, respirations unlabored   Chest Wall:    No tenderness " or deformity    Heart:  S1-S2 regular   Abdomen:   Soft nontender   Extremities:   Extremities normal, atraumatic, no cyanosis or edema   Pulses:   Pulses palpable in all extremities; symmetric all extremities   Skin:   Skin color normal, Skin is warm and dry,  no rashes or palpable lesions   Neurologic: Alert and oriented and grossly nonfocal       Data Review:    I reviewed the patient's new clinical results.  Results from last 7 days   Lab Units 10/27/23  0539 10/26/23  0552 10/25/23  1318   WBC 10*3/mm3 7.45 7.34 8.39   HEMOGLOBIN g/dL 15.7 17.0 18.4*   PLATELETS 10*3/mm3 226 297 360     Results from last 7 days   Lab Units 10/27/23  0539 10/26/23  1506 10/26/23  0552 10/25/23  1318   SODIUM mmol/L 132*  --  133* 128*   POTASSIUM mmol/L 3.5 3.2* 3.1* 3.4*   CHLORIDE mmol/L 98  --  96* 95*   CO2 mmol/L 23.0  --  21.8* 15.5*   BUN mg/dL 30*  --  55* 56*   CREATININE mg/dL 1.15  --  2.81* 5.23*   CALCIUM mg/dL 9.2  --  8.9 9.7   GLUCOSE mg/dL 109*  --  114* 160*     Microbiology Results (last 10 days)       Procedure Component Value - Date/Time    Blood Culture - Blood, Arm, Right [[id number]]  (Abnormal) Collected: 10/26/23 2112    Lab Status: Preliminary result Specimen: Blood from Arm, Right Updated: 10/27/23 1245     Blood Culture Abnormal Stain     Gram Stain Anaerobic Bottle Gram negative bacilli    Blood Culture - Blood, Arm, Right [[id number]]  (Abnormal) Collected: 10/26/23 2112    Lab Status: Preliminary result Specimen: Blood from Arm, Right Updated: 10/27/23 1402     Blood Culture Abnormal Stain     Gram Stain Aerobic Bottle Gram negative bacilli    Blood Culture ID, PCR - Blood, Arm, Right [[id number]]  (Abnormal) Collected: 10/26/23 2112    Lab Status: Final result Specimen: Blood from Arm, Right Updated: 10/27/23 1406     BCID, PCR Salmonella spp. Identification by BCID2 PCR     BOTTLE TYPE Anaerobic Bottle    Narrative:      No resistance genes detected.    Gastrointestinal Panel, PCR - Stool, Per  Rectum [[id number]]  (Abnormal) Collected: 10/25/23 2007    Lab Status: Final result Specimen: Stool from Per Rectum Updated: 10/25/23 2214     Campylobacter Not Detected     Plesiomonas shigelloides Not Detected     Salmonella Detected     Vibrio Not Detected     Vibrio cholerae Not Detected     Yersinia enterocolitica Not Detected     Enteroaggregative E. coli (EAEC) Not Detected     Enteropathogenic E. coli (EPEC) Not Detected     Enterotoxigenic E. coli (ETEC) lt/st Not Detected     Shiga-like toxin-producing E. coli (STEC) stx1/stx2 Not Detected     Shigella/Enteroinvasive E. coli (EIEC) Not Detected     Cryptosporidium Not Detected     Cyclospora cayetanensis Not Detected     Entamoeba histolytica Not Detected     Giardia lamblia Not Detected     Adenovirus F40/41 Not Detected     Astrovirus Not Detected     Norovirus GI/GII Not Detected     Rotavirus A Not Detected     Sapovirus (I, II, IV or V) Not Detected    Stool Culture, Targeted - Stool, Per Rectum [[id number]] Collected: 10/25/23 2007    Lab Status: Preliminary result Specimen: Stool from Per Rectum Updated: 10/27/23 0957     Stool Culture Culture in progress          No radiology results for the last 30 days.  Brief Urine Lab Results  (Last result in the past 365 days)        Color   Clarity   Blood   Leuk Est   Nitrite   Protein   CREAT   Urine HCG        10/26/23 0430             234.8         10/26/23 0430 Yellow   Clear   Moderate (2+)   Negative   Negative   100 mg/dL (2+)                     Assessment:  Active Hospital Problems    Diagnosis  POA    **ARF (acute renal failure) [N17.9]  Yes    Gram-negative bacteremia [R78.81]  Yes    Nausea and vomiting [R11.2]  Yes    E coli enteritis [A04.4]  Yes    Dehydration [E86.0]  Yes    Diarrhea [R19.7]  Yes      Resolved Hospital Problems   No resolved problems to display.         Plan:  See above  Robin Donovan MD   10/27/2023  16:11 EDT    Parts of this note may be an electronic  transcription/translation of spoken language to printed text using the Dragon dictation system.

## 2023-10-28 LAB
ALBUMIN SERPL-MCNC: 3.6 G/DL (ref 3.5–5.2)
ANION GAP SERPL CALCULATED.3IONS-SCNC: 11.7 MMOL/L (ref 5–15)
BUN SERPL-MCNC: 16 MG/DL (ref 6–20)
BUN/CREAT SERPL: 18.4 (ref 7–25)
CALCIUM SPEC-SCNC: 9.2 MG/DL (ref 8.6–10.5)
CHLORIDE SERPL-SCNC: 97 MMOL/L (ref 98–107)
CO2 SERPL-SCNC: 26.3 MMOL/L (ref 22–29)
CREAT SERPL-MCNC: 0.87 MG/DL (ref 0.76–1.27)
DEPRECATED RDW RBC AUTO: 42.3 FL (ref 37–54)
EGFRCR SERPLBLD CKD-EPI 2021: 101.3 ML/MIN/1.73
ERYTHROCYTE [DISTWIDTH] IN BLOOD BY AUTOMATED COUNT: 13.1 % (ref 12.3–15.4)
GLUCOSE SERPL-MCNC: 98 MG/DL (ref 65–99)
HCT VFR BLD AUTO: 41.7 % (ref 37.5–51)
HGB BLD-MCNC: 14.7 G/DL (ref 13–17.7)
LYMPHOCYTES # BLD MANUAL: 0.81 10*3/MM3 (ref 0.7–3.1)
LYMPHOCYTES NFR BLD MANUAL: 18 % (ref 5–12)
MAGNESIUM SERPL-MCNC: 2.2 MG/DL (ref 1.6–2.6)
MCH RBC QN AUTO: 31.3 PG (ref 26.6–33)
MCHC RBC AUTO-ENTMCNC: 35.3 G/DL (ref 31.5–35.7)
MCV RBC AUTO: 88.9 FL (ref 79–97)
MONOCYTES # BLD: 1.45 10*3/MM3 (ref 0.1–0.9)
NEUTROPHILS # BLD AUTO: 5.81 10*3/MM3 (ref 1.7–7)
NEUTROPHILS NFR BLD MANUAL: 72 % (ref 42.7–76)
PHOSPHATE SERPL-MCNC: 2.3 MG/DL (ref 2.5–4.5)
PLAT MORPH BLD: NORMAL
PLATELET # BLD AUTO: 228 10*3/MM3 (ref 140–450)
PMV BLD AUTO: 9.7 FL (ref 6–12)
POTASSIUM SERPL-SCNC: 3 MMOL/L (ref 3.5–5.2)
RBC # BLD AUTO: 4.69 10*6/MM3 (ref 4.14–5.8)
RBC MORPH BLD: NORMAL
SODIUM SERPL-SCNC: 135 MMOL/L (ref 136–145)
VARIANT LYMPHS NFR BLD MANUAL: 10 % (ref 19.6–45.3)
WBC MORPH BLD: NORMAL
WBC NRBC COR # BLD: 8.07 10*3/MM3 (ref 3.4–10.8)

## 2023-10-28 PROCEDURE — 85007 BL SMEAR W/DIFF WBC COUNT: CPT | Performed by: INTERNAL MEDICINE

## 2023-10-28 PROCEDURE — 25810000003 LACTATED RINGERS PER 1000 ML: Performed by: INTERNAL MEDICINE

## 2023-10-28 PROCEDURE — 25010000002 HEPARIN (PORCINE) PER 1000 UNITS: Performed by: INTERNAL MEDICINE

## 2023-10-28 PROCEDURE — 85025 COMPLETE CBC W/AUTO DIFF WBC: CPT | Performed by: INTERNAL MEDICINE

## 2023-10-28 PROCEDURE — 83735 ASSAY OF MAGNESIUM: CPT | Performed by: INTERNAL MEDICINE

## 2023-10-28 PROCEDURE — 87040 BLOOD CULTURE FOR BACTERIA: CPT | Performed by: INTERNAL MEDICINE

## 2023-10-28 PROCEDURE — 25010000002 CEFTRIAXONE PER 250 MG: Performed by: INTERNAL MEDICINE

## 2023-10-28 PROCEDURE — 80069 RENAL FUNCTION PANEL: CPT | Performed by: INTERNAL MEDICINE

## 2023-10-28 PROCEDURE — 25810000003 SODIUM CHLORIDE 0.9 % WITH KCL 40 MEQ/L 40-0.9 MEQ/L-% SOLUTION: Performed by: INTERNAL MEDICINE

## 2023-10-28 RX ORDER — POTASSIUM CHLORIDE 1.5 G/1.58G
20 POWDER, FOR SOLUTION ORAL ONCE
Status: COMPLETED | OUTPATIENT
Start: 2023-10-28 | End: 2023-10-28

## 2023-10-28 RX ORDER — SODIUM CHLORIDE AND POTASSIUM CHLORIDE 300; 900 MG/100ML; MG/100ML
100 INJECTION, SOLUTION INTRAVENOUS CONTINUOUS
Status: DISPENSED | OUTPATIENT
Start: 2023-10-28 | End: 2023-10-29

## 2023-10-28 RX ADMIN — CEFTRIAXONE 2000 MG: 2 INJECTION, POWDER, FOR SOLUTION INTRAMUSCULAR; INTRAVENOUS at 14:38

## 2023-10-28 RX ADMIN — POTASSIUM CHLORIDE 20 MEQ: 1.5 FOR SOLUTION ORAL at 14:38

## 2023-10-28 RX ADMIN — HEPARIN SODIUM 5000 UNITS: 5000 INJECTION INTRAVENOUS; SUBCUTANEOUS at 06:00

## 2023-10-28 RX ADMIN — Medication 10 ML: at 08:44

## 2023-10-28 RX ADMIN — CHOLESTYRAMINE 1 PACKET: 4 POWDER, FOR SUSPENSION ORAL at 02:05

## 2023-10-28 RX ADMIN — HEPARIN SODIUM 5000 UNITS: 5000 INJECTION INTRAVENOUS; SUBCUTANEOUS at 14:37

## 2023-10-28 RX ADMIN — Medication 10 ML: at 20:01

## 2023-10-28 RX ADMIN — CHOLESTYRAMINE 1 PACKET: 4 POWDER, FOR SUSPENSION ORAL at 12:16

## 2023-10-28 RX ADMIN — DIBASIC SODIUM PHOSPHATE, MONOBASIC POTASSIUM PHOSPHATE AND MONOBASIC SODIUM PHOSPHATE 2 TABLET: 852; 155; 130 TABLET ORAL at 11:01

## 2023-10-28 RX ADMIN — CHOLESTYRAMINE 1 PACKET: 4 POWDER, FOR SUSPENSION ORAL at 18:29

## 2023-10-28 RX ADMIN — SODIUM CHLORIDE, POTASSIUM CHLORIDE, SODIUM LACTATE AND CALCIUM CHLORIDE 100 ML/HR: 600; 310; 30; 20 INJECTION, SOLUTION INTRAVENOUS at 06:01

## 2023-10-28 RX ADMIN — HEPARIN SODIUM 5000 UNITS: 5000 INJECTION INTRAVENOUS; SUBCUTANEOUS at 21:58

## 2023-10-28 RX ADMIN — DIBASIC SODIUM PHOSPHATE, MONOBASIC POTASSIUM PHOSPHATE AND MONOBASIC SODIUM PHOSPHATE 2 TABLET: 852; 155; 130 TABLET ORAL at 08:44

## 2023-10-28 RX ADMIN — DIBASIC SODIUM PHOSPHATE, MONOBASIC POTASSIUM PHOSPHATE AND MONOBASIC SODIUM PHOSPHATE 2 TABLET: 852; 155; 130 TABLET ORAL at 17:12

## 2023-10-28 RX ADMIN — POTASSIUM CHLORIDE AND SODIUM CHLORIDE 100 ML/HR: 900; 300 INJECTION, SOLUTION INTRAVENOUS at 16:32

## 2023-10-28 NOTE — PROGRESS NOTES
"  Infectious Diseases Progress Note    Robin Donovan MD     [facility]  [place]: 3 days  Patient Identification:  Name: Luisito Burton  Age: 58 y.o.  Sex: male  :  1965  MRN: [id number]         Primary Care Physician: Provider, No Known        Subjective: Feeling better Up and About and has good energy denies any fever and chills.  Interval History: See consultation note.    Objective:    Scheduled Meds:cefTRIAXone, 2,000 mg, Intravenous, Q24H  cholestyramine, 1 packet, Oral, Q8H  heparin (porcine), 5,000 Units, Subcutaneous, Q8H  phosphorus, 500 mg, Oral, 4x Daily  sodium chloride, 10 mL, Intravenous, Q12H      Continuous Infusions:lactated ringers, 100 mL/hr, Last Rate: 100 mL/hr (10/28/23 0601)        Vital signs in last 24 hours:  Temp:  [98.1 °F (36.7 °C)-98.8 °F (37.1 °C)] 98.1 °F (36.7 °C)  Heart Rate:  [101-110] 101  Resp:  [18-20] 18  BP: (114-123)/(81-86) 119/86    Intake/Output:    Intake/Output Summary (Last 24 hours) at 10/28/2023 1138  Last data filed at 10/28/2023 0845  Gross per 24 hour   Intake 700 ml   Output 300 ml   Net 400 ml       Exam:  /86 (BP Location: Right arm, Patient Position: Sitting)   Pulse 101   Temp 98.1 °F (36.7 °C) (Oral)   Resp 18   Ht 165.1 cm (65\")   Wt 77.1 kg (170 lb)   SpO2 95%   BMI 28.29 kg/m²   Patient is examined using the personal protective equipment as per guidelines from infection control for this particular patient as enacted.  Hand washing was performed before and after patient interaction.  General Appearance:    Alert, cooperative, no distress, AAOx3                          Head:    Normocephalic, without obvious abnormality, atraumatic                           Eyes:    PERRL, conjunctivae/corneas clear, EOM's intact, both eyes                         Throat:   Lips, tongue, gums normal; oral mucosa pink and moist                           Neck:   Supple, symmetrical, trachea midline, no JVD                         Lungs:    " Clear to auscultation bilaterally, respirations unlabored                 Chest Wall:    No tenderness or deformity                          Heart:  S1-S2 regular                  Abdomen:   Soft nontender                 Extremities:   Extremities normal, atraumatic, no cyanosis or edema                        Pulses:   Pulses palpable in all extremities                            Skin:   Skin is warm and dry,  no rashes or palpable lesions                  Neurologic: Alert and oriented and grossly nonfocal    Data Review:    I reviewed the patient's new clinical results.  Results from last 7 days   Lab Units 10/28/23  0631 10/27/23  0539 10/26/23  0552 10/25/23  1318   WBC 10*3/mm3 8.07 7.45 7.34 8.39   HEMOGLOBIN g/dL 14.7 15.7 17.0 18.4*   PLATELETS 10*3/mm3 228 226 297 360     Results from last 7 days   Lab Units 10/28/23  0631 10/27/23  0539 10/26/23  1506 10/26/23  0552 10/25/23  1318   SODIUM mmol/L 135* 132*  --  133* 128*   POTASSIUM mmol/L 3.0* 3.5 3.2* 3.1* 3.4*   CHLORIDE mmol/L 97* 98  --  96* 95*   CO2 mmol/L 26.3 23.0  --  21.8* 15.5*   BUN mg/dL 16 30*  --  55* 56*   CREATININE mg/dL 0.87 1.15  --  2.81* 5.23*   CALCIUM mg/dL 9.2 9.2  --  8.9 9.7   GLUCOSE mg/dL 98 109*  --  114* 160*     Microbiology Results (last 10 days)       Procedure Component Value - Date/Time    Blood Culture - Blood, Arm, Right [[id number]]  (Abnormal) Collected: 10/26/23 2112    Lab Status: Preliminary result Specimen: Blood from Arm, Right Updated: 10/28/23 0610     Blood Culture Gram Negative Bacilli     Isolated from Aerobic and Anaerobic Bottles     Gram Stain Anaerobic Bottle Gram negative bacilli      Aerobic Bottle Gram negative bacilli    Blood Culture - Blood, Arm, Right [[id number]]  (Abnormal) Collected: 10/26/23 2112    Lab Status: Preliminary result Specimen: Blood from Arm, Right Updated: 10/28/23 0610     Blood Culture Gram Negative Bacilli     Isolated from Aerobic and Anaerobic Bottles     Gram Stain  Aerobic Bottle Gram negative bacilli      Anaerobic Bottle Gram negative bacilli    Blood Culture ID, PCR - Blood, Arm, Right [[id number]]  (Abnormal) Collected: 10/26/23 2112    Lab Status: Final result Specimen: Blood from Arm, Right Updated: 10/27/23 1406     BCID, PCR Salmonella spp. Identification by BCID2 PCR     BOTTLE TYPE Anaerobic Bottle    Narrative:      No resistance genes detected.    Gastrointestinal Panel, PCR - Stool, Per Rectum [[id number]]  (Abnormal) Collected: 10/25/23 2007    Lab Status: Final result Specimen: Stool from Per Rectum Updated: 10/25/23 2214     Campylobacter Not Detected     Plesiomonas shigelloides Not Detected     Salmonella Detected     Vibrio Not Detected     Vibrio cholerae Not Detected     Yersinia enterocolitica Not Detected     Enteroaggregative E. coli (EAEC) Not Detected     Enteropathogenic E. coli (EPEC) Not Detected     Enterotoxigenic E. coli (ETEC) lt/st Not Detected     Shiga-like toxin-producing E. coli (STEC) stx1/stx2 Not Detected     Shigella/Enteroinvasive E. coli (EIEC) Not Detected     Cryptosporidium Not Detected     Cyclospora cayetanensis Not Detected     Entamoeba histolytica Not Detected     Giardia lamblia Not Detected     Adenovirus F40/41 Not Detected     Astrovirus Not Detected     Norovirus GI/GII Not Detected     Rotavirus A Not Detected     Sapovirus (I, II, IV or V) Not Detected    Stool Culture, Targeted - Stool, Per Rectum [[id number]] Collected: 10/25/23 2007    Lab Status: Preliminary result Specimen: Stool from Per Rectum Updated: 10/27/23 0957     Stool Culture Culture in progress              Assessment:    ARF (acute renal failure)    Diarrhea    Nausea and vomiting    E coli enteritis    Dehydration    Gram-negative bacteremia  1-bacteremic Salmonella gastroenteritis of nontypeable type likely due to contaminated/infected farm eggs stored on room temperature for few days prior to consumption 3 to 4 days prior to onset of the  symptoms-overall significant improvement with supportive care  2-severe metabolic compromise with dehydration and acute renal failure due to severe bacteremic gastroenteritis overall improved with supportive care  3-other diagnoses per primary team.        Recommendations/Discussions:  See my discussions and recommendations on 10/27/2023.  Requested the nurse to provide him with information about Bactrim Levaquin and ciprofloxacin so that he can be well versed about potential side effects of these medications.  Would recommend 2 weeks of antibiotic treatment after he reviews the side effects and based on the sensitivity data of the Salmonella and the blood cultures.  Follow-up on repeat blood culture results.  Continue with.  Review systems identify areas of secondary seeding.  Continue supportive care.  Robin Donovan MD  10/28/2023  11:38 EDT    Parts of this note may be an electronic transcription/translation of spoken language to printed text using the Dragon dictation system.

## 2023-10-28 NOTE — PLAN OF CARE
Goal Outcome Evaluation:  Plan of Care Reviewed With: patient, spouse        Progress: improving  Outcome Evaluation: Continuous fluids changed from LR to NS with 40 K.  Rocephin administered per order.  Patient complaints of multiple BMs and Questran continues per order.  Patient up ad alicia.  VS and labs monitored.

## 2023-10-28 NOTE — PROGRESS NOTES
"RENAL/KCC:     LOS: 3 days     Chief Complaint/ Reason for encounter: Acute renal failure with acidosis and electrolyte abnormalities    Subjective   Chart reviewed  Still with diarrhea    Vital Signs  Temp:  [98.1 °F (36.7 °C)-98.8 °F (37.1 °C)] 98.1 °F (36.7 °C)  Heart Rate:  [101-110] 101  Resp:  [18-20] 20  BP: (114-129)/(82-88) 129/88       Wt Readings from Last 1 Encounters:   10/25/23 1304 77.1 kg (170 lb)       Objective:  Vital signs: (most recent): Blood pressure 129/88, pulse 101, temperature 98.1 °F (36.7 °C), temperature source Oral, resp. rate 20, height 165.1 cm (65\"), weight 77.1 kg (170 lb), SpO2 92%.                Objective:  General Appearance:  Comfortable, well-appearing, in no acute distress and not in pain.  Awake, alert, oriented  HEENT: Mucous membranes moist, no injury, oropharynx clear  Lungs:  Normal effort and normal respiratory rate.  Breath sounds clear to auscultation.  No  respiratory distress.  No rales, decreased breath sounds or rhonchi.    Heart: Normal rate.  Regular rhythm.  S1, S2 normal.  No murmur.   Abdomen: Abdomen is soft.  Bowel sounds are normal, no abdominal tenderness.  There is no rebound or guarding  Extremities: Trace edema of bilateral lower extremities  Neurological: No focal motor or sensory deficits, pupils reactive  Skin:  Warm and dry.  No rash or cyanosis.       Results Review:    Intake/Output:     Intake/Output Summary (Last 24 hours) at 10/28/2023 1406  Last data filed at 10/28/2023 1355  Gross per 24 hour   Intake 580 ml   Output 200 ml   Net 380 ml       Labs:   Recent Results (from the past 24 hour(s))   Lactic Acid, Plasma    Collection Time: 10/27/23  4:59 PM    Specimen: Blood   Result Value Ref Range    Lactate 1.5 0.5 - 2.0 mmol/L   Renal Function Panel    Collection Time: 10/28/23  6:31 AM    Specimen: Blood   Result Value Ref Range    Glucose 98 65 - 99 mg/dL    BUN 16 6 - 20 mg/dL    Creatinine 0.87 0.76 - 1.27 mg/dL    Sodium 135 (L) 136 - " 145 mmol/L    Potassium 3.0 (L) 3.5 - 5.2 mmol/L    Chloride 97 (L) 98 - 107 mmol/L    CO2 26.3 22.0 - 29.0 mmol/L    Calcium 9.2 8.6 - 10.5 mg/dL    Albumin 3.6 3.5 - 5.2 g/dL    Phosphorus 2.3 (L) 2.5 - 4.5 mg/dL    Anion Gap 11.7 5.0 - 15.0 mmol/L    BUN/Creatinine Ratio 18.4 7.0 - 25.0    eGFR 101.3 >60.0 mL/min/1.73   Magnesium    Collection Time: 10/28/23  6:31 AM    Specimen: Blood   Result Value Ref Range    Magnesium 2.2 1.6 - 2.6 mg/dL   CBC Auto Differential    Collection Time: 10/28/23  6:31 AM    Specimen: Blood   Result Value Ref Range    WBC 8.07 3.40 - 10.80 10*3/mm3    RBC 4.69 4.14 - 5.80 10*6/mm3    Hemoglobin 14.7 13.0 - 17.7 g/dL    Hematocrit 41.7 37.5 - 51.0 %    MCV 88.9 79.0 - 97.0 fL    MCH 31.3 26.6 - 33.0 pg    MCHC 35.3 31.5 - 35.7 g/dL    RDW 13.1 12.3 - 15.4 %    RDW-SD 42.3 37.0 - 54.0 fl    MPV 9.7 6.0 - 12.0 fL    Platelets 228 140 - 450 10*3/mm3   Manual Differential    Collection Time: 10/28/23  6:31 AM    Specimen: Blood   Result Value Ref Range    Neutrophil % 72.0 42.7 - 76.0 %    Lymphocyte % 10.0 (L) 19.6 - 45.3 %    Monocyte % 18.0 (H) 5.0 - 12.0 %    Neutrophils Absolute 5.81 1.70 - 7.00 10*3/mm3    Lymphocytes Absolute 0.81 0.70 - 3.10 10*3/mm3    Monocytes Absolute 1.45 (H) 0.10 - 0.90 10*3/mm3    RBC Morphology Normal Normal    WBC Morphology Normal Normal    Platelet Morphology Normal Normal         ASSESSMENT:  MILAGRO - pre-renal, resolved  Nausea vomiting and diarrhea, improving but still not back to normal appetite/oral intake  Salmonella enteritis  Hypokalemia from GI losses  Hypophosphatemia  Metabolic acidosis from renal failure and lactic acidosis, improving  Proteinuria, recheck after hydration  History of hypertension, not on meds  Severe dehydration  Hypovolemic hyponatremia, improved with IV fluids        DISCUSSION/PLAN:   Cr improved to 0.87  Na improved to 135  Change IVF to NS with KCl for 1L  K and Phos replaced additionally as well  Will follow      Elio  Keyur De Dios MD  [facility]   Office phone number: [phone]  Answering service phone number: [phone]    10/28/23  14:06 EDT    Dictation performed using Dragon dictation Snapshot Interactive

## 2023-10-28 NOTE — PROGRESS NOTES
Name: Luisito Burton ADMIT: 10/25/2023   : 1965  PCP: Provider, No Known    MRN: [id number] LOS: 3 days   AGE/SEX: 58 y.o. male  ROOM: [facility]     Subjective   Subjective   Diarrhea persistent but with a lesser frequency.  No fresh bright blood per rectum or melena..  No abdominal colic.  No nausea or vomiting.  Improving appetite and tolerating p.o. diet well.  No fever or chills.  Feels stronger.  Ambulating.    Review of Systems  .  With urine output but dark urine without dysuria or hematuria.  Cardio vascular/respiratory.  No chest pain/no shortness of breath/positive occasional palpitations/no cough  CNS.  No dizziness.  No loss of consciousness.       Objective   Objective   Vital Signs  Temp:  [98.1 °F (36.7 °C)-98.8 °F (37.1 °C)] 98.1 °F (36.7 °C)  Heart Rate:  [101-110] 101  Resp:  [18-20] 18  BP: (114-123)/(81-86) 119/86  SpO2:  [94 %-97 %] 95 %  on   ;   Device (Oxygen Therapy): room air    Intake/Output Summary (Last 24 hours) at 10/28/2023 0959  Last data filed at 10/28/2023 0845  Gross per 24 hour   Intake 700 ml   Output 300 ml   Net 400 ml     Body mass index is 28.29 kg/m².      10/25/23  1304   Weight: 77.1 kg (170 lb)     Physical Exam  General.  Middle-aged gentleman.  Alert and oriented x3.  No apparent pain/distress/diaphoresis.  Normal mood and affect.  Eyes.  Pupils equal round and reactive.  Intact extraocular musculature.  No pallor or jaundice.  Oral cavity.  Moist mucous membrane.  Neck.  Supple.  No JVD.  No lymphadenopathy or thyromegaly.  Cardiovascular.  Regular rate and rhythm.  No gallops or murmurs   chest.  Clear to auscultation bilaterally with no added sounds.    Abdomen.  Soft lax.    Normal bowel sounds.  No tenderness.  No guarding or rebound.  Extremities.  No clubbing/cyanosis/edema.    CNS.  No acute focal neurological deficits.        Results Review:      Results from last 7 days   Lab Units 10/28/23  0631 10/27/23  0539 10/26/23  1506 10/26/23  0552  "10/25/23  1318   SODIUM mmol/L 135* 132*  --  133* 128*   POTASSIUM mmol/L 3.0* 3.5 3.2* 3.1* 3.4*   CHLORIDE mmol/L 97* 98  --  96* 95*   CO2 mmol/L 26.3 23.0  --  21.8* 15.5*   BUN mg/dL 16 30*  --  55* 56*   CREATININE mg/dL 0.87 1.15  --  2.81* 5.23*   GLUCOSE mg/dL 98 109*  --  114* 160*   CALCIUM mg/dL 9.2 9.2  --  8.9 9.7   AST (SGOT) U/L  --   --   --  18 17   ALT (SGPT) U/L  --   --   --  28 31     Estimated Creatinine Clearance: 90.8 mL/min (by C-G formula based on SCr of 0.87 mg/dL).  Results from last 7 days   Lab Units 10/26/23  0552   HEMOGLOBIN A1C % 6.00*         Results from last 7 days   Lab Units 10/26/23  0552   CK TOTAL U/L 163         Results from last 7 days   Lab Units 10/26/23  0552   TSH uIU/mL 2.100     Results from last 7 days   Lab Units 10/28/23  0631 10/27/23  0539 10/26/23  0552   MAGNESIUM mg/dL 2.2 2.0 1.8   PHOSPHORUS mg/dL 2.3* 1.6* 4.4           Invalid input(s): \"LDLCALC\"  Results from last 7 days   Lab Units 10/28/23  0631 10/27/23  0539 10/26/23  0552 10/25/23  1318   WBC 10*3/mm3 8.07 7.45 7.34 8.39   HEMOGLOBIN g/dL 14.7 15.7 17.0 18.4*   HEMATOCRIT % 41.7 45.8 48.8 53.7*   PLATELETS 10*3/mm3 228 226 297 360   MCV fL 88.9 90.2 89.4 89.2   MCH pg 31.3 30.9 31.1 30.6   MCHC g/dL 35.3 34.3 34.8 34.3   RDW % 13.1 13.0 13.3 12.9   RDW-SD fl 42.3 42.9 43.4 43.3   MPV fL 9.7 10.5 10.2 9.9   NEUTROPHIL % %  --  74.4 76.7* 75.3   LYMPHOCYTE % %  --  10.1* 7.6* 10.3*   MONOCYTES % %  --  14.2* 15.0* 13.9*   EOSINOPHIL % %  --  0.3 0.0* 0.0*   BASOPHIL % %  --  0.5 0.3 0.1   IMM GRAN % %  --  0.5  --  0.4   NEUTROS ABS 10*3/mm3 5.81 5.54 5.63 6.32   LYMPHS ABS 10*3/mm3  --  0.75 0.56* 0.86   MONOS ABS 10*3/mm3  --  1.06* 1.10* 1.17*   EOS ABS 10*3/mm3  --  0.02 0.00 0.00   BASOS ABS 10*3/mm3  --  0.04 0.02 0.01   IMMATURE GRANS (ABS) 10*3/mm3  --  0.04  --  0.03   NRBC /100 WBC  --  0.0  --   --              Results from last 7 days   Lab Units 10/27/23  1849 10/25/23  0817 " 10/25/23  1441   LACTATE mmol/L 1.5 2.0 2.7*         Results from last 7 days   Lab Units 10/25/23  1318   LIPASE U/L 32     Results from last 7 days   Lab Units 10/26/23  2112   BLOODCX  Gram Negative Bacilli*  Gram Negative Bacilli*   BCIDPCR  Salmonella spp. Identification by BCID2 PCR*     Results from last 7 days   Lab Units 10/25/23  2007   ADENOVIRUS  Not Detected     Results from last 7 days   Lab Units 10/26/23  0430   NITRITE UA  Negative   WBC UA /HPF None Seen   BACTERIA UA /HPF Trace*   SQUAM EPITHEL UA /HPF 0-2     Results from last 7 days   Lab Units 10/27/23  0539 10/26/23  0430   SODIUM UR mmol/L  --  20   CREATININE UR mg/dL  --  234.8   CHLORIDE UR mmol/L  --  <20   OSMOLALITY UR mOsm/kg  --  736   PROTEIN TOTAL URINE mg/dL  --  78.5   URIC ACID mg/dL 10.3*  --        Imaging:  Imaging Results (Last 24 Hours)       ** No results found for the last 24 hours. **               I reviewed the patient's new clinical results / labs / tests / procedures      Assessment/Plan     Active Hospital Problems    Diagnosis  POA    **ARF (acute renal failure) [N17.9]  Yes    Gram-negative bacteremia [R78.81]  Yes    Nausea and vomiting [R11.2]  Yes    E coli enteritis [A04.4]  Yes    Dehydration [E86.0]  Yes    Diarrhea [R19.7]  Yes      Resolved Hospital Problems   No resolved problems to display.           Salmonella enteritis leading to nausea and vomiting and diarrhea complicated by Salmonella bacteremia.  Benign GI examination.  Resolved nausea and vomiting.  Improved diarrhea.  Will continue IV fluids/IV Zofran/p.o. Colesytramine/IV Rocephin.    Blood cultures repeated today...  Normal liver function test.  ID recommends 14 days of p.o. Bactrim after the last negative blood culture.  Acute kidney failure/hypokalemia/hypomagnesemia/metabolic acidosis mostly secondary to prerenal azotemia leading to hypovolemia and subsequent acute tubular necrosis.  Resolved acute kidney failure with IV fluid.  Good urine  output.  Continue to substitute hypokalemia and hypomagnesemia.  Appreciate nephrology feedback.   Acidosis resolved.  .  Magnesium is normal.  Will continue IV fluid in view of persistent diarrhea to avoid dehydration.    Prediabetes.  Diet at discharge.  VTE prophylaxis.  Subcu heparin    Discussed my findings and plan of treatment with the patient/wife.   Disposition.  Hopefully discharge in 2 days if repeat blood cultures are negative on 14 days of p.o. Bactrim    Corie West MD  [facility]  10/28/23  09:59 EDT

## 2023-10-28 NOTE — PLAN OF CARE
Goal Outcome Evaluation:  Plan of Care Reviewed With: patient        Progress: improving     VSS. Pt up ad alicia. Still having loose stool/diarrhea but pt states it is getting better. Wife at BS. No c/o pain. LR at 100 ml/hr. Pt started on IV rocephin today d/t pos. Blood cx. Possible DC Sunday.

## 2023-10-29 ENCOUNTER — APPOINTMENT (OUTPATIENT)
Dept: GENERAL RADIOLOGY | Facility: HOSPITAL | Age: 58
DRG: 372 | End: 2023-10-29
Payer: COMMERCIAL

## 2023-10-29 LAB
ALBUMIN SERPL-MCNC: 3.5 G/DL (ref 3.5–5.2)
ANION GAP SERPL CALCULATED.3IONS-SCNC: 11 MMOL/L (ref 5–15)
BASOPHILS # BLD AUTO: 0.06 10*3/MM3 (ref 0–0.2)
BASOPHILS NFR BLD AUTO: 0.6 % (ref 0–1.5)
BUN SERPL-MCNC: 14 MG/DL (ref 6–20)
BUN/CREAT SERPL: 15.9 (ref 7–25)
CALCIUM SPEC-SCNC: 9.1 MG/DL (ref 8.6–10.5)
CHLORIDE SERPL-SCNC: 101 MMOL/L (ref 98–107)
CO2 SERPL-SCNC: 24 MMOL/L (ref 22–29)
CREAT SERPL-MCNC: 0.88 MG/DL (ref 0.76–1.27)
DEPRECATED RDW RBC AUTO: 42.6 FL (ref 37–54)
EGFRCR SERPLBLD CKD-EPI 2021: 100.9 ML/MIN/1.73
EOSINOPHIL # BLD AUTO: 0.06 10*3/MM3 (ref 0–0.4)
EOSINOPHIL NFR BLD AUTO: 0.6 % (ref 0.3–6.2)
ERYTHROCYTE [DISTWIDTH] IN BLOOD BY AUTOMATED COUNT: 13.1 % (ref 12.3–15.4)
GLUCOSE SERPL-MCNC: 100 MG/DL (ref 65–99)
HCT VFR BLD AUTO: 40 % (ref 37.5–51)
HGB BLD-MCNC: 14.1 G/DL (ref 13–17.7)
IMM GRANULOCYTES # BLD AUTO: 0.11 10*3/MM3 (ref 0–0.05)
IMM GRANULOCYTES NFR BLD AUTO: 1.1 % (ref 0–0.5)
LYMPHOCYTES # BLD AUTO: 1.21 10*3/MM3 (ref 0.7–3.1)
LYMPHOCYTES NFR BLD AUTO: 12.4 % (ref 19.6–45.3)
MAGNESIUM SERPL-MCNC: 2.1 MG/DL (ref 1.6–2.6)
MCH RBC QN AUTO: 31.4 PG (ref 26.6–33)
MCHC RBC AUTO-ENTMCNC: 35.3 G/DL (ref 31.5–35.7)
MCV RBC AUTO: 89.1 FL (ref 79–97)
MONOCYTES # BLD AUTO: 1.39 10*3/MM3 (ref 0.1–0.9)
MONOCYTES NFR BLD AUTO: 14.2 % (ref 5–12)
NEUTROPHILS NFR BLD AUTO: 6.93 10*3/MM3 (ref 1.7–7)
NEUTROPHILS NFR BLD AUTO: 71.1 % (ref 42.7–76)
NRBC BLD AUTO-RTO: 0 /100 WBC (ref 0–0.2)
PHOSPHATE SERPL-MCNC: 2.2 MG/DL (ref 2.5–4.5)
PLATELET # BLD AUTO: 241 10*3/MM3 (ref 140–450)
PMV BLD AUTO: 10.1 FL (ref 6–12)
POTASSIUM SERPL-SCNC: 3.5 MMOL/L (ref 3.5–5.2)
RBC # BLD AUTO: 4.49 10*6/MM3 (ref 4.14–5.8)
SODIUM SERPL-SCNC: 136 MMOL/L (ref 136–145)
WBC NRBC COR # BLD: 9.76 10*3/MM3 (ref 3.4–10.8)

## 2023-10-29 PROCEDURE — 25010000002 HEPARIN (PORCINE) PER 1000 UNITS: Performed by: INTERNAL MEDICINE

## 2023-10-29 PROCEDURE — 83735 ASSAY OF MAGNESIUM: CPT | Performed by: INTERNAL MEDICINE

## 2023-10-29 PROCEDURE — 25010000002 CEFTRIAXONE PER 250 MG: Performed by: INTERNAL MEDICINE

## 2023-10-29 PROCEDURE — 74018 RADEX ABDOMEN 1 VIEW: CPT

## 2023-10-29 PROCEDURE — 85025 COMPLETE CBC W/AUTO DIFF WBC: CPT | Performed by: INTERNAL MEDICINE

## 2023-10-29 PROCEDURE — 80069 RENAL FUNCTION PANEL: CPT | Performed by: INTERNAL MEDICINE

## 2023-10-29 RX ORDER — CHOLESTYRAMINE 4 G/9G
2 POWDER, FOR SUSPENSION ORAL EVERY 8 HOURS SCHEDULED
Status: DISCONTINUED | OUTPATIENT
Start: 2023-10-29 | End: 2023-11-02

## 2023-10-29 RX ORDER — LOPERAMIDE HYDROCHLORIDE 2 MG/1
4 CAPSULE ORAL 4 TIMES DAILY PRN
Status: DISCONTINUED | OUTPATIENT
Start: 2023-10-29 | End: 2023-10-31

## 2023-10-29 RX ORDER — PANTOPRAZOLE SODIUM 40 MG/10ML
40 INJECTION, POWDER, LYOPHILIZED, FOR SOLUTION INTRAVENOUS
Status: DISCONTINUED | OUTPATIENT
Start: 2023-10-29 | End: 2023-10-31 | Stop reason: CLARIF

## 2023-10-29 RX ADMIN — HEPARIN SODIUM 5000 UNITS: 5000 INJECTION INTRAVENOUS; SUBCUTANEOUS at 06:13

## 2023-10-29 RX ADMIN — Medication 2 PACKET: at 11:21

## 2023-10-29 RX ADMIN — PANTOPRAZOLE SODIUM 40 MG: 40 INJECTION, POWDER, FOR SOLUTION INTRAVENOUS at 06:13

## 2023-10-29 RX ADMIN — CHOLESTYRAMINE 1 PACKET: 4 POWDER, FOR SUSPENSION ORAL at 02:15

## 2023-10-29 RX ADMIN — CEFTRIAXONE 2000 MG: 2 INJECTION, POWDER, FOR SOLUTION INTRAMUSCULAR; INTRAVENOUS at 14:28

## 2023-10-29 RX ADMIN — LOPERAMIDE HYDROCHLORIDE 4 MG: 2 CAPSULE ORAL at 23:18

## 2023-10-29 RX ADMIN — HEPARIN SODIUM 5000 UNITS: 5000 INJECTION INTRAVENOUS; SUBCUTANEOUS at 14:28

## 2023-10-29 RX ADMIN — Medication 2 PACKET: at 17:13

## 2023-10-29 RX ADMIN — CHOLESTYRAMINE 1 PACKET: 4 POWDER, FOR SUSPENSION ORAL at 12:25

## 2023-10-29 RX ADMIN — LOPERAMIDE HYDROCHLORIDE 4 MG: 2 CAPSULE ORAL at 14:28

## 2023-10-29 RX ADMIN — CHOLESTYRAMINE 2 PACKET: 4 POWDER, FOR SUSPENSION ORAL at 18:54

## 2023-10-29 RX ADMIN — HEPARIN SODIUM 5000 UNITS: 5000 INJECTION INTRAVENOUS; SUBCUTANEOUS at 23:17

## 2023-10-29 NOTE — PROGRESS NOTES
"RENAL/KCC:     LOS: 4 days     Chief Complaint/ Reason for encounter: Acute renal failure with acidosis and electrolyte abnormalities    Subjective   Chart reviewed  Still with diarrhea  Otherwise feeling well    Vital Signs  Temp:  [98.1 °F (36.7 °C)-98.6 °F (37 °C)] 98.1 °F (36.7 °C)  Heart Rate:  [103-106] 103  Resp:  [20] 20  BP: (110-129)/(81-88) 116/83       Wt Readings from Last 1 Encounters:   10/25/23 1304 77.1 kg (170 lb)       Objective:  Vital signs: (most recent): Blood pressure 116/83, pulse 103, temperature 98.1 °F (36.7 °C), temperature source Oral, resp. rate 20, height 165.1 cm (65\"), weight 77.1 kg (170 lb), SpO2 97%.                Objective:  General Appearance:  Comfortable, well-appearing, in no acute distress and not in pain.  Awake, alert, oriented  HEENT: Mucous membranes moist, no injury, oropharynx clear  Lungs:  Normal effort and normal respiratory rate.  Breath sounds clear to auscultation.  No  respiratory distress.  No rales, decreased breath sounds or rhonchi.    Heart: Normal rate.  Regular rhythm.  S1, S2 normal.  No murmur.   Abdomen: Abdomen is soft.  Bowel sounds are normal, no abdominal tenderness.  There is no rebound or guarding  Extremities: Trace edema of bilateral lower extremities  Neurological: No focal motor or sensory deficits, pupils reactive  Skin:  Warm and dry.  No rash or cyanosis.       Results Review:    Intake/Output:     Intake/Output Summary (Last 24 hours) at 10/29/2023 1040  Last data filed at 10/29/2023 0854  Gross per 24 hour   Intake 720 ml   Output --   Net 720 ml       Labs:   Recent Results (from the past 24 hour(s))   Renal Function Panel    Collection Time: 10/29/23  7:15 AM    Specimen: Blood   Result Value Ref Range    Glucose 100 (H) 65 - 99 mg/dL    BUN 14 6 - 20 mg/dL    Creatinine 0.88 0.76 - 1.27 mg/dL    Sodium 136 136 - 145 mmol/L    Potassium 3.5 3.5 - 5.2 mmol/L    Chloride 101 98 - 107 mmol/L    CO2 24.0 22.0 - 29.0 mmol/L    Calcium 9.1 " 8.6 - 10.5 mg/dL    Albumin 3.5 3.5 - 5.2 g/dL    Phosphorus 2.2 (L) 2.5 - 4.5 mg/dL    Anion Gap 11.0 5.0 - 15.0 mmol/L    BUN/Creatinine Ratio 15.9 7.0 - 25.0    eGFR 100.9 >60.0 mL/min/1.73   Magnesium    Collection Time: 10/29/23  7:15 AM    Specimen: Blood   Result Value Ref Range    Magnesium 2.1 1.6 - 2.6 mg/dL   CBC Auto Differential    Collection Time: 10/29/23  7:15 AM    Specimen: Blood   Result Value Ref Range    WBC 9.76 3.40 - 10.80 10*3/mm3    RBC 4.49 4.14 - 5.80 10*6/mm3    Hemoglobin 14.1 13.0 - 17.7 g/dL    Hematocrit 40.0 37.5 - 51.0 %    MCV 89.1 79.0 - 97.0 fL    MCH 31.4 26.6 - 33.0 pg    MCHC 35.3 31.5 - 35.7 g/dL    RDW 13.1 12.3 - 15.4 %    RDW-SD 42.6 37.0 - 54.0 fl    MPV 10.1 6.0 - 12.0 fL    Platelets 241 140 - 450 10*3/mm3    Neutrophil % 71.1 42.7 - 76.0 %    Lymphocyte % 12.4 (L) 19.6 - 45.3 %    Monocyte % 14.2 (H) 5.0 - 12.0 %    Eosinophil % 0.6 0.3 - 6.2 %    Basophil % 0.6 0.0 - 1.5 %    Immature Grans % 1.1 (H) 0.0 - 0.5 %    Neutrophils, Absolute 6.93 1.70 - 7.00 10*3/mm3    Lymphocytes, Absolute 1.21 0.70 - 3.10 10*3/mm3    Monocytes, Absolute 1.39 (H) 0.10 - 0.90 10*3/mm3    Eosinophils, Absolute 0.06 0.00 - 0.40 10*3/mm3    Basophils, Absolute 0.06 0.00 - 0.20 10*3/mm3    Immature Grans, Absolute 0.11 (H) 0.00 - 0.05 10*3/mm3    nRBC 0.0 0.0 - 0.2 /100 WBC         ASSESSMENT:  MILAGRO - pre-renal, resolved  Nausea vomiting and diarrhea, improving but still not back to normal appetite/oral intake  Salmonella enteritis  Hypokalemia from GI losses  Hypophosphatemia  Metabolic acidosis from renal failure and lactic acidosis, improving  Proteinuria, recheck after hydration  History of hypertension, not on meds  Severe dehydration  Hypovolemic hyponatremia, improved with IV fluids        DISCUSSION/PLAN:   Cr stable at 0.8  Na improved to 136  Replace K and Phos  Monitor off IVF  ABX per ID  Will follow      Elio De Dios MD  [facility]   Office phone number:  [phone]  Answering service phone number: [phone]    10/29/23  10:40 EDT

## 2023-10-29 NOTE — PROGRESS NOTES
Name: Luisito Burton ADMIT: 10/25/2023   : 1965  PCP: Provider, No Known    MRN: [id number] LOS: 4 days   AGE/SEX: 58 y.o. male  ROOM: [facility]     Subjective   Subjective   Continues with diarrhea.  Has approximately 20 bowel movements over the last 24 hours.  No fresh bright blood per rectum or melena..  Positive abdominal colic today with the bowel movements.  No nausea or vomiting.  Improving appetite and tolerating p.o. diet well.  No fever or chills.      Review of Systems  .  With urine output but dark urine without dysuria or hematuria.  Cardio vascular/respiratory.  No chest pain/no shortness of breath/positive occasional palpitations/no cough  CNS.  No dizziness.  No loss of consciousness.       Objective   Objective   Vital Signs  Temp:  [98.1 °F (36.7 °C)-98.6 °F (37 °C)] 98.1 °F (36.7 °C)  Heart Rate:  [103-106] 103  Resp:  [20] 20  BP: (110-129)/(81-88) 116/83  SpO2:  [92 %-97 %] 97 %  on   ;   Device (Oxygen Therapy): room air    Intake/Output Summary (Last 24 hours) at 10/29/2023 1312  Last data filed at 10/29/2023 0930  Gross per 24 hour   Intake 960 ml   Output --   Net 960 ml     Body mass index is 28.29 kg/m².      10/25/23  1304   Weight: 77.1 kg (170 lb)     Physical Exam  General.  Middle-aged gentleman.  Alert and oriented x3.  No apparent pain/distress/diaphoresis.  Normal mood and affect.  Eyes.  Pupils equal round and reactive.  Intact extraocular musculature.  No pallor or jaundice.  Oral cavity.  Moist mucous membrane.  Neck.  Supple.  No JVD.  No lymphadenopathy or thyromegaly.  Cardiovascular.  Regular rate and rhythm.  No gallops or murmurs   chest.  Clear to auscultation bilaterally with no added sounds.    Abdomen.  Soft lax.    Normal bowel sounds.  No tenderness.  No guarding or rebound.  Extremities.  No clubbing/cyanosis/edema.    CNS.  No acute focal neurological deficits.        Results Review:      Results from last 7 days   Lab Units 10/29/23  0713  "10/28/23  0631 10/27/23  0539 10/26/23  1506 10/26/23  0552 10/25/23  1318   SODIUM mmol/L 136 135* 132*  --  133* 128*   POTASSIUM mmol/L 3.5 3.0* 3.5 3.2* 3.1* 3.4*   CHLORIDE mmol/L 101 97* 98  --  96* 95*   CO2 mmol/L 24.0 26.3 23.0  --  21.8* 15.5*   BUN mg/dL 14 16 30*  --  55* 56*   CREATININE mg/dL 0.88 0.87 1.15  --  2.81* 5.23*   GLUCOSE mg/dL 100* 98 109*  --  114* 160*   CALCIUM mg/dL 9.1 9.2 9.2  --  8.9 9.7   AST (SGOT) U/L  --   --   --   --  18 17   ALT (SGPT) U/L  --   --   --   --  28 31     Estimated Creatinine Clearance: 89.8 mL/min (by C-G formula based on SCr of 0.88 mg/dL).  Results from last 7 days   Lab Units 10/26/23  0552   HEMOGLOBIN A1C % 6.00*         Results from last 7 days   Lab Units 10/26/23  0552   CK TOTAL U/L 163         Results from last 7 days   Lab Units 10/26/23  0552   TSH uIU/mL 2.100     Results from last 7 days   Lab Units 10/29/23  0715 10/28/23  0631 10/27/23  0539 10/26/23  0552   MAGNESIUM mg/dL 2.1 2.2 2.0 1.8   PHOSPHORUS mg/dL 2.2* 2.3* 1.6* 4.4           Invalid input(s): \"LDLCALC\"  Results from last 7 days   Lab Units 10/29/23  0715 10/28/23  0631 10/27/23  0539 10/26/23  0552 10/25/23  1318 10/25/23  1318   WBC 10*3/mm3 9.76 8.07 7.45 7.34  --  8.39   HEMOGLOBIN g/dL 14.1 14.7 15.7 17.0  --  18.4*   HEMATOCRIT % 40.0 41.7 45.8 48.8  --  53.7*   PLATELETS 10*3/mm3 241 228 226 297  --  360   MCV fL 89.1 88.9 90.2 89.4  --  89.2   MCH pg 31.4 31.3 30.9 31.1  --  30.6   MCHC g/dL 35.3 35.3 34.3 34.8  --  34.3   RDW % 13.1 13.1 13.0 13.3  --  12.9   RDW-SD fl 42.6 42.3 42.9 43.4  --  43.3   MPV fL 10.1 9.7 10.5 10.2  --  9.9   NEUTROPHIL % % 71.1  --  74.4 76.7*  --  75.3   LYMPHOCYTE % % 12.4*  --  10.1* 7.6*  --  10.3*   MONOCYTES % % 14.2*  --  14.2* 15.0*  --  13.9*   EOSINOPHIL % % 0.6  --  0.3 0.0*  --  0.0*   BASOPHIL % % 0.6  --  0.5 0.3  --  0.1   IMM GRAN % % 1.1*  --  0.5  --   --  0.4   NEUTROS ABS 10*3/mm3 6.93 5.81 5.54 5.63  --  6.32   LYMPHS ABS " 10*3/mm3 1.21  --  0.75 0.56*  --  0.86   MONOS ABS 10*3/mm3 1.39*  --  1.06* 1.10*  --  1.17*   EOS ABS 10*3/mm3 0.06  --  0.02 0.00  --  0.00   BASOS ABS 10*3/mm3 0.06  --  0.04 0.02  --  0.01   IMMATURE GRANS (ABS) 10*3/mm3 0.11*  --  0.04  --   --  0.03   NRBC /100 WBC 0.0  --  0.0  --    < >  --     < > = values in this interval not displayed.             Results from last 7 days   Lab Units 10/27/23  1659 10/25/23  1825 10/25/23  1441   LACTATE mmol/L 1.5 2.0 2.7*         Results from last 7 days   Lab Units 10/25/23  1318   LIPASE U/L 32     Results from last 7 days   Lab Units 10/28/23  0723 10/28/23  0716 10/26/23  2112   BLOODCX  No growth at 24 hours No growth at 24 hours Gram Negative Bacilli*  Gram Negative Bacilli*   BCIDPCR   --   --  Salmonella spp. Identification by BCID2 PCR*     Results from last 7 days   Lab Units 10/25/23  2007   ADENOVIRUS  Not Detected     Results from last 7 days   Lab Units 10/26/23  0430   NITRITE UA  Negative   WBC UA /HPF None Seen   BACTERIA UA /HPF Trace*   SQUAM EPITHEL UA /HPF 0-2     Results from last 7 days   Lab Units 10/27/23  0539 10/26/23  0430   SODIUM UR mmol/L  --  20   CREATININE UR mg/dL  --  234.8   CHLORIDE UR mmol/L  --  <20   OSMOLALITY UR mOsm/kg  --  736   PROTEIN TOTAL URINE mg/dL  --  78.5   URIC ACID mg/dL 10.3*  --        Imaging:  Imaging Results (Last 24 Hours)       ** No results found for the last 24 hours. **               I reviewed the patient's new clinical results / labs / tests / procedures      Assessment/Plan     Active Hospital Problems    Diagnosis  POA    **ARF (acute renal failure) [N17.9]  Yes    Gram-negative bacteremia [R78.81]  Yes    Nausea and vomiting [R11.2]  Yes    E coli enteritis [A04.4]  Yes    Dehydration [E86.0]  Yes    Diarrhea [R19.7]  Yes      Resolved Hospital Problems   No resolved problems to display.           Salmonella enteritis leading to nausea and vomiting and diarrhea complicated by Salmonella  bacteremia.  Benign GI examination.  Resolved nausea and vomiting.  Continues with significant diarrhea.  We will continue IV Rocephin /IV Zofran/p.o. Colesytramine (increased the dose)/will add Imodium as needed to Colesytramine.   Repeat blood cultures are negative till now...  Normal liver function test.  ID recommends 14 days of p.o. Bactrim after the last negative blood culture.  Acute kidney failure/hypokalemia/hypomagnesemia/metabolic acidosis mostly secondary to prerenal azotemia leading to hypovolemia and subsequent acute tubular necrosis.  Resolved acute kidney failure with IV fluid.  Good urine output.  Resolved hypokalemia/hypomagnesemia with substitution.  Continue to substitute hypophosphatemia.  Appreciate nephrology feedback.   Acidosis resolved.  IV fluid DC'd by nephrology.  Prediabetes.  Diet at discharge.  VTE prophylaxis.  Subcu heparin    Discussed my findings and plan of treatment with the patient/wife.   Disposition.  Hopefully discharge in 1-2 days if repeat blood cultures continues to be negative diarrhea improves on 14 days of p.o. Bactrim    Corie West MD  [facility]  10/29/23  13:12 EDT

## 2023-10-29 NOTE — PLAN OF CARE
Goal Outcome Evaluation:  Plan of Care Reviewed With: patient        Progress: improving     VSS. Pt up ad alicia to BR. Pt states diarrhea a little worse since beginning antbx. Pt on IV rocephin. Pt got 1 L of NS with k+ and is now S/L. No c/o pain. Wife remains at BS. Plan is for DC home today or tomorrow on bactrim PO.

## 2023-10-30 PROBLEM — R78.81 SALMONELLA BACTEREMIA: Status: ACTIVE | Noted: 2023-10-30

## 2023-10-30 PROBLEM — A02.0 SALMONELLA GASTROENTERITIS: Status: ACTIVE | Noted: 2023-10-30

## 2023-10-30 LAB
ALBUMIN SERPL-MCNC: 3.7 G/DL (ref 3.5–5.2)
ANION GAP SERPL CALCULATED.3IONS-SCNC: 11 MMOL/L (ref 5–15)
BACTERIA SPEC AEROBE CULT: ABNORMAL
BASOPHILS # BLD AUTO: 0.08 10*3/MM3 (ref 0–0.2)
BASOPHILS NFR BLD AUTO: 0.6 % (ref 0–1.5)
BUN SERPL-MCNC: 18 MG/DL (ref 6–20)
BUN/CREAT SERPL: 15.4 (ref 7–25)
CALCIUM SPEC-SCNC: 9.6 MG/DL (ref 8.6–10.5)
CHLORIDE SERPL-SCNC: 96 MMOL/L (ref 98–107)
CO2 SERPL-SCNC: 24 MMOL/L (ref 22–29)
CREAT SERPL-MCNC: 1.17 MG/DL (ref 0.76–1.27)
DEPRECATED RDW RBC AUTO: 43.6 FL (ref 37–54)
EGFRCR SERPLBLD CKD-EPI 2021: 73.2 ML/MIN/1.73
EOSINOPHIL # BLD AUTO: 0.08 10*3/MM3 (ref 0–0.4)
EOSINOPHIL NFR BLD AUTO: 0.6 % (ref 0.3–6.2)
ERYTHROCYTE [DISTWIDTH] IN BLOOD BY AUTOMATED COUNT: 13.2 % (ref 12.3–15.4)
GLUCOSE SERPL-MCNC: 99 MG/DL (ref 65–99)
GRAM STN SPEC: ABNORMAL
HCT VFR BLD AUTO: 42.4 % (ref 37.5–51)
HGB BLD-MCNC: 14.8 G/DL (ref 13–17.7)
IMM GRANULOCYTES # BLD AUTO: 0.14 10*3/MM3 (ref 0–0.05)
IMM GRANULOCYTES NFR BLD AUTO: 1.1 % (ref 0–0.5)
ISOLATED FROM: ABNORMAL
ISOLATED FROM: ABNORMAL
LYMPHOCYTES # BLD AUTO: 1.59 10*3/MM3 (ref 0.7–3.1)
LYMPHOCYTES NFR BLD AUTO: 12.4 % (ref 19.6–45.3)
MAGNESIUM SERPL-MCNC: 2.2 MG/DL (ref 1.6–2.6)
MCH RBC QN AUTO: 31.3 PG (ref 26.6–33)
MCHC RBC AUTO-ENTMCNC: 34.9 G/DL (ref 31.5–35.7)
MCV RBC AUTO: 89.6 FL (ref 79–97)
MONOCYTES # BLD AUTO: 1.55 10*3/MM3 (ref 0.1–0.9)
MONOCYTES NFR BLD AUTO: 12.1 % (ref 5–12)
NEUTROPHILS NFR BLD AUTO: 73.2 % (ref 42.7–76)
NEUTROPHILS NFR BLD AUTO: 9.4 10*3/MM3 (ref 1.7–7)
NRBC BLD AUTO-RTO: 0 /100 WBC (ref 0–0.2)
PHOSPHATE SERPL-MCNC: 3.3 MG/DL (ref 2.5–4.5)
PLATELET # BLD AUTO: 295 10*3/MM3 (ref 140–450)
PMV BLD AUTO: 9.9 FL (ref 6–12)
POTASSIUM SERPL-SCNC: 3.6 MMOL/L (ref 3.5–5.2)
RBC # BLD AUTO: 4.73 10*6/MM3 (ref 4.14–5.8)
SODIUM SERPL-SCNC: 131 MMOL/L (ref 136–145)
WBC NRBC COR # BLD: 12.84 10*3/MM3 (ref 3.4–10.8)

## 2023-10-30 PROCEDURE — 25010000002 CEFTRIAXONE PER 250 MG: Performed by: INTERNAL MEDICINE

## 2023-10-30 PROCEDURE — 80069 RENAL FUNCTION PANEL: CPT | Performed by: INTERNAL MEDICINE

## 2023-10-30 PROCEDURE — 85025 COMPLETE CBC W/AUTO DIFF WBC: CPT | Performed by: INTERNAL MEDICINE

## 2023-10-30 PROCEDURE — 83735 ASSAY OF MAGNESIUM: CPT | Performed by: INTERNAL MEDICINE

## 2023-10-30 PROCEDURE — 25010000002 HEPARIN (PORCINE) PER 1000 UNITS: Performed by: INTERNAL MEDICINE

## 2023-10-30 PROCEDURE — 25810000003 SODIUM CHLORIDE 0.9 % SOLUTION: Performed by: STUDENT IN AN ORGANIZED HEALTH CARE EDUCATION/TRAINING PROGRAM

## 2023-10-30 RX ORDER — POTASSIUM CHLORIDE 750 MG/1
40 TABLET, FILM COATED, EXTENDED RELEASE ORAL ONCE
Status: COMPLETED | OUTPATIENT
Start: 2023-10-30 | End: 2023-10-30

## 2023-10-30 RX ORDER — SODIUM CHLORIDE 9 MG/ML
75 INJECTION, SOLUTION INTRAVENOUS CONTINUOUS
Status: DISCONTINUED | OUTPATIENT
Start: 2023-10-30 | End: 2023-11-03

## 2023-10-30 RX ADMIN — SODIUM CHLORIDE 100 ML/HR: 9 INJECTION, SOLUTION INTRAVENOUS at 08:50

## 2023-10-30 RX ADMIN — Medication 2 PACKET: at 08:49

## 2023-10-30 RX ADMIN — CEFTRIAXONE 2000 MG: 2 INJECTION, POWDER, FOR SOLUTION INTRAMUSCULAR; INTRAVENOUS at 13:48

## 2023-10-30 RX ADMIN — Medication 10 ML: at 20:24

## 2023-10-30 RX ADMIN — CHOLESTYRAMINE 2 PACKET: 4 POWDER, FOR SUSPENSION ORAL at 11:15

## 2023-10-30 RX ADMIN — HEPARIN SODIUM 5000 UNITS: 5000 INJECTION INTRAVENOUS; SUBCUTANEOUS at 06:15

## 2023-10-30 RX ADMIN — CHOLESTYRAMINE 2 PACKET: 4 POWDER, FOR SUSPENSION ORAL at 18:32

## 2023-10-30 RX ADMIN — PANTOPRAZOLE SODIUM 40 MG: 40 INJECTION, POWDER, FOR SOLUTION INTRAVENOUS at 06:15

## 2023-10-30 RX ADMIN — CHOLESTYRAMINE 2 PACKET: 4 POWDER, FOR SUSPENSION ORAL at 04:02

## 2023-10-30 RX ADMIN — POTASSIUM CHLORIDE 40 MEQ: 750 TABLET, EXTENDED RELEASE ORAL at 11:15

## 2023-10-30 RX ADMIN — HEPARIN SODIUM 5000 UNITS: 5000 INJECTION INTRAVENOUS; SUBCUTANEOUS at 13:48

## 2023-10-30 RX ADMIN — LOPERAMIDE HYDROCHLORIDE 4 MG: 2 CAPSULE ORAL at 22:32

## 2023-10-30 RX ADMIN — LOPERAMIDE HYDROCHLORIDE 4 MG: 2 CAPSULE ORAL at 06:15

## 2023-10-30 RX ADMIN — HEPARIN SODIUM 5000 UNITS: 5000 INJECTION INTRAVENOUS; SUBCUTANEOUS at 22:32

## 2023-10-30 RX ADMIN — Medication 10 ML: at 08:50

## 2023-10-30 RX ADMIN — SODIUM CHLORIDE 100 ML/HR: 9 INJECTION, SOLUTION INTRAVENOUS at 20:23

## 2023-10-30 NOTE — PROGRESS NOTES
"  Infectious Diseases Progress Note    Robin Donovan MD     [facility]  [place]: 4 days  Patient Identification:  Name: Luisito Burton  Age: 58 y.o.  Sex: male  :  1965  MRN: [id number]         Primary Care Physician: Provider, No Known        Subjective: Continues to feel well denies any fever and chills.  Appetite is improving.  Does not feel weak or dizzy anymore.  Tolerating antibiotics without any problem.  Able to read information about Levaquin ciprofloxacin and Bactrim and agrees to take antibiotic therapy based on the sensitivity of Salmonella.  Interval History: See consultation note.    Objective:    Scheduled Meds:cefTRIAXone, 2,000 mg, Intravenous, Q24H  cholestyramine, 2 packet, Oral, Q8H  heparin (porcine), 5,000 Units, Subcutaneous, Q8H  pantoprazole, 40 mg, Intravenous, Q AM  potassium & sodium phosphates, 2 packet, Oral, TID AC  sodium chloride, 10 mL, Intravenous, Q12H      Continuous Infusions:       Vital signs in last 24 hours:  Temp:  [98.1 °F (36.7 °C)-98.6 °F (37 °C)] 98.4 °F (36.9 °C)  Heart Rate:  [103] 103  Resp:  [20] 20  BP: (110-126)/(81-83) 110/82    Intake/Output:    Intake/Output Summary (Last 24 hours) at 10/29/2023 2158  Last data filed at 10/29/2023 1818  Gross per 24 hour   Intake 600 ml   Output --   Net 600 ml       Exam:  /82 (BP Location: Left arm, Patient Position: Lying)   Pulse 103   Temp 98.4 °F (36.9 °C) (Oral)   Resp 20   Ht 165.1 cm (65\")   Wt 77.1 kg (170 lb)   SpO2 100%   BMI 28.29 kg/m²   Patient is examined using the personal protective equipment as per guidelines from infection control for this particular patient as enacted.  Hand washing was performed before and after patient interaction.  General Appearance:    Alert, cooperative, no distress, AAOx3                          Head:    Normocephalic, without obvious abnormality, atraumatic                           Eyes:    PERRL, conjunctivae/corneas clear, EOM's intact, both " eyes                         Throat:   Lips, tongue, gums normal; oral mucosa pink and moist                           Neck:   Supple, symmetrical, trachea midline, no JVD                         Lungs:    Clear to auscultation bilaterally, respirations unlabored                 Chest Wall:    No tenderness or deformity                          Heart:  S1-S2 regular                  Abdomen:   Soft nontender                 Extremities:   Extremities normal, atraumatic, no cyanosis or edema                        Pulses:   Pulses palpable in all extremities                            Skin:   Skin is warm and dry,  no rashes or palpable lesions                  Neurologic: Alert and oriented and grossly nonfocal    Data Review:    I reviewed the patient's new clinical results.  Results from last 7 days   Lab Units 10/29/23  0715 10/28/23  0631 10/27/23  0539 10/26/23  0552 10/25/23  1318   WBC 10*3/mm3 9.76 8.07 7.45 7.34 8.39   HEMOGLOBIN g/dL 14.1 14.7 15.7 17.0 18.4*   PLATELETS 10*3/mm3 241 228 226 297 360     Results from last 7 days   Lab Units 10/29/23  0715 10/28/23  0631 10/27/23  0539 10/26/23  1506 10/26/23  0552 10/25/23  1318   SODIUM mmol/L 136 135* 132*  --  133* 128*   POTASSIUM mmol/L 3.5 3.0* 3.5 3.2* 3.1* 3.4*   CHLORIDE mmol/L 101 97* 98  --  96* 95*   CO2 mmol/L 24.0 26.3 23.0  --  21.8* 15.5*   BUN mg/dL 14 16 30*  --  55* 56*   CREATININE mg/dL 0.88 0.87 1.15  --  2.81* 5.23*   CALCIUM mg/dL 9.1 9.2 9.2  --  8.9 9.7   GLUCOSE mg/dL 100* 98 109*  --  114* 160*     Microbiology Results (last 10 days)       Procedure Component Value - Date/Time    Blood Culture - Blood, Hand, Left [[id number]]  (Normal) Collected: 10/28/23 0723    Lab Status: Preliminary result Specimen: Blood from Hand, Left Updated: 10/29/23 0745     Blood Culture No growth at 24 hours    Blood Culture - Blood, Hand, Right [[id number]]  (Normal) Collected: 10/28/23 0716    Lab Status: Preliminary result Specimen: Blood from Hand,  Right Updated: 10/29/23 0745     Blood Culture No growth at 24 hours    Blood Culture - Blood, Arm, Right [[id number]]  (Abnormal) Collected: 10/26/23 2112    Lab Status: Preliminary result Specimen: Blood from Arm, Right Updated: 10/28/23 0610     Blood Culture Gram Negative Bacilli     Isolated from Aerobic and Anaerobic Bottles     Gram Stain Anaerobic Bottle Gram negative bacilli      Aerobic Bottle Gram negative bacilli    Blood Culture - Blood, Arm, Right [[id number]]  (Abnormal) Collected: 10/26/23 2112    Lab Status: Preliminary result Specimen: Blood from Arm, Right Updated: 10/28/23 0610     Blood Culture Gram Negative Bacilli     Isolated from Aerobic and Anaerobic Bottles     Gram Stain Aerobic Bottle Gram negative bacilli      Anaerobic Bottle Gram negative bacilli    Blood Culture ID, PCR - Blood, Arm, Right [[id number]]  (Abnormal) Collected: 10/26/23 2112    Lab Status: Final result Specimen: Blood from Arm, Right Updated: 10/27/23 1406     BCID, PCR Salmonella spp. Identification by BCID2 PCR     BOTTLE TYPE Anaerobic Bottle    Narrative:      No resistance genes detected.    Gastrointestinal Panel, PCR - Stool, Per Rectum [[id number]]  (Abnormal) Collected: 10/25/23 2007    Lab Status: Final result Specimen: Stool from Per Rectum Updated: 10/25/23 2214     Campylobacter Not Detected     Plesiomonas shigelloides Not Detected     Salmonella Detected     Vibrio Not Detected     Vibrio cholerae Not Detected     Yersinia enterocolitica Not Detected     Enteroaggregative E. coli (EAEC) Not Detected     Enteropathogenic E. coli (EPEC) Not Detected     Enterotoxigenic E. coli (ETEC) lt/st Not Detected     Shiga-like toxin-producing E. coli (STEC) stx1/stx2 Not Detected     Shigella/Enteroinvasive E. coli (EIEC) Not Detected     Cryptosporidium Not Detected     Cyclospora cayetanensis Not Detected     Entamoeba histolytica Not Detected     Giardia lamblia Not Detected     Adenovirus F40/41 Not Detected      Astrovirus Not Detected     Norovirus GI/GII Not Detected     Rotavirus A Not Detected     Sapovirus (I, II, IV or V) Not Detected    Stool Culture, Targeted - Stool, Per Rectum [[id number]] Collected: 10/25/23 2007    Lab Status: Preliminary result Specimen: Stool from Per Rectum Updated: 10/27/23 0957     Stool Culture Culture in progress              Assessment:    ARF (acute renal failure)    Diarrhea    Nausea and vomiting    E coli enteritis    Dehydration    Gram-negative bacteremia  1-bacteremic Salmonella gastroenteritis of nontypeable type likely due to contaminated/infected farm eggs stored on room temperature for few days prior to consumption 3 to 4 days prior to onset of the symptoms-overall significant improvement with supportive care  2-severe metabolic compromise with dehydration and acute renal failure due to severe bacteremic gastroenteritis overall improved with supportive care  3-other diagnoses per primary team.        Recommendations/Discussions:  See my discussions and recommendations on 10/27/2023.  Requested the nurse to provide him with information about Bactrim Levaquin and ciprofloxacin so that he can be well versed about potential side effects of these medications.  Would recommend 2 weeks of antibiotic treatment after he reviews the side effects and based on the sensitivity data of the Salmonella and the blood cultures.  Follow-up on repeat blood culture results.  Continue with.  Review systems identify areas of secondary seeding.  Continue supportive care.  Robin Donovan MD  10/29/2023  21:58 EDT    Parts of this note may be an electronic transcription/translation of spoken language to printed text using the Dragon dictation system.

## 2023-10-30 NOTE — PLAN OF CARE
Problem: Adult Inpatient Plan of Care  Goal: Plan of Care Review  Outcome: Ongoing, Progressing  Goal: Patient-Specific Goal (Individualized)  Outcome: Ongoing, Progressing  Goal: Absence of Hospital-Acquired Illness or Injury  Outcome: Ongoing, Progressing  Intervention: Identify and Manage Fall Risk  Recent Flowsheet Documentation  Taken 10/30/2023 1415 by Lynn Canela RN  Safety Promotion/Fall Prevention: safety round/check completed  Taken 10/30/2023 1200 by Lynn Canela RN  Safety Promotion/Fall Prevention:   clutter free environment maintained   safety round/check completed  Taken 10/30/2023 1000 by Lynn Canela RN  Safety Promotion/Fall Prevention:   assistive device/personal items within reach   clutter free environment maintained   gait belt   mobility aid in reach   lighting adjusted   muscle strengthening facilitated   nonskid shoes/slippers when out of bed   room organization consistent   safety round/check completed  Intervention: Prevent Skin Injury  Recent Flowsheet Documentation  Taken 10/30/2023 1000 by Lynn Canela RN  Body Position:   position changed independently   dangle, side of bed  Intervention: Prevent and Manage VTE (Venous Thromboembolism) Risk  Recent Flowsheet Documentation  Taken 10/30/2023 1000 by Lynn Canela RN  Activity Management: up ad alicia  Intervention: Prevent Infection  Recent Flowsheet Documentation  Taken 10/30/2023 1415 by Lynn Canela RN  Infection Prevention:   rest/sleep promoted   hand hygiene promoted  Taken 10/30/2023 1200 by Lynn Canela RN  Infection Prevention:   hand hygiene promoted   rest/sleep promoted  Taken 10/30/2023 1000 by Lynn Canela RN  Infection Prevention: hand hygiene promoted  Goal: Optimal Comfort and Wellbeing  Outcome: Ongoing, Progressing  Goal: Readiness for Transition of Care  Outcome: Ongoing, Progressing     Problem: Pain Acute  Goal: Acceptable Pain Control and Functional Ability  Outcome: Ongoing, Progressing      Problem: Adjustment to Illness (Sepsis/Septic Shock)  Goal: Optimal Coping  Outcome: Ongoing, Progressing     Problem: Bleeding (Sepsis/Septic Shock)  Goal: Absence of Bleeding  Outcome: Ongoing, Progressing     Problem: Glycemic Control Impaired (Sepsis/Septic Shock)  Goal: Blood Glucose Level Within Desired Range  Outcome: Ongoing, Progressing     Problem: Infection Progression (Sepsis/Septic Shock)  Goal: Absence of Infection Signs and Symptoms  Outcome: Ongoing, Progressing  Intervention: Initiate Sepsis Management  Recent Flowsheet Documentation  Taken 10/30/2023 1415 by Lynn Canela, RN  Infection Prevention:   rest/sleep promoted   hand hygiene promoted  Taken 10/30/2023 1200 by Lynn Canela, RN  Infection Prevention:   hand hygiene promoted   rest/sleep promoted  Taken 10/30/2023 1000 by Lynn Canela, RN  Infection Prevention: hand hygiene promoted  Intervention: Promote Recovery  Recent Flowsheet Documentation  Taken 10/30/2023 1000 by Lnyn Canela, RN  Activity Management: up ad alicia     Problem: Nutrition Impaired (Sepsis/Septic Shock)  Goal: Optimal Nutrition Intake  Outcome: Ongoing, Progressing   Goal Outcome Evaluation:patient verbalizes understanding of plan of care and goals

## 2023-10-30 NOTE — PROGRESS NOTES
"   LOS: 5 days     Chief Complaint/ Reason for encounter: Acute renal failure with acidosis and electrolyte abnormalities    Subjective   10/27/23 : He feels substantially better today, eating and drinking a lot better but still not back to his usual baseline  No fevers or chills, decreased nausea, decreased abdominal pain and vomiting, decreased diarrhea  Tmax 99.9, making more urine but it is still dark    10/30: Still having high frequency, large volume diarrhea  No fevers or chills, still some crampy abdominal pain  Some nausea, no shortness of breath or chest pain reported    Medical history reviewed:  History of Present Illness    Subjective    History taken from: Patient and chart    Vital Signs  Temp:  [97.9 °F (36.6 °C)-98.4 °F (36.9 °C)] 97.9 °F (36.6 °C)  Heart Rate:  [102-113] 102  Resp:  [16-20] 16  BP: (105-110)/(82-85) 105/84       Wt Readings from Last 1 Encounters:   10/25/23 1304 77.1 kg (170 lb)       Objective:  Vital signs: (most recent): Blood pressure 105/84, pulse 102, temperature 97.9 °F (36.6 °C), temperature source Oral, resp. rate 16, height 165.1 cm (65\"), weight 77.1 kg (170 lb), SpO2 95%.                Objective:  General Appearance:  Comfortable, well-appearing, in no acute distress and not in pain.  Awake, alert, oriented  HEENT: Mucous membranes moist, no injury, oropharynx clear  Lungs:  Normal effort and normal respiratory rate.  Breath sounds clear to auscultation.  No  respiratory distress.  No rales, decreased breath sounds or rhonchi.    Heart: Normal rate.  Regular rhythm.  S1, S2 normal.  No murmur.   Abdomen: Abdomen is soft.  Bowel sounds are normal, no abdominal tenderness.  There is no rebound or guarding  Extremities: Trace edema of bilateral lower extremities  Neurological: No focal motor or sensory deficits, pupils reactive  Skin:  Warm and dry.  No rash or cyanosis.       Results Review:    Intake/Output:     Intake/Output Summary (Last 24 hours) at 10/30/2023 " 1043  Last data filed at 10/30/2023 0605  Gross per 24 hour   Intake 710 ml   Output 2 ml   Net 708 ml         DATA:  Radiology and Labs:  The following labs independently reviewed by me. Additional labs ordered for tomorrow a.m.  Interval notes, chart personally reviewed by me.   Old records independently reviewed showing normal baseline renal function    Discussed with patient and family at bedside    Risk/ complexity of medical care/ medical decision making high risk, Salmonella enteric colitis with renal failure    Labs:   Recent Results (from the past 24 hour(s))   Renal Function Panel    Collection Time: 10/30/23  5:56 AM    Specimen: Blood   Result Value Ref Range    Glucose 99 65 - 99 mg/dL    BUN 18 6 - 20 mg/dL    Creatinine 1.17 0.76 - 1.27 mg/dL    Sodium 131 (L) 136 - 145 mmol/L    Potassium 3.6 3.5 - 5.2 mmol/L    Chloride 96 (L) 98 - 107 mmol/L    CO2 24.0 22.0 - 29.0 mmol/L    Calcium 9.6 8.6 - 10.5 mg/dL    Albumin 3.7 3.5 - 5.2 g/dL    Phosphorus 3.3 2.5 - 4.5 mg/dL    Anion Gap 11.0 5.0 - 15.0 mmol/L    BUN/Creatinine Ratio 15.4 7.0 - 25.0    eGFR 73.2 >60.0 mL/min/1.73   Magnesium    Collection Time: 10/30/23  5:56 AM    Specimen: Blood   Result Value Ref Range    Magnesium 2.2 1.6 - 2.6 mg/dL   CBC Auto Differential    Collection Time: 10/30/23  5:56 AM    Specimen: Blood   Result Value Ref Range    WBC 12.84 (H) 3.40 - 10.80 10*3/mm3    RBC 4.73 4.14 - 5.80 10*6/mm3    Hemoglobin 14.8 13.0 - 17.7 g/dL    Hematocrit 42.4 37.5 - 51.0 %    MCV 89.6 79.0 - 97.0 fL    MCH 31.3 26.6 - 33.0 pg    MCHC 34.9 31.5 - 35.7 g/dL    RDW 13.2 12.3 - 15.4 %    RDW-SD 43.6 37.0 - 54.0 fl    MPV 9.9 6.0 - 12.0 fL    Platelets 295 140 - 450 10*3/mm3    Neutrophil % 73.2 42.7 - 76.0 %    Lymphocyte % 12.4 (L) 19.6 - 45.3 %    Monocyte % 12.1 (H) 5.0 - 12.0 %    Eosinophil % 0.6 0.3 - 6.2 %    Basophil % 0.6 0.0 - 1.5 %    Immature Grans % 1.1 (H) 0.0 - 0.5 %    Neutrophils, Absolute 9.40 (H) 1.70 - 7.00 10*3/mm3     Lymphocytes, Absolute 1.59 0.70 - 3.10 10*3/mm3    Monocytes, Absolute 1.55 (H) 0.10 - 0.90 10*3/mm3    Eosinophils, Absolute 0.08 0.00 - 0.40 10*3/mm3    Basophils, Absolute 0.08 0.00 - 0.20 10*3/mm3    Immature Grans, Absolute 0.14 (H) 0.00 - 0.05 10*3/mm3    nRBC 0.0 0.0 - 0.2 /100 WBC       Radiology:  Pertinent radiology studies were reviewed as described above      Medications have been reviewed separately in chart overview      ASSESSMENT:  severe renal failure.  Prerenal, markedly improved.  IV fluids were stopped over the weekend but renal function is slightly worse today  New hyponatremia, hypovolemic  Nausea vomiting and diarrhea, still with profuse, frequent diarrhea  Salmonella enteritis with sepsis  Hypokalemia from GI losses, improved but still low  New hypophosphatemia  Metabolic acidosis from renal failure and lactic acidosis, improving  Proteinuria, recheck after hydration  History of hypertension, not on meds  Severe dehydration  Hypovolemic hyponatremia, improved with IV fluids  Proteinuria, minimal on quantification       DISCUSSION/PLAN:   Renal function had improved to baseline over the weekend but he continues to have frequent, profuse diarrhea  IV fluids have been restarted.  Agree with this.  I recommend continuing IV fluids until his diarrhea lessens  We will give oral potassium replacement  Recheck sodium in the morning after normal saline hydration  IV antibiotics per infectious disease  Acidosis improved     Continue to monitor volume and electrolytes closely       Please send me a secure chat message if any questions regarding patient care.    Hao Allen MD  [facility]   Office phone number: [phone]  Answering service phone number: [phone]    10/30/23  10:43 EDT    Dictation performed using Dragon dictation software

## 2023-10-30 NOTE — PROGRESS NOTES
Name: Luisito Burton ADMIT: 10/25/2023   : 1965  PCP: Provider, No Known    MRN: [id number] LOS: 5 days   AGE/SEX: 58 y.o. male  ROOM: [facility]     Subjective   Subjective   No new events overnight, reports abdominal cramping improved, but continues to have significant diarrhea, 10-15 BM since yesterday, no blood.  No nausea no vomiting.  Tolerating p.o. diet.    Review of Systems   As above  Objective   Objective   Vital Signs  Temp:  [97.9 °F (36.6 °C)-98.4 °F (36.9 °C)] 97.9 °F (36.6 °C)  Heart Rate:  [102-113] 102  Resp:  [16-20] 16  BP: (105-107)/(83-85) 105/84  SpO2:  [95 %-100 %] 95 %  on   ;   Device (Oxygen Therapy): room air  Body mass index is 28.29 kg/m².  Physical Exam    General: Alert and oriented x3, no acute distress  HEENT: Normocephalic, atraumatic  CV: Regular rate and rhythm, no murmurs rubs or gallops  Lungs: Clear to auscultation bilaterally, no crackles or wheezes  Abdomen: Soft, nontender, nondistended  Extremities: No significant peripheral edema , no cyanosis     Results Review     I reviewed the patient's new clinical results.  Results from last 7 days   Lab Units 10/30/23  0556 10/29/23  0715 10/28/23  0631 10/27/23  0539   WBC 10*3/mm3 12.84* 9.76 8.07 7.45   HEMOGLOBIN g/dL 14.8 14.1 14.7 15.7   PLATELETS 10*3/mm3 295 241 228 226     Results from last 7 days   Lab Units 10/30/23  0556 10/29/23  0715 10/28/23  0631 10/27/23  0539   SODIUM mmol/L 131* 136 135* 132*   POTASSIUM mmol/L 3.6 3.5 3.0* 3.5   CHLORIDE mmol/L 96* 101 97* 98   CO2 mmol/L 24.0 24.0 26.3 23.0   BUN mg/dL 18 14 16 30*   CREATININE mg/dL 1.17 0.88 0.87 1.15   GLUCOSE mg/dL 99 100* 98 109*   Estimated Creatinine Clearance: 67.5 mL/min (by C-G formula based on SCr of 1.17 mg/dL).  Results from last 7 days   Lab Units 10/30/23  0556 10/29/23  0715 10/28/23  0631 10/27/23  0539 10/26/23  0552 10/25/23  1318   ALBUMIN g/dL 3.7 3.5 3.6 3.5 4.3 4.8   BILIRUBIN mg/dL  --   --   --   --  0.9 0.8   ALK PHOS U/L  --   " --   --   --  58 75   AST (SGOT) U/L  --   --   --   --  18 17   ALT (SGPT) U/L  --   --   --   --  28 31     Results from last 7 days   Lab Units 10/30/23  0556 10/29/23  0715 10/28/23  0631 10/27/23  0539   CALCIUM mg/dL 9.6 9.1 9.2 9.2   ALBUMIN g/dL 3.7 3.5 3.6 3.5   MAGNESIUM mg/dL 2.2 2.1 2.2 2.0   PHOSPHORUS mg/dL 3.3 2.2* 2.3* 1.6*     Results from last 7 days   Lab Units 10/27/23  1659 10/25/23  1825 10/25/23  1441   LACTATE mmol/L 1.5 2.0 2.7*     SARS-CoV-2, TAVARES   Date Value Ref Range Status   12/07/2022 DETECTED (A) NOT DETECTED Final     Comment:       A Detected result is considered a positive test result  for COVID-19.  This indicates that RNA from SARS-CoV-2  (formerly 2019-nCoV) was detected, and the patient is  infected with the virus and presumed to be contagious.  If requested by public health authority, specimen will  be sent for additional testing.    Please review the \"Fact Sheets\" and FDA authorized  labeling available for health care providers and  patients using the following websites:  [email or web address]/home/Covid-19/HCP/[employer]/flu-fact-sheet.html  [employer]/home/Covid-19/Patients/Quest[employer]/flu-fact-  sheet.html      Please note: If a not detected (negative) Influenza A or  Influenza B is obtained, this means viral RNA was not  present in the specimen above the limit of detection. A  negative result does not rule out the possibility of  influenza and should not be used as the sole basis for  treatment or patient management decisions.  If Influenza  is still suspected, based on exposure history together  with other clinical findings, re-testing should be  considered.    This test has been authorized by the FDA under  an Emergency Use Authorization (EUA) for use by authorized  laboratories.      Due to the current public health emergency, [facility] is receiving a high volume of samples from  a wide variety of swabs and media for COVID-19 testing.  In order to serve " "patients during this public health  crisis, samples from appropriate clinical sources are   being tested. Negative test results derived from  specimens received in non-commercially manufactured  viral collection and transport media, or in media and  sample collection kits not yet authorized by FDA for  COVID-19 testing should be cautiously evaluated and the  patient potentially subjected to extra precautions such  as additional clinical monitoring, including collection  of an additional specimen.    Methodology:  Nucleic Acid Amplification Test (NAAT)  includes RT-PCR or TMA      Additional information about COVID-19 can be found  at the [employer] website:  [email or web address].    For patients with a Detected or Inconclusive test  result, please see CDC's COVID-19 Treatments and   Medications page located at   https://www.cdc.gov/coronavirus/2019-ncov/your-health/treatments-for-  severe-illness.html for information on COVID-19 therapeutics.    For patients with a Not Detected test result, please see CDC's  Vaccines for COVID-19 page located at  https://www.cdc.gov/coronavirus/2019-ncov/vaccines/index.html  for information on COVID-19 vaccines.     No results found for: \"HGBA1C\", \"POCGLU\"        XR Abdomen KUB  XR ABDOMEN KUB-     HISTORY: 58-year-old male with abdominal pain and diarrhea.     FINDINGS: There is a paucity of formed stool within the colon. There is  no evidence for bowel obstruction.     This report was finalized on 10/29/2023 5:39 PM by Dr. Bre Dowling M.D  on Workstation: [id number]       Scheduled Medications  cefTRIAXone, 2,000 mg, Intravenous, Q24H  cholestyramine, 2 packet, Oral, Q8H  heparin (porcine), 5,000 Units, Subcutaneous, Q8H  pantoprazole, 40 mg, Intravenous, Q AM  sodium chloride, 10 mL, Intravenous, Q12H    Infusions  sodium chloride, 100 mL/hr, Last Rate: 100 mL/hr (10/30/23 0850)    Diet  Diet: Gastrointestinal Diets; Lactose-Controlled; Texture: Regular " Texture (IDDSI 7); Fluid Consistency: Thin (IDDSI 0)    I have personally reviewed     [x]  Laboratory   [x]  Microbiology   []  Radiology   []  EKG/Telemetry  []  Cardiology/Vascular   []  Pathology    []  Records       Assessment/Plan     Active Hospital Problems    Diagnosis  POA    **ARF (acute renal failure) [N17.9]  Yes    Salmonella gastroenteritis [A02.0]  Yes    Salmonella bacteremia [R78.81]  Unknown    Nausea and vomiting [R11.2]  Yes    Dehydration [E86.0]  Yes    Diarrhea [R19.7]  Yes      Resolved Hospital Problems   No resolved problems to display.       Salmonella bacteremia secondary to salmonella enteritis   Resolved nausea and vomiting.    Continues with significant diarrhea.    Continue cholestyramine, as needed Imodium   Repeat blood cultures negative to date  Resume IV fluids as patient continues to have significant diarrhea  Continue IV ceftriaxone, ID managing    Acute kidney failure/hypokalemia/hypomagnesemia/metabolic acidosis mostly secondary to prerenal azotemia leading to hypovolemia and subsequent acute tubular necrosis.    Renal function slightly up today, resumed IV fluids.  Nephrology following      Hyponatremia: Sodium trending down back to 131, prerenal secondary to diarrhea.   IV fluids resumed. Monitor daily.      Prediabetes.  Diet at discharge.        Lovenox  for DVT prophylaxis.  Full code.  Discussed with patient and spouse.  Discussed in multidisciplinary rounds with nursing staff, CCP, pharmacy  Anticipate discharge home, likely in 1-2 days if diarrhea improves and cleared by consultants.      Copied text in this note has been reviewed and is accurate as of 10/30/23.         Dictated utilizing Dragon dictation        Karina Antonio MD  [facility]  10/30/23  13:53 EDT

## 2023-10-31 LAB
ALBUMIN SERPL-MCNC: 3.4 G/DL (ref 3.5–5.2)
ANION GAP SERPL CALCULATED.3IONS-SCNC: 8 MMOL/L (ref 5–15)
BASOPHILS # BLD AUTO: 0.06 10*3/MM3 (ref 0–0.2)
BASOPHILS NFR BLD AUTO: 0.4 % (ref 0–1.5)
BUN SERPL-MCNC: 17 MG/DL (ref 6–20)
BUN/CREAT SERPL: 16 (ref 7–25)
C DIFF TOX GENS STL QL NAA+PROBE: NEGATIVE
CALCIUM SPEC-SCNC: 8.7 MG/DL (ref 8.6–10.5)
CHLORIDE SERPL-SCNC: 104 MMOL/L (ref 98–107)
CO2 SERPL-SCNC: 22 MMOL/L (ref 22–29)
CREAT SERPL-MCNC: 1.06 MG/DL (ref 0.76–1.27)
DEPRECATED RDW RBC AUTO: 40.7 FL (ref 37–54)
EGFRCR SERPLBLD CKD-EPI 2021: 82.4 ML/MIN/1.73
EOSINOPHIL # BLD AUTO: 0.1 10*3/MM3 (ref 0–0.4)
EOSINOPHIL NFR BLD AUTO: 0.6 % (ref 0.3–6.2)
ERYTHROCYTE [DISTWIDTH] IN BLOOD BY AUTOMATED COUNT: 13 % (ref 12.3–15.4)
GLUCOSE SERPL-MCNC: 98 MG/DL (ref 65–99)
HCT VFR BLD AUTO: 39.8 % (ref 37.5–51)
HGB BLD-MCNC: 14 G/DL (ref 13–17.7)
IMM GRANULOCYTES # BLD AUTO: 0.11 10*3/MM3 (ref 0–0.05)
IMM GRANULOCYTES NFR BLD AUTO: 0.7 % (ref 0–0.5)
LYMPHOCYTES # BLD AUTO: 1.48 10*3/MM3 (ref 0.7–3.1)
LYMPHOCYTES NFR BLD AUTO: 9.6 % (ref 19.6–45.3)
MAGNESIUM SERPL-MCNC: 2.1 MG/DL (ref 1.6–2.6)
MCH RBC QN AUTO: 30.9 PG (ref 26.6–33)
MCHC RBC AUTO-ENTMCNC: 35.2 G/DL (ref 31.5–35.7)
MCV RBC AUTO: 87.9 FL (ref 79–97)
MONOCYTES # BLD AUTO: 1.17 10*3/MM3 (ref 0.1–0.9)
MONOCYTES NFR BLD AUTO: 7.6 % (ref 5–12)
NEUTROPHILS NFR BLD AUTO: 12.56 10*3/MM3 (ref 1.7–7)
NEUTROPHILS NFR BLD AUTO: 81.1 % (ref 42.7–76)
NRBC BLD AUTO-RTO: 0 /100 WBC (ref 0–0.2)
PHOSPHATE SERPL-MCNC: 2.4 MG/DL (ref 2.5–4.5)
PLATELET # BLD AUTO: 299 10*3/MM3 (ref 140–450)
PMV BLD AUTO: 9.8 FL (ref 6–12)
POTASSIUM SERPL-SCNC: 3.8 MMOL/L (ref 3.5–5.2)
RBC # BLD AUTO: 4.53 10*6/MM3 (ref 4.14–5.8)
SODIUM SERPL-SCNC: 134 MMOL/L (ref 136–145)
WBC NRBC COR # BLD: 15.48 10*3/MM3 (ref 3.4–10.8)

## 2023-10-31 PROCEDURE — 25010000002 CEFTRIAXONE PER 250 MG: Performed by: INTERNAL MEDICINE

## 2023-10-31 PROCEDURE — 87493 C DIFF AMPLIFIED PROBE: CPT | Performed by: STUDENT IN AN ORGANIZED HEALTH CARE EDUCATION/TRAINING PROGRAM

## 2023-10-31 PROCEDURE — 25810000003 SODIUM CHLORIDE 0.9 % SOLUTION: Performed by: STUDENT IN AN ORGANIZED HEALTH CARE EDUCATION/TRAINING PROGRAM

## 2023-10-31 PROCEDURE — 83735 ASSAY OF MAGNESIUM: CPT | Performed by: INTERNAL MEDICINE

## 2023-10-31 PROCEDURE — 99222 1ST HOSP IP/OBS MODERATE 55: CPT | Performed by: INTERNAL MEDICINE

## 2023-10-31 PROCEDURE — 25010000002 HEPARIN (PORCINE) PER 1000 UNITS: Performed by: INTERNAL MEDICINE

## 2023-10-31 PROCEDURE — 80069 RENAL FUNCTION PANEL: CPT | Performed by: INTERNAL MEDICINE

## 2023-10-31 PROCEDURE — 85025 COMPLETE CBC W/AUTO DIFF WBC: CPT | Performed by: INTERNAL MEDICINE

## 2023-10-31 RX ORDER — PANTOPRAZOLE SODIUM 40 MG/1
40 TABLET, DELAYED RELEASE ORAL
Status: DISCONTINUED | OUTPATIENT
Start: 2023-10-31 | End: 2023-11-04 | Stop reason: HOSPADM

## 2023-10-31 RX ORDER — LOPERAMIDE HYDROCHLORIDE 2 MG/1
4 CAPSULE ORAL 4 TIMES DAILY
Status: DISCONTINUED | OUTPATIENT
Start: 2023-10-31 | End: 2023-11-01

## 2023-10-31 RX ADMIN — HEPARIN SODIUM 5000 UNITS: 5000 INJECTION INTRAVENOUS; SUBCUTANEOUS at 23:00

## 2023-10-31 RX ADMIN — PANTOPRAZOLE SODIUM 40 MG: 40 INJECTION, POWDER, FOR SOLUTION INTRAVENOUS at 06:37

## 2023-10-31 RX ADMIN — Medication 2 PACKET: at 08:57

## 2023-10-31 RX ADMIN — CHOLESTYRAMINE 2 PACKET: 4 POWDER, FOR SUSPENSION ORAL at 10:41

## 2023-10-31 RX ADMIN — SODIUM CHLORIDE 100 ML/HR: 9 INJECTION, SOLUTION INTRAVENOUS at 06:36

## 2023-10-31 RX ADMIN — HEPARIN SODIUM 5000 UNITS: 5000 INJECTION INTRAVENOUS; SUBCUTANEOUS at 06:36

## 2023-10-31 RX ADMIN — SODIUM CHLORIDE 100 ML/HR: 9 INJECTION, SOLUTION INTRAVENOUS at 16:38

## 2023-10-31 RX ADMIN — CEFTRIAXONE 2000 MG: 2 INJECTION, POWDER, FOR SOLUTION INTRAMUSCULAR; INTRAVENOUS at 13:39

## 2023-10-31 RX ADMIN — LOPERAMIDE HYDROCHLORIDE 4 MG: 2 CAPSULE ORAL at 23:00

## 2023-10-31 RX ADMIN — CHOLESTYRAMINE 2 PACKET: 4 POWDER, FOR SUSPENSION ORAL at 03:19

## 2023-10-31 RX ADMIN — Medication 2 PACKET: at 17:13

## 2023-10-31 RX ADMIN — Medication 10 ML: at 08:49

## 2023-10-31 RX ADMIN — PANTOPRAZOLE SODIUM 40 MG: 40 TABLET, DELAYED RELEASE ORAL at 17:13

## 2023-10-31 RX ADMIN — LOPERAMIDE HYDROCHLORIDE 4 MG: 2 CAPSULE ORAL at 17:13

## 2023-10-31 RX ADMIN — CHOLESTYRAMINE 2 PACKET: 4 POWDER, FOR SUSPENSION ORAL at 19:53

## 2023-10-31 RX ADMIN — HEPARIN SODIUM 5000 UNITS: 5000 INJECTION INTRAVENOUS; SUBCUTANEOUS at 15:03

## 2023-10-31 NOTE — PROGRESS NOTES
"  Infectious Diseases Progress Note    Robin Donovan MD     [facility]  [place]: 6 days  Patient Identification:  Name: Luisito Burton  Age: 58 y.o.  Sex: male  :  1965  MRN: [id number]         Primary Care Physician: Provider, No Known        Subjective: Continues to feel well and appetite is improving still having loose bowel movements denies any fever and chills.  Does not feel worse or any different than yesterday.  Interval History: See consultation note.    Objective:    Scheduled Meds:cefTRIAXone, 2,000 mg, Intravenous, Q24H  cholestyramine, 2 packet, Oral, Q8H  heparin (porcine), 5,000 Units, Subcutaneous, Q8H  pantoprazole, 40 mg, Intravenous, Q AM  potassium & sodium phosphates, 2 packet, Oral, BID AC  sodium chloride, 10 mL, Intravenous, Q12H      Continuous Infusions:sodium chloride, 100 mL/hr, Last Rate: 100 mL/hr (10/31/23 0636)          Vital signs in last 24 hours:  Temp:  [97.3 °F (36.3 °C)-98.2 °F (36.8 °C)] 98.2 °F (36.8 °C)  Heart Rate:  [] 97  Resp:  [16-18] 16  BP: (104-124)/(72-87) 117/73    Intake/Output:    Intake/Output Summary (Last 24 hours) at 10/31/2023 1251  Last data filed at 10/31/2023 0636  Gross per 24 hour   Intake 2416.67 ml   Output --   Net 2416.67 ml       Exam:  /73 (BP Location: Right arm, Patient Position: Lying)   Pulse 97   Temp 98.2 °F (36.8 °C) (Oral)   Resp 16   Ht 165.1 cm (65\")   Wt 77.1 kg (170 lb)   SpO2 97%   BMI 28.29 kg/m²   Patient is examined using the personal protective equipment as per guidelines from infection control for this particular patient as enacted.  Hand washing was performed before and after patient interaction.  General Appearance:    Alert, cooperative, no distress, AAOx3                          Head:    Normocephalic, without obvious abnormality, atraumatic                           Eyes:    PERRL, conjunctivae/corneas clear, EOM's intact, both eyes                         Throat:   Lips, tongue, gums " normal; oral mucosa pink and moist                           Neck:   Supple, symmetrical, trachea midline, no JVD                         Lungs:    Clear to auscultation bilaterally, respirations unlabored                 Chest Wall:    No tenderness or deformity                          Heart:  S1-S2 regular                  Abdomen:   Soft nontender                 Extremities:   Extremities normal, atraumatic, no cyanosis or edema                        Pulses:   Pulses palpable in all extremities                            Skin:   Skin is warm and dry,  no rashes or palpable lesions                  Neurologic: Alert and oriented and grossly nonfocal    Data Review:    I reviewed the patient's new clinical results.  Results from last 7 days   Lab Units 10/31/23  0535 10/30/23  0556 10/29/23  0715 10/28/23  0631 10/27/23  0539 10/26/23  0552 10/25/23  1318   WBC 10*3/mm3 15.48* 12.84* 9.76 8.07 7.45 7.34 8.39   HEMOGLOBIN g/dL 14.0 14.8 14.1 14.7 15.7 17.0 18.4*   PLATELETS 10*3/mm3 299 295 241 228 226 297 360     Results from last 7 days   Lab Units 10/31/23  0535 10/30/23  0556 10/29/23  0715 10/28/23  0631 10/27/23  0539 10/26/23  1506 10/26/23  0552 10/25/23  1318   SODIUM mmol/L 134* 131* 136 135* 132*  --  133* 128*   POTASSIUM mmol/L 3.8 3.6 3.5 3.0* 3.5 3.2* 3.1* 3.4*   CHLORIDE mmol/L 104 96* 101 97* 98  --  96* 95*   CO2 mmol/L 22.0 24.0 24.0 26.3 23.0  --  21.8* 15.5*   BUN mg/dL 17 18 14 16 30*  --  55* 56*   CREATININE mg/dL 1.06 1.17 0.88 0.87 1.15  --  2.81* 5.23*   CALCIUM mg/dL 8.7 9.6 9.1 9.2 9.2  --  8.9 9.7   GLUCOSE mg/dL 98 99 100* 98 109*  --  114* 160*     Microbiology Results (last 10 days)       Procedure Component Value - Date/Time    Clostridioides difficile Toxin - Stool, Per Rectum [[id number]]  (Normal) Collected: 10/31/23 1032    Lab Status: Final result Specimen: Stool from Per Rectum Updated: 10/31/23 1127    Narrative:      The following orders were created for panel order  Clostridioides difficile Toxin - Stool, Per Rectum.  Procedure                               Abnormality         Status                     ---------                               -----------         ------                     Clostridioides difficile...[[id number]]  Normal              Final result                 Please view results for these tests on the individual orders.    Clostridioides difficile Toxin, PCR - Stool, Per Rectum [[id number]]  (Normal) Collected: 10/31/23 1032    Lab Status: Final result Specimen: Stool from Per Rectum Updated: 10/31/23 1127     Toxigenic C. difficile by PCR Negative    Narrative:      The result indicates the absence of toxigenic C. difficile from stool specimen.     Blood Culture - Blood, Hand, Left [[id number]]  (Normal) Collected: 10/28/23 0723    Lab Status: Preliminary result Specimen: Blood from Hand, Left Updated: 10/31/23 0745     Blood Culture No growth at 3 days    Blood Culture - Blood, Hand, Right [[id number]]  (Normal) Collected: 10/28/23 0716    Lab Status: Preliminary result Specimen: Blood from Hand, Right Updated: 10/31/23 0745     Blood Culture No growth at 3 days    Blood Culture - Blood, Arm, Right [[id number]]  (Abnormal)  (Susceptibility) Collected: 10/26/23 2112    Lab Status: Final result Specimen: Blood from Arm, Right Updated: 10/30/23 0924     Blood Culture Salmonella group     Isolated from Aerobic and Anaerobic Bottles     Gram Stain Anaerobic Bottle Gram negative bacilli      Aerobic Bottle Gram negative bacilli    Narrative:      Refer to targeted stool culture collected on 10/25/2023 2007 for [facility] results.    Susceptibility        Salmonella group      KAREL Not Specified      Ampicillin Susceptible       Ceftriaxone Susceptible       Ciprofloxacin  Susceptible      Levofloxacin Intermediate       Trimethoprim + Sulfamethoxazole Susceptible                          Susceptibility Comments       Salmonella group    Cefazolin sensitivity will not  be reported for Enterobacteriaceae in non-urine isolates. If cefazolin is preferred, please call the microbiology lab to request an E-test.  With the exception of urinary-sourced infections, aminoglycosides should not be used as monotherapy.  Ciprofloxacin sensitivity will not be automatically reported for extra-intestinal isolates. Ciprofloxacin performed by E-test   Salmonella and Shigella spp. may appear active in vitro to 1st/2nd generation cephalosporins/cephamycins and aminoglycosides but are not effective clinically.               Blood Culture - Blood, Arm, Right [[id number]]  (Abnormal) Collected: 10/26/23 2112    Lab Status: Final result Specimen: Blood from Arm, Right Updated: 10/30/23 0925     Blood Culture Salmonella group     Isolated from Aerobic and Anaerobic Bottles     Gram Stain Aerobic Bottle Gram negative bacilli      Anaerobic Bottle Gram negative bacilli    Narrative:      Refer to previous blood culture collected on 10/26/2023 2112 for MICs    Refer to previous targeted stool culture collected on 10/25/2023 2007 for [place] [facility] results.      Blood Culture ID, PCR - Blood, Arm, Right [[id number]]  (Abnormal) Collected: 10/26/23 2112    Lab Status: Final result Specimen: Blood from Arm, Right Updated: 10/27/23 1406     BCID, PCR Salmonella spp. Identification by BCID2 PCR     BOTTLE TYPE Anaerobic Bottle    Narrative:      No resistance genes detected.    Gastrointestinal Panel, PCR - Stool, Per Rectum [[id number]]  (Abnormal) Collected: 10/25/23 2007    Lab Status: Final result Specimen: Stool from Per Rectum Updated: 10/25/23 2214     Campylobacter Not Detected     Plesiomonas shigelloides Not Detected     Salmonella Detected     Vibrio Not Detected     Vibrio cholerae Not Detected     Yersinia enterocolitica Not Detected     Enteroaggregative E. coli (EAEC) Not Detected     Enteropathogenic E. coli (EPEC) Not Detected     Enterotoxigenic E. coli (ETEC) lt/st Not Detected     Shiga-like  toxin-producing E. coli (STEC) stx1/stx2 Not Detected     Shigella/Enteroinvasive E. coli (EIEC) Not Detected     Cryptosporidium Not Detected     Cyclospora cayetanensis Not Detected     Entamoeba histolytica Not Detected     Giardia lamblia Not Detected     Adenovirus F40/41 Not Detected     Astrovirus Not Detected     Norovirus GI/GII Not Detected     Rotavirus A Not Detected     Sapovirus (I, II, IV or V) Not Detected    Stool Culture, Targeted - Stool, Per Rectum [[id number]]  (Abnormal) Collected: 10/25/23 2007    Lab Status: Preliminary result Specimen: Stool from Per Rectum Updated: 10/30/23 0922     Stool Culture Salmonella group    Narrative:      Sent to [facility] for confirmation               Assessment:    ARF (acute renal failure)    Diarrhea    Nausea and vomiting    Dehydration    Salmonella gastroenteritis    Salmonella bacteremia  1-bacteremic Salmonella gastroenteritis of nontypeable type likely due to contaminated/infected farm eggs stored on room temperature for few days prior to consumption 3 to 4 days prior to onset of the symptoms-overall significant improvement with supportive care  2-severe metabolic compromise with dehydration and acute renal failure due to severe bacteremic gastroenteritis overall improved with supportive care  3-progressive leukocytosis etiology unclear-stool studies are negative for C. difficile as of 10/31/2023.        Recommendations/Discussions:  See my discussion and recommendation on 10/27 and 10/29/2023.  Continue with IV ceftriaxone  His rising white blood cell count and persistent diarrhea in this situation despite adequate treatment is concerning and raises the possibility of possible evolving C. difficile infection or undrained focus in the setting of Salmonella gastroenteritis with bacteremia.  Check CT scan of the abdomen and pelvis without contrast while providing with supportive care.  Care plan and concept of care discussed with Dr. Antonio as well as with  patient and his wife.  Robin Donovan MD  10/31/2023  12:51 EDT    Parts of this note may be an electronic transcription/translation of spoken language to printed text using the Dragon dictation system.

## 2023-10-31 NOTE — PROGRESS NOTES
"   LOS: 6 days     Chief Complaint/ Reason for encounter: Acute renal failure with acidosis and electrolyte abnormalities    Subjective   10/27/23 : He feels substantially better today, eating and drinking a lot better but still not back to his usual baseline  No fevers or chills, decreased nausea, decreased abdominal pain and vomiting, decreased diarrhea  Tmax 99.9, making more urine but it is still dark    10/30: Still having high frequency, large volume diarrhea  No fevers or chills, still some crampy abdominal pain  Some nausea, no shortness of breath or chest pain reported    10/31: Frequency of diarrhea slightly improved, he states he is having a liquid bowel movement every 1-2 hours, slight increase consistency  No nausea vomiting, fevers or chills  Tolerating full clears well    Medical history reviewed:  History of Present Illness    Subjective    History taken from: Patient and chart    Vital Signs  Temp:  [97.3 °F (36.3 °C)-98.2 °F (36.8 °C)] 98.2 °F (36.8 °C)  Heart Rate:  [] 97  Resp:  [16-18] 16  BP: (104-124)/(72-87) 117/73       Wt Readings from Last 1 Encounters:   10/25/23 1304 77.1 kg (170 lb)       Objective:  Vital signs: (most recent): Blood pressure 117/73, pulse 97, temperature 98.2 °F (36.8 °C), temperature source Oral, resp. rate 16, height 165.1 cm (65\"), weight 77.1 kg (170 lb), SpO2 97%.                Objective:  General Appearance:  Comfortable, well-appearing, in no acute distress and not in pain.  Awake, alert, oriented  HEENT: Mucous membranes moist, no injury, oropharynx clear  Lungs:  Normal effort and normal respiratory rate.  Breath sounds clear to auscultation.  No  respiratory distress.  No rales, decreased breath sounds or rhonchi.    Heart: Normal rate.  Regular rhythm.  S1, S2 normal.  No murmur.   Abdomen: Abdomen is soft.  Bowel sounds are normal, no abdominal tenderness.  There is no rebound or guarding  Extremities: Trace edema of bilateral lower " extremities  Neurological: No focal motor or sensory deficits, pupils reactive  Skin:  Warm and dry.  No rash or cyanosis.       Results Review:    Intake/Output:     Intake/Output Summary (Last 24 hours) at 10/31/2023 1058  Last data filed at 10/31/2023 0636  Gross per 24 hour   Intake 2416.67 ml   Output --   Net 2416.67 ml         DATA:  Radiology and Labs:  The following labs independently reviewed by me. Additional labs ordered for tomorrow a.m.  Interval notes, chart personally reviewed by me.   Old records independently reviewed showing normal baseline renal function    Discussed with patient and family at bedside    Risk/ complexity of medical care/ medical decision making high risk, Salmonella enteric colitis with renal failure    Labs:   Recent Results (from the past 24 hour(s))   Renal Function Panel    Collection Time: 10/31/23  5:35 AM    Specimen: Blood   Result Value Ref Range    Glucose 98 65 - 99 mg/dL    BUN 17 6 - 20 mg/dL    Creatinine 1.06 0.76 - 1.27 mg/dL    Sodium 134 (L) 136 - 145 mmol/L    Potassium 3.8 3.5 - 5.2 mmol/L    Chloride 104 98 - 107 mmol/L    CO2 22.0 22.0 - 29.0 mmol/L    Calcium 8.7 8.6 - 10.5 mg/dL    Albumin 3.4 (L) 3.5 - 5.2 g/dL    Phosphorus 2.4 (L) 2.5 - 4.5 mg/dL    Anion Gap 8.0 5.0 - 15.0 mmol/L    BUN/Creatinine Ratio 16.0 7.0 - 25.0    eGFR 82.4 >60.0 mL/min/1.73   Magnesium    Collection Time: 10/31/23  5:35 AM    Specimen: Blood   Result Value Ref Range    Magnesium 2.1 1.6 - 2.6 mg/dL   CBC Auto Differential    Collection Time: 10/31/23  5:35 AM    Specimen: Blood   Result Value Ref Range    WBC 15.48 (H) 3.40 - 10.80 10*3/mm3    RBC 4.53 4.14 - 5.80 10*6/mm3    Hemoglobin 14.0 13.0 - 17.7 g/dL    Hematocrit 39.8 37.5 - 51.0 %    MCV 87.9 79.0 - 97.0 fL    MCH 30.9 26.6 - 33.0 pg    MCHC 35.2 31.5 - 35.7 g/dL    RDW 13.0 12.3 - 15.4 %    RDW-SD 40.7 37.0 - 54.0 fl    MPV 9.8 6.0 - 12.0 fL    Platelets 299 140 - 450 10*3/mm3    Neutrophil % 81.1 (H) 42.7 - 76.0 %     Lymphocyte % 9.6 (L) 19.6 - 45.3 %    Monocyte % 7.6 5.0 - 12.0 %    Eosinophil % 0.6 0.3 - 6.2 %    Basophil % 0.4 0.0 - 1.5 %    Immature Grans % 0.7 (H) 0.0 - 0.5 %    Neutrophils, Absolute 12.56 (H) 1.70 - 7.00 10*3/mm3    Lymphocytes, Absolute 1.48 0.70 - 3.10 10*3/mm3    Monocytes, Absolute 1.17 (H) 0.10 - 0.90 10*3/mm3    Eosinophils, Absolute 0.10 0.00 - 0.40 10*3/mm3    Basophils, Absolute 0.06 0.00 - 0.20 10*3/mm3    Immature Grans, Absolute 0.11 (H) 0.00 - 0.05 10*3/mm3    nRBC 0.0 0.0 - 0.2 /100 WBC       Radiology:  Pertinent radiology studies were reviewed as described above      Medications have been reviewed separately in chart overview      ASSESSMENT:  severe renal failure.  Prerenal, markedly improved.  Creatinine went up slightly after IV fluids were stopped but better today  New hyponatremia, hypovolemic, improved with IV fluids  Diarrhea secondary to Salmonella, slightly better  Salmonella enteritis with sepsis  Hypokalemia from GI losses, improved after replacement  Hypophosphatemia, replaced orally  Hypoalbuminemia, encourage nutrition as able  Metabolic acidosis from renal failure and lactic acidosis, improving  Proteinuria, recheck after hydration  History of hypertension, not on meds  Severe dehydration  Hypovolemic hyponatremia, improved with IV fluids  Proteinuria, minimal on quantification       DISCUSSION/PLAN:   Renal function better today, near baseline.  Euvolemic  Will continue IV fluids as he continues to have frequent, profuse diarrhea  C. difficile testing is pending  Replace phosphorus orally and monitor potassium  IV antibiotics per infectious disease  Acidosis improved and stable today    Continue to monitor volume and electrolytes closely       Hao Allen MD  [facility]   Office phone number: [phone]  Answering service phone number: [phone]    10/31/23  10:58 EDT    Dictation performed using Dragon dictation software

## 2023-10-31 NOTE — CONSULTS
[facility]  Initial Inpatient Consult Note    Referring Provider: Debbie lOvera    Reason for Consultation: Salmonella gastroenteritis    Subjective     History of present illness:    58 y.o. male developed symptoms 10 days ago of diarrhea and abdominal pain.  Abdominal pain lower quadrants bilaterally does not radiate.  Found to have Salmonella gastroenteritis with bacteremia admitted to the hospital.  Received IV antibiotics per the direction of infectious disease consultant.  His cramping is gone his fevers are gone.  He is still having about 10-15 liquid bowel movements every 24 hours and we are asked to see him per patient request to add any additional input.    He has never had colonoscopy in his lifetime    Past Medical History:  History reviewed. No pertinent past medical history.  Past Surgical History:  Past Surgical History:   Procedure Laterality Date    FACIAL FRACTURE SURGERY      SHOULDER SURGERY        Social History:   Social History     Tobacco Use    Smoking status: Never    Smokeless tobacco: Never   Substance Use Topics    Alcohol use: Yes      Family History:  Family History   Problem Relation Age of Onset    Arthritis Father        Home Meds:  Medications Prior to Admission   Medication Sig Dispense Refill Last Dose    ondansetron (ZOFRAN) 8 MG tablet Take 1 tablet by mouth Every 8 (Eight) Hours As Needed for Nausea or Vomiting.   10/25/2023     Current Meds:   cefTRIAXone, 2,000 mg, Intravenous, Q24H  cholestyramine, 2 packet, Oral, Q8H  heparin (porcine), 5,000 Units, Subcutaneous, Q8H  pantoprazole, 40 mg, Intravenous, Q AM  potassium & sodium phosphates, 2 packet, Oral, BID AC  sodium chloride, 10 mL, Intravenous, Q12H      Allergies:  No Known Allergies  Review of Systems  There is weakness and fatigue all other systems reviewed and negative     Objective     Vital Signs  Temp:  [97.3 °F (36.3 °C)-98.2 °F (36.8 °C)] 98.2 °F (36.8 °C)  Heart Rate:  [] 97  Resp:   [16-18] 16  BP: (104-124)/(72-87) 117/73  Physical Exam:  General Appearance:    Alert, cooperative, in no acute distress   Head:    Normocephalic, without obvious abnormality, atraumatic   Eyes:          conjunctivae and sclerae normal, no   icterus   Throat:   no thrush, oral mucosa moist   Neck:   Supple, no adenopathy   Lungs:     Clear to auscultation bilaterally    Heart:    Regular rhythm and normal rate    Chest Wall:    No abnormalities observed   Abdomen:     Soft, nondistended, nontender; normal bowel sounds   Extremities:   no edema, no redness   Skin:   No bruising or rash   Psychiatric:  normal mood and insight     Results Review:   I reviewed the patient's new clinical results.    Results from last 7 days   Lab Units 10/31/23  0535 10/30/23  0556 10/29/23  0715   WBC 10*3/mm3 15.48* 12.84* 9.76   HEMOGLOBIN g/dL 14.0 14.8 14.1   HEMATOCRIT % 39.8 42.4 40.0   PLATELETS 10*3/mm3 299 295 241     Results from last 7 days   Lab Units 10/31/23  0535 10/30/23  0556 10/29/23  0715 10/26/23  1506 10/26/23  0552 10/25/23  1318   SODIUM mmol/L 134* 131* 136   < > 133* 128*   POTASSIUM mmol/L 3.8 3.6 3.5   < > 3.1* 3.4*   CHLORIDE mmol/L 104 96* 101   < > 96* 95*   CO2 mmol/L 22.0 24.0 24.0   < > 21.8* 15.5*   BUN mg/dL 17 18 14   < > 55* 56*   CREATININE mg/dL 1.06 1.17 0.88   < > 2.81* 5.23*   CALCIUM mg/dL 8.7 9.6 9.1   < > 8.9 9.7   BILIRUBIN mg/dL  --   --   --   --  0.9 0.8   ALK PHOS U/L  --   --   --   --  58 75   ALT (SGPT) U/L  --   --   --   --  28 31   AST (SGOT) U/L  --   --   --   --  18 17   GLUCOSE mg/dL 98 99 100*   < > 114* 160*    < > = values in this interval not displayed.         Lab Results   Lab Value Date/Time    LIPASE 32 10/25/2023 1318       Radiology:  XR Abdomen KUB   Final Result      CT Abdomen Pelvis Without Contrast    (Results Pending)       Assessment & Plan   Active Hospital Problems    Diagnosis     **ARF (acute renal failure)     Salmonella gastroenteritis     Salmonella  bacteremia     Nausea and vomiting     Dehydration     Diarrhea        Assessment:  Diarrhea  Nausea and vomiting resolved  Abdominal pain resolved  Gram-negative bacteremia  Salmonella gastroenteritis    Plan:  Antibiotics per infectious disease consult  I will change Imodium dosage to try to slow down the diarrhea  He will receive an appointment with my nurse practitioner in 4 weeks to discuss colonoscopy before the end of the year, this colonoscopy will be for screening      I discussed the patients findings and my recommendations with patient and nursing staff.    Cuong Nelson MD

## 2023-10-31 NOTE — PROGRESS NOTES
Name: Luisito Burton ADMIT: 10/25/2023   : 1965  PCP: Provider, No Known    MRN: [id number] LOS: 6 days   AGE/SEX: 58 y.o. male  ROOM: [facility]     Subjective   Subjective   No new events overnight.  Continues to have frequent loose stools, 1 every 1-2 hours.  Nonbloody, no abdominal pain, no nausea or vomiting.      Review of Systems   As above  Objective   Objective   Vital Signs  Temp:  [97.3 °F (36.3 °C)-98.2 °F (36.8 °C)] 98.2 °F (36.8 °C)  Heart Rate:  [] 97  Resp:  [16-18] 16  BP: (104-124)/(72-87) 117/73  SpO2:  [95 %-98 %] 97 %  on   ;   Device (Oxygen Therapy): room air  Body mass index is 28.29 kg/m².  Physical Exam    General: Alert, laying in bed, not in distress,  HEENT: Normocephalic, atraumatic  CV: Regular rate and rhythm, no murmurs rubs or gallops  Lungs: Clear to auscultation bilaterally, no crackles or wheezes  Abdomen: Soft, nontender, nondistended  Extremities: No significant peripheral edema , no cyanosis     Results Review     I reviewed the patient's new clinical results.  Results from last 7 days   Lab Units 10/31/23  0535 10/30/23  0556 10/29/23  0715 10/28/23  0631   WBC 10*3/mm3 15.48* 12.84* 9.76 8.07   HEMOGLOBIN g/dL 14.0 14.8 14.1 14.7   PLATELETS 10*3/mm3 299 295 241 228     Results from last 7 days   Lab Units 10/31/23  0535 10/30/23  0556 10/29/23  0715 10/28/23  0631   SODIUM mmol/L 134* 131* 136 135*   POTASSIUM mmol/L 3.8 3.6 3.5 3.0*   CHLORIDE mmol/L 104 96* 101 97*   CO2 mmol/L 22.0 24.0 24.0 26.3   BUN mg/dL 17 18 14 16   CREATININE mg/dL 1.06 1.17 0.88 0.87   GLUCOSE mg/dL 98 99 100* 98   Estimated Creatinine Clearance: 74.5 mL/min (by C-G formula based on SCr of 1.06 mg/dL).  Results from last 7 days   Lab Units 10/31/23  0535 10/30/23  0556 10/29/23  0715 10/28/23  0631 10/27/23  0539 10/26/23  0552 10/25/23  1318   ALBUMIN g/dL 3.4* 3.7 3.5 3.6   < > 4.3 4.8   BILIRUBIN mg/dL  --   --   --   --   --  0.9 0.8   ALK PHOS U/L  --   --   --   --   --  58  "75   AST (SGOT) U/L  --   --   --   --   --  18 17   ALT (SGPT) U/L  --   --   --   --   --  28 31    < > = values in this interval not displayed.     Results from last 7 days   Lab Units 10/31/23  0535 10/30/23  0556 10/29/23  0715 10/28/23  0631   CALCIUM mg/dL 8.7 9.6 9.1 9.2   ALBUMIN g/dL 3.4* 3.7 3.5 3.6   MAGNESIUM mg/dL 2.1 2.2 2.1 2.2   PHOSPHORUS mg/dL 2.4* 3.3 2.2* 2.3*     Results from last 7 days   Lab Units 10/27/23  1659 10/25/23  1825 10/25/23  1441   LACTATE mmol/L 1.5 2.0 2.7*     SARS-CoV-2, TAVARES   Date Value Ref Range Status   12/07/2022 DETECTED (A) NOT DETECTED Final     Comment:       A Detected result is considered a positive test result  for COVID-19.  This indicates that RNA from SARS-CoV-2  (formerly 2019-nCoV) was detected, and the patient is  infected with the virus and presumed to be contagious.  If requested by public health authority, specimen will  be sent for additional testing.    Please review the \"Fact Sheets\" and FDA authorized  labeling available for health care providers and  patients using the following websites:  [email or web address]-[email or web address]/[email or web address]  [employer]/home/Covid-19/Patients/[employer]/flu-fact-  sheet.[email or web address]      Please note: If a not detected (negative) Influenza A or  Influenza B is obtained, this means viral RNA was not  present in the specimen above the limit of detection. A  negative result does not rule out the possibility of  influenza and should not be used as the sole basis for  treatment or patient management decisions.  If Influenza  is still suspected, based on exposure history together  with other clinical findings, re-testing should be  considered.    This test has been authorized by the FDA under  an Emergency Use Authorization (EUA) for use by authorized  laboratories.      Due to the current public health emergency, [facility] is receiving a high volume of samples from  a wide variety of swabs and media " "for COVID-19 testing.  In order to serve patients during this public health  crisis, samples from appropriate clinical sources are   being tested. Negative test results derived from  specimens received in non-commercially manufactured  viral collection and transport media, or in media and  sample collection kits not yet authorized by FDA for  COVID-19 testing should be cautiously evaluated and the  patient potentially subjected to extra precautions such  as additional clinical monitoring, including collection  of an additional specimen.    Methodology:  Nucleic Acid Amplification Test (NAAT)  includes RT-PCR or TMA      Additional information about COVID-19 can be found  at the [facility] website:  [email or web address].    For patients with a Detected or Inconclusive test  result, please see CDC's COVID-19 Treatments and   Medications page located at   https://www.cdc.gov/coronavirus/2019-ncov/your-health/treatments-for-  severe-illness.html for information on COVID-19 therapeutics.    For patients with a Not Detected test result, please see CDC's  Vaccines for COVID-19 page located at  https://www.cdc.gov/coronavirus/2019-ncov/vaccines/index.html  for information on COVID-19 vaccines.     No results found for: \"HGBA1C\", \"POCGLU\"        XR Abdomen KUB  XR ABDOMEN KUB-     HISTORY: 58-year-old male with abdominal pain and diarrhea.     FINDINGS: There is a paucity of formed stool within the colon. There is  no evidence for bowel obstruction.     This report was finalized on 10/29/2023 5:39 PM by Dr. Bre Dowling M.D  on Workstation: [id number]       Scheduled Medications  cefTRIAXone, 2,000 mg, Intravenous, Q24H  cholestyramine, 2 packet, Oral, Q8H  heparin (porcine), 5,000 Units, Subcutaneous, Q8H  pantoprazole, 40 mg, Intravenous, Q AM  potassium & sodium phosphates, 2 packet, Oral, BID AC  sodium chloride, 10 mL, Intravenous, Q12H    Infusions  sodium chloride, 100 mL/hr, Last Rate: 100 mL/hr " (10/31/23 4336)    Diet  Diet: Gastrointestinal Diets; Lactose-Controlled; Texture: Regular Texture (IDDSI 7); Fluid Consistency: Thin (IDDSI 0)    I have personally reviewed     [x]  Laboratory   [x]  Microbiology   []  Radiology   []  EKG/Telemetry  []  Cardiology/Vascular   []  Pathology    []  Records       Assessment/Plan     Active Hospital Problems    Diagnosis  POA    **ARF (acute renal failure) [N17.9]  Yes    Salmonella gastroenteritis [A02.0]  Yes    Salmonella bacteremia [R78.81]  Unknown    Nausea and vomiting [R11.2]  Yes    Dehydration [E86.0]  Yes    Diarrhea [R19.7]  Yes      Resolved Hospital Problems   No resolved problems to display.       Salmonella bacteremia secondary to salmonella enteritis   Resolved nausea and vomiting.    Continues with significant diarrhea.    Continue cholestyramine, as needed Imodium   Repeat blood cultures negative to date  Resume IV fluids as patient continues to have significant diarrhea  Continue IV ceftriaxone, ID managing  -Consulted GI per patient's request  -WBC trending up, ordered C. difficile in the setting of the ongoing diarrhea, WBC trending up, which was negative.  -Discussed with infectious disease    Acute kidney failure/hypokalemia/hypomagnesemia/metabolic acidosis mostly secondary to prerenal azotemia leading to hypovolemia and subsequent acute tubular necrosis.    Creatinine stable today.  Continue IV fluids.  Nephrology on board.      Hyponatremia: Sodium trending up, continue IV fluids.      Prediabetes.  Diet at discharge.      VTE prophylaxis.  Subcu heparin         SCDs for DVT prophylaxis.  Full code.  Discussed with patient and spouse.  Discussed in multidisciplinary rounds with nursing staff, CCP, pharmacy  Anticipate discharge home, likely in 1-2 days if diarrhea improves and cleared by consultants.      Copied text in this note has been reviewed and is accurate as of 10/31/23.         Dictated utilizing Dragon dictation        Karina BIRMINGHAM  MD Paco  [facility]  10/31/23  11:55 EDT

## 2023-11-01 ENCOUNTER — APPOINTMENT (OUTPATIENT)
Dept: CT IMAGING | Facility: HOSPITAL | Age: 58
DRG: 372 | End: 2023-11-01
Payer: COMMERCIAL

## 2023-11-01 LAB
ALBUMIN SERPL-MCNC: 3.4 G/DL (ref 3.5–5.2)
ALBUMIN SERPL-MCNC: 3.5 G/DL (ref 3.5–5.2)
ALP SERPL-CCNC: 78 U/L (ref 39–117)
ALT SERPL W P-5'-P-CCNC: 48 U/L (ref 1–41)
ANION GAP SERPL CALCULATED.3IONS-SCNC: 9 MMOL/L (ref 5–15)
AST SERPL-CCNC: 31 U/L (ref 1–40)
BASOPHILS # BLD AUTO: 0.04 10*3/MM3 (ref 0–0.2)
BASOPHILS NFR BLD AUTO: 0.4 % (ref 0–1.5)
BILIRUB CONJ SERPL-MCNC: <0.2 MG/DL (ref 0–0.3)
BILIRUB INDIRECT SERPL-MCNC: ABNORMAL MG/DL
BILIRUB SERPL-MCNC: 0.5 MG/DL (ref 0–1.2)
BUN SERPL-MCNC: 15 MG/DL (ref 6–20)
BUN/CREAT SERPL: 15.3 (ref 7–25)
CALCIUM SPEC-SCNC: 9 MG/DL (ref 8.6–10.5)
CHLORIDE SERPL-SCNC: 105 MMOL/L (ref 98–107)
CO2 SERPL-SCNC: 21 MMOL/L (ref 22–29)
CREAT SERPL-MCNC: 0.98 MG/DL (ref 0.76–1.27)
DEPRECATED RDW RBC AUTO: 41.5 FL (ref 37–54)
EGFRCR SERPLBLD CKD-EPI 2021: 90.5 ML/MIN/1.73
EOSINOPHIL # BLD AUTO: 0.09 10*3/MM3 (ref 0–0.4)
EOSINOPHIL NFR BLD AUTO: 0.8 % (ref 0.3–6.2)
ERYTHROCYTE [DISTWIDTH] IN BLOOD BY AUTOMATED COUNT: 13 % (ref 12.3–15.4)
GLUCOSE SERPL-MCNC: 105 MG/DL (ref 65–99)
HCT VFR BLD AUTO: 39 % (ref 37.5–51)
HGB BLD-MCNC: 13.7 G/DL (ref 13–17.7)
IMM GRANULOCYTES # BLD AUTO: 0.09 10*3/MM3 (ref 0–0.05)
IMM GRANULOCYTES NFR BLD AUTO: 0.8 % (ref 0–0.5)
LYMPHOCYTES # BLD AUTO: 1.54 10*3/MM3 (ref 0.7–3.1)
LYMPHOCYTES NFR BLD AUTO: 13.7 % (ref 19.6–45.3)
MAGNESIUM SERPL-MCNC: 2.1 MG/DL (ref 1.6–2.6)
MCH RBC QN AUTO: 30.9 PG (ref 26.6–33)
MCHC RBC AUTO-ENTMCNC: 35.1 G/DL (ref 31.5–35.7)
MCV RBC AUTO: 87.8 FL (ref 79–97)
MONOCYTES # BLD AUTO: 0.9 10*3/MM3 (ref 0.1–0.9)
MONOCYTES NFR BLD AUTO: 8 % (ref 5–12)
NEUTROPHILS NFR BLD AUTO: 76.3 % (ref 42.7–76)
NEUTROPHILS NFR BLD AUTO: 8.55 10*3/MM3 (ref 1.7–7)
NRBC BLD AUTO-RTO: 0 /100 WBC (ref 0–0.2)
PHOSPHATE SERPL-MCNC: 2.4 MG/DL (ref 2.5–4.5)
PLATELET # BLD AUTO: 304 10*3/MM3 (ref 140–450)
PMV BLD AUTO: 9.7 FL (ref 6–12)
POTASSIUM SERPL-SCNC: 3.6 MMOL/L (ref 3.5–5.2)
PROT SERPL-MCNC: 6.6 G/DL (ref 6–8.5)
RBC # BLD AUTO: 4.44 10*6/MM3 (ref 4.14–5.8)
SODIUM SERPL-SCNC: 135 MMOL/L (ref 136–145)
WBC NRBC COR # BLD: 11.21 10*3/MM3 (ref 3.4–10.8)

## 2023-11-01 PROCEDURE — 74176 CT ABD & PELVIS W/O CONTRAST: CPT

## 2023-11-01 PROCEDURE — 25010000002 HEPARIN (PORCINE) PER 1000 UNITS: Performed by: INTERNAL MEDICINE

## 2023-11-01 PROCEDURE — 25010000002 ENOXAPARIN PER 10 MG: Performed by: STUDENT IN AN ORGANIZED HEALTH CARE EDUCATION/TRAINING PROGRAM

## 2023-11-01 PROCEDURE — 25010000002 CEFTRIAXONE PER 250 MG: Performed by: STUDENT IN AN ORGANIZED HEALTH CARE EDUCATION/TRAINING PROGRAM

## 2023-11-01 PROCEDURE — 80053 COMPREHEN METABOLIC PANEL: CPT | Performed by: INTERNAL MEDICINE

## 2023-11-01 PROCEDURE — 25810000003 SODIUM CHLORIDE 0.9 % SOLUTION: Performed by: STUDENT IN AN ORGANIZED HEALTH CARE EDUCATION/TRAINING PROGRAM

## 2023-11-01 PROCEDURE — 83735 ASSAY OF MAGNESIUM: CPT | Performed by: INTERNAL MEDICINE

## 2023-11-01 PROCEDURE — 84100 ASSAY OF PHOSPHORUS: CPT | Performed by: INTERNAL MEDICINE

## 2023-11-01 PROCEDURE — 82248 BILIRUBIN DIRECT: CPT | Performed by: INTERNAL MEDICINE

## 2023-11-01 PROCEDURE — 99232 SBSQ HOSP IP/OBS MODERATE 35: CPT | Performed by: INTERNAL MEDICINE

## 2023-11-01 PROCEDURE — 85025 COMPLETE CBC W/AUTO DIFF WBC: CPT | Performed by: INTERNAL MEDICINE

## 2023-11-01 RX ORDER — DIPHENOXYLATE HCL/ATROPINE 2.5-.025/5
5 LIQUID (ML) ORAL EVERY 6 HOURS
Status: DISCONTINUED | OUTPATIENT
Start: 2023-11-01 | End: 2023-11-01 | Stop reason: CLARIF

## 2023-11-01 RX ORDER — DIPHENOXYLATE HYDROCHLORIDE AND ATROPINE SULFATE 2.5; .025 MG/1; MG/1
1 TABLET ORAL EVERY 6 HOURS
Status: DISCONTINUED | OUTPATIENT
Start: 2023-11-01 | End: 2023-11-02

## 2023-11-01 RX ORDER — ENOXAPARIN SODIUM 100 MG/ML
40 INJECTION SUBCUTANEOUS EVERY 24 HOURS
Status: DISCONTINUED | OUTPATIENT
Start: 2023-11-01 | End: 2023-11-04 | Stop reason: HOSPADM

## 2023-11-01 RX ADMIN — CHOLESTYRAMINE 2 PACKET: 4 POWDER, FOR SUSPENSION ORAL at 03:40

## 2023-11-01 RX ADMIN — Medication 2 PACKET: at 09:02

## 2023-11-01 RX ADMIN — DIPHENOXYLATE HYDROCHLORIDE AND ATROPINE SULFATE 1 TABLET: 2.5; .025 TABLET ORAL at 18:42

## 2023-11-01 RX ADMIN — HEPARIN SODIUM 5000 UNITS: 5000 INJECTION INTRAVENOUS; SUBCUTANEOUS at 06:33

## 2023-11-01 RX ADMIN — ENOXAPARIN SODIUM 40 MG: 100 INJECTION SUBCUTANEOUS at 12:25

## 2023-11-01 RX ADMIN — DIPHENOXYLATE HYDROCHLORIDE AND ATROPINE SULFATE 1 TABLET: 2.5; .025 TABLET ORAL at 13:38

## 2023-11-01 RX ADMIN — SODIUM CHLORIDE 100 ML/HR: 9 INJECTION, SOLUTION INTRAVENOUS at 13:40

## 2023-11-01 RX ADMIN — CHOLESTYRAMINE 2 PACKET: 4 POWDER, FOR SUSPENSION ORAL at 18:42

## 2023-11-01 RX ADMIN — CHOLESTYRAMINE 2 PACKET: 4 POWDER, FOR SUSPENSION ORAL at 12:25

## 2023-11-01 RX ADMIN — PANTOPRAZOLE SODIUM 40 MG: 40 TABLET, DELAYED RELEASE ORAL at 09:02

## 2023-11-01 RX ADMIN — Medication 2 PACKET: at 18:42

## 2023-11-01 RX ADMIN — Medication 10 ML: at 09:08

## 2023-11-01 RX ADMIN — PANTOPRAZOLE SODIUM 40 MG: 40 TABLET, DELAYED RELEASE ORAL at 18:42

## 2023-11-01 RX ADMIN — CEFTRIAXONE 2000 MG: 2 INJECTION, POWDER, FOR SOLUTION INTRAMUSCULAR; INTRAVENOUS at 13:38

## 2023-11-01 RX ADMIN — Medication 10 ML: at 20:25

## 2023-11-01 NOTE — PLAN OF CARE
Goal Outcome Evaluation:  Plan of Care Reviewed With: patient        Progress: improving  Outcome Evaluation: Patient continues to have multiple BMs during this shift.  VSS. No pain noted.  Helpful family at bedside.  IVF.  Up Ad Salome.  Will continue to monitor labs and reassess as needed.

## 2023-11-01 NOTE — PROGRESS NOTES
[facility]  Inpatient Progress Note    Reason for Follow Up: Salmonella gastroenteritis    Subjective     Interval History:   Unchanged with regard to stool frequency    Current Facility-Administered Medications:     acetaminophen (TYLENOL) tablet 650 mg, 650 mg, Oral, Q4H PRN, 650 mg at 10/25/23 2043 **OR** acetaminophen (TYLENOL) 160 MG/5ML oral solution 650 mg, 650 mg, Oral, Q4H PRN **OR** acetaminophen (TYLENOL) suppository 650 mg, 650 mg, Rectal, Q4H PRN, Benjy Guevara MD    cefTRIAXone (ROCEPHIN) 2,000 mg in sodium chloride 0.9 % 100 mL IVPB-VTB, 2,000 mg, Intravenous, Q24H, Karina Antonio MD    cholestyramine (QUESTRAN) packet 2 packet, 2 packet, Oral, Q8H, Corie West MD, 2 packet at 11/01/23 1225    diphenoxylate-atropine (LOMOTIL) 2.5-0.025 MG per tablet 1 tablet, 1 tablet, Oral, Q6H, Karina Antonio MD    Enoxaparin Sodium (LOVENOX) syringe 40 mg, 40 mg, Subcutaneous, Q24H, Karina Antonio MD, 40 mg at 11/01/23 1225    HYDROcodone-acetaminophen (NORCO) 5-325 MG per tablet 1 tablet, 1 tablet, Oral, Q4H PRN, Benjy Guevara MD    influenza vac split quad (FLUZONE,FLUARIX,AFLURIA,FLULAVAL) injection 0.5 mL, 0.5 mL, Intramuscular, During Hospitalization, Corie West MD    nitroglycerin (NITROSTAT) SL tablet 0.4 mg, 0.4 mg, Sublingual, Q5 Min PRN, Benjy Guevara MD    ondansetron (ZOFRAN) tablet 4 mg, 4 mg, Oral, Q6H PRN **OR** ondansetron (ZOFRAN) injection 4 mg, 4 mg, Intravenous, Q6H PRN, Benjy Guevara MD    pantoprazole (PROTONIX) EC tablet 40 mg, 40 mg, Oral, BID WINNIE, Nemo Chu APRN, 40 mg at 11/01/23 0902    potassium & sodium phosphates (PHOS-NAK) oral packet, 2 packet, Oral, BID AC, Karina Antonio MD, 2 packet at 11/01/23 0902    sodium chloride 0.9 % flush 10 mL, 10 mL, Intravenous, Q12H, Benjy Guevara MD, 10 mL at 11/01/23 0908    sodium chloride 0.9 % flush 10 mL, 10 mL, Intravenous, PRN, Benjy Guevara  MD CONNOR    sodium chloride 0.9 % infusion 40 mL, 40 mL, Intravenous, PRN, Benjy Guevara MD    sodium chloride 0.9 % infusion, 100 mL/hr, Intravenous, Continuous, Karina Antonio MD, Last Rate: 100 mL/hr at 10/31/23 1638, 100 mL/hr at 10/31/23 1638  Review of Systems:    There is weakness and fatigue all other systems reviewed and negative    Objective     Vital Signs  Temp:  [97.7 °F (36.5 °C)-98.2 °F (36.8 °C)] 97.7 °F (36.5 °C)  Heart Rate:  [88-96] 88  Resp:  [16-18] 18  BP: (106-110)/(73-84) 106/79  Body mass index is 28.29 kg/m².    Intake/Output Summary (Last 24 hours) at 11/1/2023 1236  Last data filed at 11/1/2023 0900  Gross per 24 hour   Intake 480 ml   Output --   Net 480 ml     I/O this shift:  In: 240 [P.O.:240]  Out: -      Physical Exam:   General: patient awake, alert and cooperative   Eyes: Normal lids and lashes, no scleral icterus   Neck: supple, normal ROM   Skin: warm and dry, not jaundiced   Cardiovascular: regular rhythm and rate, no murmurs auscultated   Pulm: clear to auscultation bilaterally, regular and unlabored   Abdomen: soft, nontender, nondistended; normal bowel sounds   Extremities: no rash or edema   Psychiatric: Normal mood and behavior; memory intact     Results Review:     I reviewed the patient's new clinical results.    Results from last 7 days   Lab Units 11/01/23  0656 10/31/23  0535 10/30/23  0556   WBC 10*3/mm3 11.21* 15.48* 12.84*   HEMOGLOBIN g/dL 13.7 14.0 14.8   HEMATOCRIT % 39.0 39.8 42.4   PLATELETS 10*3/mm3 304 299 295     Results from last 7 days   Lab Units 11/01/23  0656 10/31/23  0535 10/30/23  0556 10/26/23  1506 10/26/23  0552 10/25/23  1318   SODIUM mmol/L 135* 134* 131*   < > 133* 128*   POTASSIUM mmol/L 3.6 3.8 3.6   < > 3.1* 3.4*   CHLORIDE mmol/L 105 104 96*   < > 96* 95*   CO2 mmol/L 21.0* 22.0 24.0   < > 21.8* 15.5*   BUN mg/dL 15 17 18   < > 55* 56*   CREATININE mg/dL 0.98 1.06 1.17   < > 2.81* 5.23*   CALCIUM mg/dL 9.0 8.7 9.6   < > 8.9 9.7    BILIRUBIN mg/dL 0.5  --   --   --  0.9 0.8   ALK PHOS U/L 78  --   --   --  58 75   ALT (SGPT) U/L 48*  --   --   --  28 31   AST (SGOT) U/L 31  --   --   --  18 17   GLUCOSE mg/dL 105* 98 99   < > 114* 160*    < > = values in this interval not displayed.         Lab Results   Lab Value Date/Time    LIPASE 32 10/25/2023 1318       Radiology:  CT Abdomen Pelvis Without Contrast   Final Result   FINDINGS most compatible with mild uncomplicated   diverticulitis of the sigmoid/left colon junction with fluid in the   colon.       This report was finalized on 11/1/2023 8:10 AM by Dr. Guru Donahue M.D   on Workstation: [id number]          XR Abdomen KUB   Final Result          Assessment & Plan     Active Hospital Problems    Diagnosis     **ARF (acute renal failure)     Salmonella gastroenteritis     Salmonella bacteremia     Nausea and vomiting     Dehydration     Diarrhea        Assessment:  Diarrhea  Nausea and vomiting resolved  Abdominal pain resolved  Gram-negative bacteremia  Salmonella gastroenteritis     Plan:  Antibiotics per infectious disease consultant  Continue Imodium at current dosage  He will receive an appointment with my nurse practitioner in 4 weeks to discuss colonoscopy before the end of the year, this colonoscopy will be for screening  We will follow        I discussed the patients findings and my recommendations with patient and nursing staff.    Cuong Nelson MD

## 2023-11-01 NOTE — PROGRESS NOTES
"[facility] Clinical Pharmacy Services: Enoxaparin Consult    Luisito PRITI Ballbarrett has a pharmacy consult to dose prophylactic enoxaparin per Dr Antonio's request.     Indication: VTE Prophylaxis  Home Anticoagulation: no     Relevant clinical data and objective history reviewed:  58 y.o. male 165.1 cm (65\") 77.1 kg (170 lb)   Body mass index is 28.29 kg/m².   Results from last 7 days   Lab Units 11/01/23  0656   PLATELETS 10*3/mm3 304     Estimated Creatinine Clearance: 80.6 mL/min (by C-G formula based on SCr of 0.98 mg/dL).    Assessment/Plan    Will start patient on Lovenox 40  subcutaneous every 24 hours, adjusted for renal function. Consult order will be discontinued but pharmacy will continue to follow.     Briseyda Smiley [facility]  Clinical Pharmacist    "

## 2023-11-01 NOTE — PLAN OF CARE
Goal Outcome Evaluation:  Plan of Care Reviewed With: patient        Progress: no change  Outcome Evaluation: Continues to have frequent diarrhea, changed to lomotil. IV antibiotics continue. Appetite improving. Up ad-alicia. No complaints of pain. WCTM.

## 2023-11-01 NOTE — PROGRESS NOTES
"   LOS: 7 days     Chief Complaint/ Reason for encounter: Acute renal failure with acidosis and electrolyte abnormalities    Subjective   10/27/23 : He feels substantially better today, eating and drinking a lot better but still not back to his usual baseline  No fevers or chills, decreased nausea, decreased abdominal pain and vomiting, decreased diarrhea  Tmax 99.9, making more urine but it is still dark    10/30: Still having high frequency, large volume diarrhea  No fevers or chills, still some crampy abdominal pain  Some nausea, no shortness of breath or chest pain reported    10/31: Frequency of diarrhea slightly improved, he states he is having a liquid bowel movement every 1-2 hours, slight increase consistency  No nausea vomiting, fevers or chills  Tolerating full clears well    11/01: no acute events. Patient feeling well. Still having diarrhea. On IVF. No chest pain or sob. He is drinking about 3-4 of the gray pitchers of water a day.    Medical history reviewed:  History of Present Illness    Subjective    History taken from: Patient and chart    Vital Signs  Temp:  [97.7 °F (36.5 °C)-98.2 °F (36.8 °C)] 97.7 °F (36.5 °C)  Heart Rate:  [88-96] 88  Resp:  [16-18] 18  BP: (106-110)/(73-84) 106/79       Wt Readings from Last 1 Encounters:   10/25/23 1304 77.1 kg (170 lb)       Objective:  Vital signs: (most recent): Blood pressure 106/79, pulse 88, temperature 97.7 °F (36.5 °C), temperature source Oral, resp. rate 18, height 165.1 cm (65\"), weight 77.1 kg (170 lb), SpO2 95%.                Objective:  General Appearance:  Comfortable, well-appearing, in no acute distress and not in pain.  Awake, alert, oriented  HEENT: Mucous membranes moist, no injury, oropharynx clear  Lungs:  Normal effort and normal respiratory rate.  Breath sounds clear to auscultation.  No  respiratory distress.  No rales, decreased breath sounds or rhonchi.    Heart: Normal rate.  Regular rhythm.  S1, S2 normal.  No murmur.   Abdomen: " Abdomen is soft.  Bowel sounds are normal, no abdominal tenderness.  There is no rebound or guarding  Extremities: Trace edema of bilateral lower extremities  Neurological: No focal motor or sensory deficits, pupils reactive  Skin:  Warm and dry.  No rash or cyanosis.       Results Review:    Intake/Output:     Intake/Output Summary (Last 24 hours) at 11/1/2023 0820  Last data filed at 11/1/2023 0547  Gross per 24 hour   Intake 240 ml   Output --   Net 240 ml         DATA:  Radiology and Labs:  The following labs independently reviewed by me. Additional labs ordered for tomorrow a.m.  Interval notes, chart personally reviewed by me.   Old records independently reviewed showing normal baseline renal function    Discussed with patient and family at bedside    Risk/ complexity of medical care/ medical decision making high risk, Salmonella enteric colitis with renal failure    Labs:   Recent Results (from the past 24 hour(s))   Clostridioides difficile Toxin, PCR - Stool, Per Rectum    Collection Time: 10/31/23 10:32 AM    Specimen: Per Rectum; Stool   Result Value Ref Range    Toxigenic C. difficile by PCR Negative Negative   Renal Function Panel    Collection Time: 11/01/23  6:56 AM    Specimen: Blood   Result Value Ref Range    Glucose 105 (H) 65 - 99 mg/dL    BUN 15 6 - 20 mg/dL    Creatinine 0.98 0.76 - 1.27 mg/dL    Sodium 135 (L) 136 - 145 mmol/L    Potassium 3.6 3.5 - 5.2 mmol/L    Chloride 105 98 - 107 mmol/L    CO2 21.0 (L) 22.0 - 29.0 mmol/L    Calcium 9.0 8.6 - 10.5 mg/dL    Albumin 3.5 3.5 - 5.2 g/dL    Phosphorus 2.4 (L) 2.5 - 4.5 mg/dL    Anion Gap 9.0 5.0 - 15.0 mmol/L    BUN/Creatinine Ratio 15.3 7.0 - 25.0    eGFR 90.5 >60.0 mL/min/1.73   Magnesium    Collection Time: 11/01/23  6:56 AM    Specimen: Blood   Result Value Ref Range    Magnesium 2.1 1.6 - 2.6 mg/dL   CBC Auto Differential    Collection Time: 11/01/23  6:56 AM    Specimen: Blood   Result Value Ref Range    WBC 11.21 (H) 3.40 - 10.80  10*3/mm3    RBC 4.44 4.14 - 5.80 10*6/mm3    Hemoglobin 13.7 13.0 - 17.7 g/dL    Hematocrit 39.0 37.5 - 51.0 %    MCV 87.8 79.0 - 97.0 fL    MCH 30.9 26.6 - 33.0 pg    MCHC 35.1 31.5 - 35.7 g/dL    RDW 13.0 12.3 - 15.4 %    RDW-SD 41.5 37.0 - 54.0 fl    MPV 9.7 6.0 - 12.0 fL    Platelets 304 140 - 450 10*3/mm3    Neutrophil % 76.3 (H) 42.7 - 76.0 %    Lymphocyte % 13.7 (L) 19.6 - 45.3 %    Monocyte % 8.0 5.0 - 12.0 %    Eosinophil % 0.8 0.3 - 6.2 %    Basophil % 0.4 0.0 - 1.5 %    Immature Grans % 0.8 (H) 0.0 - 0.5 %    Neutrophils, Absolute 8.55 (H) 1.70 - 7.00 10*3/mm3    Lymphocytes, Absolute 1.54 0.70 - 3.10 10*3/mm3    Monocytes, Absolute 0.90 0.10 - 0.90 10*3/mm3    Eosinophils, Absolute 0.09 0.00 - 0.40 10*3/mm3    Basophils, Absolute 0.04 0.00 - 0.20 10*3/mm3    Immature Grans, Absolute 0.09 (H) 0.00 - 0.05 10*3/mm3    nRBC 0.0 0.0 - 0.2 /100 WBC       Radiology:  Pertinent radiology studies were reviewed as described above      Medications have been reviewed separately in chart overview      ASSESSMENT:  severe renal failure.  Prerenal, markedly improved.  Creatinine went up slightly after IV fluids were stopped but better today  New hyponatremia, hypovolemic, improved with IV fluids  Diarrhea secondary to Salmonella, slightly better  Salmonella enteritis with sepsis  Hypokalemia from GI losses, improved after replacement  Hypophosphatemia, replaced orally  Hypoalbuminemia, encourage nutrition as able  Metabolic acidosis from renal failure and lactic acidosis, improving  Proteinuria, recheck after hydration  History of hypertension, not on meds  Severe dehydration  Hypovolemic hyponatremia, improved with IV fluids  Proteinuria, minimal on quantification       DISCUSSION/PLAN:   Renal function stable at baseline. MILAGRO resolved. Euvolemic  Will continue IV fluids as he continues to have frequent, profuse diarrhea  C. difficile testing negative  IV antibiotics per infectious disease  Acidosis overall stable.    Na slowly improving. Discussed cutting back on water intake.     Continue to monitor volume and electrolytes closely       Jeanette Lott MD  [facility]   Office phone number: [phone]  Answering service phone number: [phone]    11/01/23  08:20 EDT

## 2023-11-01 NOTE — PROGRESS NOTES
"  Infectious Diseases Progress Note    Robin Donovan MD     [facility]  Los: 8 days  Patient Identification:  Name: Luisito Burton  Age: 58 y.o.  Sex: male  :  1965  MRN: [id number]         Primary Care Physician: Provider, No Known        Subjective: Continues to feel well but still has diarrhea up to 14 times per day.  He is concerned that he may not be able to maintain the volume loss that occurs with diarrhea with oral intake only and not sure whether he is ready to be discharged.  Denies any fever or chills or abdominal pain.  Has preserved appetite.  Interval History: See consultation note.    Objective:    Scheduled Meds:bismuth subsalicylate, 524 mg, Oral, Q6H  cefTRIAXone, 2,000 mg, Intravenous, Q24H  enoxaparin, 40 mg, Subcutaneous, Q24H  loperamide, 4 mg, Oral, 4x Daily  pantoprazole, 40 mg, Oral, BID AC  sodium chloride, 10 mL, Intravenous, Q12H      Continuous Infusions:sodium chloride, 100 mL/hr, Last Rate: 100 mL/hr (23 0009)          Vital signs in last 24 hours:  Temp:  [98.1 °F (36.7 °C)-98.4 °F (36.9 °C)] 98.3 °F (36.8 °C)  Heart Rate:  [] 93  Resp:  [18] 18  BP: (102-109)/(76-84) 108/76    Intake/Output:    Intake/Output Summary (Last 24 hours) at 2023 0830  Last data filed at 2023 0640  Gross per 24 hour   Intake 1190 ml   Output --   Net 1190 ml       Exam:  /76 (BP Location: Right arm, Patient Position: Lying)   Pulse 93   Temp 98.3 °F (36.8 °C) (Oral)   Resp 18   Ht 165.1 cm (65\")   Wt 77.1 kg (170 lb)   SpO2 98%   BMI 28.29 kg/m²   Patient is examined using the personal protective equipment as per guidelines from infection control for this particular patient as enacted.  Hand washing was performed before and after patient interaction.  General Appearance:    Alert, cooperative, no distress, AAOx3                          Head:    Normocephalic, without obvious abnormality, atraumatic                           Eyes:    PERRL, " conjunctivae/corneas clear, EOM's intact, both eyes                         Throat:   Lips, tongue, gums normal; oral mucosa pink and moist                           Neck:   Supple, symmetrical, trachea midline, no JVD                         Lungs:    Clear to auscultation bilaterally, respirations unlabored                 Chest Wall:    No tenderness or deformity                          Heart:  S1-S2 regular                  Abdomen:   Soft nontender                 Extremities:   Extremities normal, atraumatic, no cyanosis or edema                        Pulses:   Pulses palpable in all extremities                            Skin:   Skin is warm and dry,  no rashes or palpable lesions                  Neurologic: Alert and oriented and grossly nonfocal    Data Review:    I reviewed the patient's new clinical results.  Results from last 7 days   Lab Units 11/02/23  0747 11/01/23  0656 10/31/23  0535 10/30/23  0556 10/29/23  0715 10/28/23  0631 10/27/23  0539   WBC 10*3/mm3 12.86* 11.21* 15.48* 12.84* 9.76 8.07 7.45   HEMOGLOBIN g/dL 13.5 13.7 14.0 14.8 14.1 14.7 15.7   PLATELETS 10*3/mm3 308 304 299 295 241 228 226     Results from last 7 days   Lab Units 11/01/23  0656 10/31/23  0535 10/30/23  0556 10/29/23  0715 10/28/23  0631 10/27/23  0539 10/26/23  1506   SODIUM mmol/L 135* 134* 131* 136 135* 132*  --    POTASSIUM mmol/L 3.6 3.8 3.6 3.5 3.0* 3.5 3.2*   CHLORIDE mmol/L 105 104 96* 101 97* 98  --    CO2 mmol/L 21.0* 22.0 24.0 24.0 26.3 23.0  --    BUN mg/dL 15 17 18 14 16 30*  --    CREATININE mg/dL 0.98 1.06 1.17 0.88 0.87 1.15  --    CALCIUM mg/dL 9.0 8.7 9.6 9.1 9.2 9.2  --    GLUCOSE mg/dL 105* 98 99 100* 98 109*  --      Microbiology Results (last 10 days)       Procedure Component Value - Date/Time    Clostridioides difficile Toxin - Stool, Per Rectum [[id number]]  (Normal) Collected: 10/31/23 1032    Lab Status: Final result Specimen: Stool from Per Rectum Updated: 10/31/23 1127    Narrative:      The  following orders were created for panel order Clostridioides difficile Toxin - Stool, Per Rectum.  Procedure                               Abnormality         Status                     ---------                               -----------         ------                     Clostridioides difficile...[[id number]]  Normal              Final result                 Please view results for these tests on the individual orders.    Clostridioides difficile Toxin, PCR - Stool, Per Rectum [[id number]]  (Normal) Collected: 10/31/23 1032    Lab Status: Final result Specimen: Stool from Per Rectum Updated: 10/31/23 1127     Toxigenic C. difficile by PCR Negative    Narrative:      The result indicates the absence of toxigenic C. difficile from stool specimen.     Blood Culture - Blood, Hand, Left [[id number]]  (Normal) Collected: 10/28/23 0723    Lab Status: Final result Specimen: Blood from Hand, Left Updated: 11/02/23 0746     Blood Culture No growth at 5 days    Blood Culture - Blood, Hand, Right [[id number]]  (Normal) Collected: 10/28/23 0716    Lab Status: Final result Specimen: Blood from Hand, Right Updated: 11/02/23 0746     Blood Culture No growth at 5 days    Blood Culture - Blood, Arm, Right [[id number]]  (Abnormal)  (Susceptibility) Collected: 10/26/23 2112    Lab Status: Final result Specimen: Blood from Arm, Right Updated: 10/30/23 0924     Blood Culture Salmonella group     Isolated from Aerobic and Anaerobic Bottles     Gram Stain Anaerobic Bottle Gram negative bacilli      Aerobic Bottle Gram negative bacilli    Narrative:      Refer to targeted stool culture collected on 10/25/2023 2007 for [facility] results.    Susceptibility        Salmonella group      KAREL Not Specified      Ampicillin Susceptible       Ceftriaxone Susceptible       Ciprofloxacin  Susceptible      Levofloxacin Intermediate       Trimethoprim + Sulfamethoxazole Susceptible                          Susceptibility Comments       Salmonella group     Cefazolin sensitivity will not be reported for Enterobacteriaceae in non-urine isolates. If cefazolin is preferred, please call the microbiology lab to request an E-test.  With the exception of urinary-sourced infections, aminoglycosides should not be used as monotherapy.  Ciprofloxacin sensitivity will not be automatically reported for extra-intestinal isolates. Ciprofloxacin performed by E-test   Salmonella and Shigella spp. may appear active in vitro to 1st/2nd generation cephalosporins/cephamycins and aminoglycosides but are not effective clinically.               Blood Culture - Blood, Arm, Right [[id number]]  (Abnormal) Collected: 10/26/23 2112    Lab Status: Final result Specimen: Blood from Arm, Right Updated: 10/30/23 0925     Blood Culture Salmonella group     Isolated from Aerobic and Anaerobic Bottles     Gram Stain Aerobic Bottle Gram negative bacilli      Anaerobic Bottle Gram negative bacilli    Narrative:      Refer to previous blood culture collected on 10/26/2023 2112 for MICs    Refer to previous targeted stool culture collected on 10/25/2023 2007 for [place] [facility] results.      Blood Culture ID, PCR - Blood, Arm, Right [[id number]]  (Abnormal) Collected: 10/26/23 2112    Lab Status: Final result Specimen: Blood from Arm, Right Updated: 10/27/23 1406     BCID, PCR Salmonella spp. Identification by BCID2 PCR     BOTTLE TYPE Anaerobic Bottle    Narrative:      No resistance genes detected.    Gastrointestinal Panel, PCR - Stool, Per Rectum [[id number]]  (Abnormal) Collected: 10/25/23 2007    Lab Status: Final result Specimen: Stool from Per Rectum Updated: 10/25/23 2214     Campylobacter Not Detected     Plesiomonas shigelloides Not Detected     Salmonella Detected     Vibrio Not Detected     Vibrio cholerae Not Detected     Yersinia enterocolitica Not Detected     Enteroaggregative E. coli (EAEC) Not Detected     Enteropathogenic E. coli (EPEC) Not Detected     Enterotoxigenic E. coli (ETEC) lt/st  Not Detected     Shiga-like toxin-producing E. coli (STEC) stx1/stx2 Not Detected     Shigella/Enteroinvasive E. coli (EIEC) Not Detected     Cryptosporidium Not Detected     Cyclospora cayetanensis Not Detected     Entamoeba histolytica Not Detected     Giardia lamblia Not Detected     Adenovirus F40/41 Not Detected     Astrovirus Not Detected     Norovirus GI/GII Not Detected     Rotavirus A Not Detected     Sapovirus (I, II, IV or V) Not Detected    Stool Culture, Targeted - Stool, Per Rectum [[id number]]  (Abnormal) Collected: 10/25/23 2007    Lab Status: Preliminary result Specimen: Stool from Per Rectum Updated: 10/30/23 0922     Stool Culture Salmonella group    Narrative:      Sent to [facility] for confirmation               Assessment:    ARF (acute renal failure)    Diarrhea    Nausea and vomiting    Dehydration    Salmonella gastroenteritis    Salmonella bacteremia  1-bacteremic Salmonella gastroenteritis of nontypeable type likely due to contaminated/infected farm eggs stored on room temperature for few days prior to consumption 3 to 4 days prior to onset of the symptoms-overall significant improvement with supportive care  2-severe metabolic compromise with dehydration and acute renal failure due to severe bacteremic gastroenteritis overall improved with supportive care  3-progressive leukocytosis etiology unclear-stool studies are negative for C. difficile as of 10/31/2023.        Recommendations/Discussions:  See my discussion and recommendation on 10/27 and 10/29/2023.  Continue with IV ceftriaxone while he is here  Reason for slight elevation in white blood cell count and subsequent improvement is unclear  Discussed with nephrology service and patient is cleared to have oral Bactrim at the time of discharge to complete 2 weeks of treatment.  Care plan and concept of care discussed with Dr. Antonio as well as with patient and his wife.  Robin Donovan MD  11/2/2023  08:30 EDT    Parts of this note may be an  electronic transcription/translation of spoken language to printed text using the Dragon dictation system.

## 2023-11-02 LAB
ALBUMIN SERPL-MCNC: 3.6 G/DL (ref 3.5–5.2)
ANION GAP SERPL CALCULATED.3IONS-SCNC: 8.5 MMOL/L (ref 5–15)
BACTERIA SPEC AEROBE CULT: NORMAL
BACTERIA SPEC AEROBE CULT: NORMAL
BASOPHILS # BLD AUTO: 0.07 10*3/MM3 (ref 0–0.2)
BASOPHILS NFR BLD AUTO: 0.5 % (ref 0–1.5)
BUN SERPL-MCNC: 14 MG/DL (ref 6–20)
BUN/CREAT SERPL: 12.7 (ref 7–25)
CALCIUM SPEC-SCNC: 8.7 MG/DL (ref 8.6–10.5)
CHLORIDE SERPL-SCNC: 109 MMOL/L (ref 98–107)
CO2 SERPL-SCNC: 18.5 MMOL/L (ref 22–29)
CREAT SERPL-MCNC: 1.1 MG/DL (ref 0.76–1.27)
DEPRECATED RDW RBC AUTO: 41.9 FL (ref 37–54)
EGFRCR SERPLBLD CKD-EPI 2021: 78.8 ML/MIN/1.73
EOSINOPHIL # BLD AUTO: 0.04 10*3/MM3 (ref 0–0.4)
EOSINOPHIL NFR BLD AUTO: 0.3 % (ref 0.3–6.2)
ERYTHROCYTE [DISTWIDTH] IN BLOOD BY AUTOMATED COUNT: 12.9 % (ref 12.3–15.4)
GLUCOSE SERPL-MCNC: 93 MG/DL (ref 65–99)
HCT VFR BLD AUTO: 39 % (ref 37.5–51)
HGB BLD-MCNC: 13.5 G/DL (ref 13–17.7)
IMM GRANULOCYTES # BLD AUTO: 0.09 10*3/MM3 (ref 0–0.05)
IMM GRANULOCYTES NFR BLD AUTO: 0.7 % (ref 0–0.5)
LYMPHOCYTES # BLD AUTO: 1.32 10*3/MM3 (ref 0.7–3.1)
LYMPHOCYTES NFR BLD AUTO: 10.3 % (ref 19.6–45.3)
MAGNESIUM SERPL-MCNC: 2.1 MG/DL (ref 1.6–2.6)
MCH RBC QN AUTO: 30.5 PG (ref 26.6–33)
MCHC RBC AUTO-ENTMCNC: 34.6 G/DL (ref 31.5–35.7)
MCV RBC AUTO: 88.2 FL (ref 79–97)
MONOCYTES # BLD AUTO: 0.82 10*3/MM3 (ref 0.1–0.9)
MONOCYTES NFR BLD AUTO: 6.4 % (ref 5–12)
NEUTROPHILS NFR BLD AUTO: 10.52 10*3/MM3 (ref 1.7–7)
NEUTROPHILS NFR BLD AUTO: 81.8 % (ref 42.7–76)
NRBC BLD AUTO-RTO: 0 /100 WBC (ref 0–0.2)
PHOSPHATE SERPL-MCNC: 2.5 MG/DL (ref 2.5–4.5)
PLATELET # BLD AUTO: 308 10*3/MM3 (ref 140–450)
PMV BLD AUTO: 9.4 FL (ref 6–12)
POTASSIUM SERPL-SCNC: 4 MMOL/L (ref 3.5–5.2)
RBC # BLD AUTO: 4.42 10*6/MM3 (ref 4.14–5.8)
SODIUM SERPL-SCNC: 136 MMOL/L (ref 136–145)
WBC NRBC COR # BLD: 12.86 10*3/MM3 (ref 3.4–10.8)

## 2023-11-02 PROCEDURE — 99232 SBSQ HOSP IP/OBS MODERATE 35: CPT

## 2023-11-02 PROCEDURE — 83735 ASSAY OF MAGNESIUM: CPT | Performed by: INTERNAL MEDICINE

## 2023-11-02 PROCEDURE — 25810000003 SODIUM CHLORIDE 0.9 % SOLUTION: Performed by: STUDENT IN AN ORGANIZED HEALTH CARE EDUCATION/TRAINING PROGRAM

## 2023-11-02 PROCEDURE — 80069 RENAL FUNCTION PANEL: CPT | Performed by: INTERNAL MEDICINE

## 2023-11-02 PROCEDURE — 25010000002 ENOXAPARIN PER 10 MG: Performed by: STUDENT IN AN ORGANIZED HEALTH CARE EDUCATION/TRAINING PROGRAM

## 2023-11-02 PROCEDURE — 85025 COMPLETE CBC W/AUTO DIFF WBC: CPT | Performed by: INTERNAL MEDICINE

## 2023-11-02 PROCEDURE — 25810000003 SODIUM CHLORIDE 0.9 % SOLUTION: Performed by: INTERNAL MEDICINE

## 2023-11-02 PROCEDURE — 25010000002 CEFTRIAXONE PER 250 MG: Performed by: STUDENT IN AN ORGANIZED HEALTH CARE EDUCATION/TRAINING PROGRAM

## 2023-11-02 RX ORDER — BISMUTH SUBSALICYLATE 262 MG/1
524 TABLET, CHEWABLE ORAL EVERY 6 HOURS
Status: DISCONTINUED | OUTPATIENT
Start: 2023-11-02 | End: 2023-11-04 | Stop reason: HOSPADM

## 2023-11-02 RX ORDER — LOPERAMIDE HYDROCHLORIDE 2 MG/1
4 CAPSULE ORAL 4 TIMES DAILY
Status: DISPENSED | OUTPATIENT
Start: 2023-11-02 | End: 2023-11-04

## 2023-11-02 RX ORDER — SODIUM BICARBONATE 650 MG/1
650 TABLET ORAL 3 TIMES DAILY
Status: DISCONTINUED | OUTPATIENT
Start: 2023-11-02 | End: 2023-11-04 | Stop reason: HOSPADM

## 2023-11-02 RX ORDER — DIPHENOXYLATE HYDROCHLORIDE AND ATROPINE SULFATE 2.5; .025 MG/1; MG/1
2 TABLET ORAL EVERY 6 HOURS
Status: DISCONTINUED | OUTPATIENT
Start: 2023-11-02 | End: 2023-11-02

## 2023-11-02 RX ADMIN — PANTOPRAZOLE SODIUM 40 MG: 40 TABLET, DELAYED RELEASE ORAL at 06:40

## 2023-11-02 RX ADMIN — LOPERAMIDE HYDROCHLORIDE 4 MG: 2 CAPSULE ORAL at 20:15

## 2023-11-02 RX ADMIN — DIPHENOXYLATE HYDROCHLORIDE AND ATROPINE SULFATE 1 TABLET: 2.5; .025 TABLET ORAL at 00:10

## 2023-11-02 RX ADMIN — SODIUM CHLORIDE 100 ML/HR: 9 INJECTION, SOLUTION INTRAVENOUS at 00:09

## 2023-11-02 RX ADMIN — CEFTRIAXONE 2000 MG: 2 INJECTION, POWDER, FOR SOLUTION INTRAMUSCULAR; INTRAVENOUS at 13:12

## 2023-11-02 RX ADMIN — ENOXAPARIN SODIUM 40 MG: 100 INJECTION SUBCUTANEOUS at 10:48

## 2023-11-02 RX ADMIN — SODIUM CHLORIDE 100 ML/HR: 9 INJECTION, SOLUTION INTRAVENOUS at 10:38

## 2023-11-02 RX ADMIN — DIPHENOXYLATE HYDROCHLORIDE AND ATROPINE SULFATE 1 TABLET: 2.5; .025 TABLET ORAL at 06:40

## 2023-11-02 RX ADMIN — LOPERAMIDE HYDROCHLORIDE 4 MG: 2 CAPSULE ORAL at 10:51

## 2023-11-02 RX ADMIN — DIBASIC SODIUM PHOSPHATE, MONOBASIC POTASSIUM PHOSPHATE AND MONOBASIC SODIUM PHOSPHATE 2 TABLET: 852; 155; 130 TABLET ORAL at 13:12

## 2023-11-02 RX ADMIN — Medication 524 MG: at 15:27

## 2023-11-02 RX ADMIN — DIBASIC SODIUM PHOSPHATE, MONOBASIC POTASSIUM PHOSPHATE AND MONOBASIC SODIUM PHOSPHATE 2 TABLET: 852; 155; 130 TABLET ORAL at 20:15

## 2023-11-02 RX ADMIN — PANTOPRAZOLE SODIUM 40 MG: 40 TABLET, DELAYED RELEASE ORAL at 18:24

## 2023-11-02 RX ADMIN — SODIUM BICARBONATE 650 MG: 650 TABLET ORAL at 20:15

## 2023-11-02 RX ADMIN — Medication 10 ML: at 10:49

## 2023-11-02 RX ADMIN — SODIUM BICARBONATE 650 MG: 650 TABLET ORAL at 15:18

## 2023-11-02 RX ADMIN — SODIUM CHLORIDE 75 ML/HR: 9 INJECTION, SOLUTION INTRAVENOUS at 23:03

## 2023-11-02 RX ADMIN — Medication 524 MG: at 20:16

## 2023-11-02 RX ADMIN — CHOLESTYRAMINE 2 PACKET: 4 POWDER, FOR SUSPENSION ORAL at 02:02

## 2023-11-02 RX ADMIN — LOPERAMIDE HYDROCHLORIDE 4 MG: 2 CAPSULE ORAL at 18:24

## 2023-11-02 RX ADMIN — Medication 524 MG: at 10:47

## 2023-11-02 RX ADMIN — Medication 10 ML: at 20:16

## 2023-11-02 NOTE — PROGRESS NOTES
"Nutrition Services    Patient Name:  Luisito Burton  YOB: 1965  MRN: [id number]  Admit Date:  10/25/2023    Assessment Date:  11/02/23    Summary: Follow up:  Pt continues to have diarrhea ~15x/day. Pt stated he has a good appetite and tolerating Lactose-controlled diet well with 50-75% po intake of meals. Pt also drinking Boost Breeze and Gatorade. Denies N/V. GI following and adjusted meds, on loperamide 4x/day and lomotil q 6 hrs started 11/1. Pt also remains of IVF@75ml/hr. Renal team following. Encouraged adequate po intake at all meals/ONS.   Recommend:  Continue Boost Breeze BID.  Will follow per protocol.    CLINICAL NUTRITION ASSESSMENT      Reason for Assessment Follow-up Protocol     Diagnosis/Problem   CC: N/V/D  Dx: Salmonella, gastroenteritis, ARF, dehydration, diarrhea, N/V   Medical/Surgical History History reviewed. No pertinent past medical history.    Past Surgical History:   Procedure Laterality Date    FACIAL FRACTURE SURGERY      SHOULDER SURGERY          Anthropometrics        Current Height  Current Weight  BMI kg/m2 Height: 165.1 cm (65\")  Weight: 77.1 kg (170 lb) (10/25/23 1304)  Body mass index is 28.29 kg/m².   Adjusted BMI (if applicable)    BMI Category Overweight (25 - 29.9)   Ideal Body Weight (IBW) 135 lb   Usual Body Weight (UBW) 186 lb   Weight Trend Loss, Amount/Timeframe: 15 lb (8%) wt loss x 1 week   Weight History Wt Readings from Last 30 Encounters:   10/25/23 1304 77.1 kg (170 lb)   03/01/20 1426 81.6 kg (180 lb)   01/08/18 1540 77.1 kg (170 lb)   08/13/16 1417 73.9 kg (163 lb)      --  Labs       Pertinent Labs    Results from last 7 days   Lab Units 11/02/23  0747 11/01/23  0656 10/31/23  0535   SODIUM mmol/L 136 135* 134*   POTASSIUM mmol/L 4.0 3.6 3.8   CHLORIDE mmol/L 109* 105 104   CO2 mmol/L 18.5* 21.0* 22.0   BUN mg/dL 14 15 17   CREATININE mg/dL 1.10 0.98 1.06   CALCIUM mg/dL 8.7 9.0 8.7   BILIRUBIN mg/dL  --  0.5  --    ALK PHOS U/L  --  78  --  " "  ALT (SGPT) U/L  --  48*  --    AST (SGOT) U/L  --  31  --    GLUCOSE mg/dL 93 105* 98     Results from last 7 days   Lab Units 11/02/23 0747 11/01/23 0656 10/31/23  0535   MAGNESIUM mg/dL 2.1 2.1 2.1   PHOSPHORUS mg/dL 2.5 2.4* 2.4*   HEMOGLOBIN g/dL 13.5 13.7 14.0   HEMATOCRIT % 39.0 39.0 39.8   WBC 10*3/mm3 12.86* 11.21* 15.48*   ALBUMIN g/dL 3.6 3.4*  3.5 3.4*     Results from last 7 days   Lab Units 11/02/23  0747 11/01/23  0656 10/31/23  0535 10/30/23  0556 10/29/23  0715   PLATELETS 10*3/mm3 308 304 299 295 241     SARS-CoV-2, TAVARES   Date Value Ref Range Status   12/07/2022 DETECTED (A) NOT DETECTED Final     Comment:       A Detected result is considered a positive test result  for COVID-19.  This indicates that RNA from SARS-CoV-2  (formerly 2019-nCoV) was detected, and the patient is  infected with the virus and presumed to be contagious.  If requested by public health authority, specimen will  be sent for additional testing.    Please review the \"Fact Sheets\" and FDA authorized  labeling available for health care providers and  patients using the following websites:  [email or web address]  [email or web address]  [email or web address]      Please note: If a not detected (negative) Influenza A or  Influenza B is obtained, this means viral RNA was not  present in the specimen above the limit of detection. A  negative result does not rule out the possibility of  influenza and should not be used as the sole basis for  treatment or patient management decisions.  If Influenza  is still suspected, based on exposure history together  with other clinical findings, re-testing should be  considered.    This test has been authorized by the FDA under  an Emergency Use Authorization (EUA) for use by authorized  laboratories.      Due to the current public health emergency, [facility] [employer] is receiving a high volume of samples from  a wide " variety of swabs and media for COVID-19 testing.  In order to serve patients during this public health  crisis, samples from appropriate clinical sources are   being tested. Negative test results derived from  specimens received in non-commercially manufactured  viral collection and transport media, or in media and  sample collection kits not yet authorized by FDA for  COVID-19 testing should be cautiously evaluated and the  patient potentially subjected to extra precautions such  as additional clinical monitoring, including collection  of an additional specimen.    Methodology:  Nucleic Acid Amplification Test (NAAT)  includes RT-PCR or TMA      Additional information about COVID-19 can be found  at the [employer] website:  [email or web address].    For patients with a Detected or Inconclusive test  result, please see CDC's COVID-19 Treatments and   Medications page located at   https://www.cdc.gov/coronavirus/2019-ncov/your-health/treatments-for-  severe-illness.html for information on COVID-19 therapeutics.    For patients with a Not Detected test result, please see CDC's  Vaccines for COVID-19 page located at  https://www.cdc.gov/coronavirus/2019-ncov/vaccines/index.html  for information on COVID-19 vaccines.     Lab Results   Component Value Date    HGBA1C 6.00 (H) 10/26/2023          Medications           Scheduled Medications bismuth subsalicylate, 524 mg, Oral, Q6H  cefTRIAXone, 2,000 mg, Intravenous, Q24H  enoxaparin, 40 mg, Subcutaneous, Q24H  loperamide, 4 mg, Oral, 4x Daily  pantoprazole, 40 mg, Oral, BID AC  phosphorus, 500 mg, Oral, BID  sodium bicarbonate, 650 mg, Oral, TID  sodium chloride, 10 mL, Intravenous, Q12H       Infusions sodium chloride, 75 mL/hr, Last Rate: 75 mL/hr (11/02/23 1346)       PRN Medications   acetaminophen **OR** acetaminophen **OR** acetaminophen    influenza vaccine    nitroglycerin    ondansetron **OR** ondansetron    sodium chloride    sodium chloride      Physical Findings          General Findings alert, oriented, overweight   Oral/Mouth Cavity WDL   Edema  no edema   Gastrointestinal diarrhea, last bowel movement: 11/2 ~15/day   Skin  skin intact   Tubes/Drains/Lines none   NFPE No clinical signs of muscle wasting or fat loss   --  Current Nutrition Orders & Evaluation of Intake       Oral Nutrition     Food Allergies NKFA   Current PO Diet Diet: Gastrointestinal Diets; Lactose-Controlled; Texture: Regular Texture (IDDSI 7); Fluid Consistency: Thin (IDDSI 0)   Supplement Boost Breeze, BID   PO Evaluation     % PO Intake 50-75%     Factors Affecting Intake: diarrhea   --  PES STATEMENT / NUTRITION DIAGNOSIS      Nutrition Dx Problem  Problem: Unintentional Weight Loss  Etiology: Factors Affecting Nutrition - N/V/D    Signs/Symptoms: Report of Minimal PO Intake and Unintended Weight Change     NUTRITION INTERVENTION / PLAN OF CARE      Intervention Goal(s) Maintain nutrition status, Improved nutrition related labs, Reduce/improve symptoms, Meet estimated needs, Disease management/therapy, Tolerate PO , Maintain intake, and Accepts oral nutrition supplement         RD Intervention/Action Encourage intake, Continue to monitor, Care plan reviewed, and Recommend/order: ONS   --      Prescription/Orders:       PO Diet       Supplements Boost Breeze BID      Enteral Nutrition       Parenteral Nutrition    New Prescription Ordered? Continue same per protocol   --      Monitor/Evaluation Per protocol   Discharge Plan/Needs Pending clinical course   --    RD to follow per protocol.      Electronically signed by:  Alison Ramsey RD  11/02/23 16:18 EDT

## 2023-11-02 NOTE — PROGRESS NOTES
Gastroenterology   Inpatient Progress Note    Reason for Follow Up: Salmonella gastroenteritis    Subjective  Interval History:   Stool frequency unchanged.  Pepto bismuth added to regimen by infectious disease earlier today.    Current Facility-Administered Medications:     acetaminophen (TYLENOL) tablet 650 mg, 650 mg, Oral, Q4H PRN, 650 mg at 10/25/23 2043 **OR** acetaminophen (TYLENOL) 160 MG/5ML oral solution 650 mg, 650 mg, Oral, Q4H PRN **OR** acetaminophen (TYLENOL) suppository 650 mg, 650 mg, Rectal, Q4H PRN, Benjy Guevara MD    bismuth subsalicylate (PEPTO BISMOL) chewable tablet 524 mg, 524 mg, Oral, Q6H, Karina Antonio MD, 524 mg at 11/02/23 1047    cefTRIAXone (ROCEPHIN) 2,000 mg in sodium chloride 0.9 % 100 mL IVPB-VTB, 2,000 mg, Intravenous, Q24H, Karina Antonio MD, Last Rate: 200 mL/hr at 11/01/23 1338, 2,000 mg at 11/01/23 1338    Enoxaparin Sodium (LOVENOX) syringe 40 mg, 40 mg, Subcutaneous, Q24H, Karina Antonio MD, 40 mg at 11/02/23 1048    influenza vac split quad (FLUZONE,FLUARIX,AFLURIA,FLULAVAL) injection 0.5 mL, 0.5 mL, Intramuscular, During Hospitalization, Corie West MD    loperamide (IMODIUM) capsule 4 mg, 4 mg, Oral, 4x Daily, Karina Antonio MD, 4 mg at 11/02/23 1051    nitroglycerin (NITROSTAT) SL tablet 0.4 mg, 0.4 mg, Sublingual, Q5 Min PRN, Benjy Guevara MD    ondansetron (ZOFRAN) tablet 4 mg, 4 mg, Oral, Q6H PRN **OR** ondansetron (ZOFRAN) injection 4 mg, 4 mg, Intravenous, Q6H PRN, Benjy Guevara MD    pantoprazole (PROTONIX) EC tablet 40 mg, 40 mg, Oral, BID AC, Nemo Chu, APRN, 40 mg at 11/02/23 0640    phosphorus (K PHOS NEUTRAL) tablet 2 tablet, 500 mg, Oral, BID, Karina Antonio MD    sodium chloride 0.9 % flush 10 mL, 10 mL, Intravenous, Q12H, Benjy Guevara MD, 10 mL at 11/02/23 1049    sodium chloride 0.9 % flush 10 mL, 10 mL, Intravenous, PRN, Benjy Guevara MD    sodium chloride 0.9 % infusion 40 mL,  40 mL, Intravenous, PRN, Benjy Guevara MD    sodium chloride 0.9 % infusion, 100 mL/hr, Intravenous, Continuous, Karina Antonio MD, Last Rate: 100 mL/hr at 11/02/23 1038, 100 mL/hr at 11/02/23 1038  Review of Systems:               All systems were reviewed and negative except for:  Constitution:  positive for See HPI  Gastrointestinal: positive for  diarrhea    Objective     Vital Signs  Temp:  [97.3 °F (36.3 °C)-98.4 °F (36.9 °C)] 97.3 °F (36.3 °C)  Heart Rate:  [] 85  Resp:  [18-20] 20  BP: (102-109)/(76-84) 107/78  Body mass index is 28.29 kg/m².                  General Appearance:  awake, alert, oriented, in no acute distress  Abdomen:  Soft, non-tender, normal bowel sounds; no bruits, organomegaly or masses.                Results Review:                I reviewed the patient's new clinical results.    Results from last 7 days   Lab Units 11/02/23  0747 11/01/23  0656 10/31/23  0535   WBC 10*3/mm3 12.86* 11.21* 15.48*   HEMOGLOBIN g/dL 13.5 13.7 14.0   HEMATOCRIT % 39.0 39.0 39.8   PLATELETS 10*3/mm3 308 304 299     Results from last 7 days   Lab Units 11/02/23  0747 11/01/23  0656 10/31/23  0535   SODIUM mmol/L 136 135* 134*   POTASSIUM mmol/L 4.0 3.6 3.8   CHLORIDE mmol/L 109* 105 104   CO2 mmol/L 18.5* 21.0* 22.0   BUN mg/dL 14 15 17   CREATININE mg/dL 1.10 0.98 1.06   CALCIUM mg/dL 8.7 9.0 8.7   BILIRUBIN mg/dL  --  0.5  --    ALK PHOS U/L  --  78  --    ALT (SGPT) U/L  --  48*  --    AST (SGOT) U/L  --  31  --    GLUCOSE mg/dL 93 105* 98         Lab Results   Lab Value Date/Time    LIPASE 32 10/25/2023 1318       Radiology:  CT Abdomen Pelvis Without Contrast   Final Result   FINDINGS most compatible with mild uncomplicated   diverticulitis of the sigmoid/left colon junction with fluid in the   colon.       This report was finalized on 11/1/2023 8:10 AM by Dr. Guru Donahue M.D   on Workstation: [id number]          XR Abdomen KUB   Final Result          Assessment & Plan     Active  Hospital Problems    Diagnosis     **ARF (acute renal failure)     Salmonella gastroenteritis     Salmonella bacteremia     Nausea and vomiting     Dehydration     Diarrhea        Assessment:  Diarrhea  Nausea and vomiting resolved  Abdominal pain resolved  Gram-negative bacteremia  Salmonella gastroenteritis    These problems are new to me      Plan:  Nursing staff to notify GI service on call if any change in clinical status.  Appreciate antibiotic recommendations per infectious disease  Continue Imodium and Pepto-Bismol in hopes of improving stool frequency  To consider addition of Banatrol supplementation twice daily for 1 week followed by once daily dosing for an additional week upon completion of recommended treatment plan by infectious disease  He will ultimately need outpatient follow-up in 4 weeks after discharge from inpatient setting to discuss timing of colonoscopy with hopes of completing prior to the end of the year.  Colonoscopy will be for screening purposes.  GI will continue to follow.    I discussed the patients findings and my recommendations with patient, family, and nursing staff.          TANNER Kaminski  [facility]  [address] [place] [address]

## 2023-11-02 NOTE — PROGRESS NOTES
Name: Luisito Burton ADMIT: 10/25/2023   : 1965  PCP: Provider, No Known    MRN: [id number] LOS: 8 days   AGE/SEX: 58 y.o. male  ROOM: [facility]     Subjective   Subjective   No new events overnight.  Laying in bed, spouse present at bedside.  Has not noticed significant difference in diarrhea with Lomotil.  Overall may be slight improvement, with approximately 13-14 bowel movements per day instead of previous 15-16.  Denies abdominal pain.  Good appetite.      Review of Systems   As above  Objective   Objective   Vital Signs  Temp:  [97.3 °F (36.3 °C)-98.4 °F (36.9 °C)] 97.3 °F (36.3 °C)  Heart Rate:  [] 85  Resp:  [18-20] 20  BP: (102-109)/(76-84) 107/78  SpO2:  [97 %-98 %] 97 %  on   ;   Device (Oxygen Therapy): room air  Body mass index is 28.29 kg/m².  Physical Exam    General: Alert, laying in bed, not in distress.  HEENT: Normocephalic, atraumatic  CV: Regular rate and rhythm, no murmurs rubs or gallops  Lungs: Clear to auscultation bilaterally, no crackles or wheezes  Abdomen: Soft, nondistended, nontender  Extremities: No significant peripheral edema , no cyanosis     Results Review     I reviewed the patient's new clinical results.  Results from last 7 days   Lab Units 23  0747 11/01/23  0656 10/31/23  0535 10/30/23  0556   WBC 10*3/mm3 12.86* 11.21* 15.48* 12.84*   HEMOGLOBIN g/dL 13.5 13.7 14.0 14.8   PLATELETS 10*3/mm3 308 304 299 295     Results from last 7 days   Lab Units 23  0747 11/01/23  0656 10/31/23  0535 10/30/23  05   SODIUM mmol/L 136 135* 134* 131*   POTASSIUM mmol/L 4.0 3.6 3.8 3.6   CHLORIDE mmol/L 109* 105 104 96*   CO2 mmol/L 18.5* 21.0* 22.0 24.0   BUN mg/dL 14 15 17 18   CREATININE mg/dL 1.10 0.98 1.06 1.17   GLUCOSE mg/dL 93 105* 98 99   Estimated Creatinine Clearance: 71.8 mL/min (by C-G formula based on SCr of 1.1 mg/dL).  Results from last 7 days   Lab Units 23  0747 23  0656 10/31/23  0535 10/30/23  0556   ALBUMIN g/dL 3.6 3.4*  3.5 3.4*  "3.7   BILIRUBIN mg/dL  --  0.5  --   --    ALK PHOS U/L  --  78  --   --    AST (SGOT) U/L  --  31  --   --    ALT (SGPT) U/L  --  48*  --   --      Results from last 7 days   Lab Units 11/02/23  0747 11/01/23  0656 10/31/23  0535 10/30/23  0556   CALCIUM mg/dL 8.7 9.0 8.7 9.6   ALBUMIN g/dL 3.6 3.4*  3.5 3.4* 3.7   MAGNESIUM mg/dL 2.1 2.1 2.1 2.2   PHOSPHORUS mg/dL 2.5 2.4* 2.4* 3.3     Results from last 7 days   Lab Units 10/27/23  1659   LACTATE mmol/L 1.5     SARS-CoV-2, TAVARES   Date Value Ref Range Status   12/07/2022 DETECTED (A) NOT DETECTED Final     Comment:       A Detected result is considered a positive test result  for COVID-19.  This indicates that RNA from SARS-CoV-2  (formerly 2019-nCoV) was detected, and the patient is  infected with the virus and presumed to be contagious.  If requested by public health authority, specimen will  be sent for additional testing.    Please review the \"Fact Sheets\" and FDA authorized  labeling available for health care providers and  patients using the following websites:  [employer]/home/Covid-19/HCP/Quest[employer]/flu-fact-sheet.html  [employer]/home/Covid-19/Patients/QuestIV[employer]/flu-fact-  sheet.html      Please note: If a not detected (negative) Influenza A or  Influenza B is obtained, this means viral RNA was not  present in the specimen above the limit of detection. A  negative result does not rule out the possibility of  influenza and should not be used as the sole basis for  treatment or patient management decisions.  If Influenza  is still suspected, based on exposure history together  with other clinical findings, re-testing should be  considered.    This test has been authorized by the FDA under  an Emergency Use Authorization (EUA) for use by authorized  laboratories.      Due to the current public health emergency, [employer] is receiving a high volume of samples from  a wide variety of swabs and media for COVID-19 testing.  In order to " "serve patients during this public health  crisis, samples from appropriate clinical sources are   being tested. Negative test results derived from  specimens received in non-commercially manufactured  viral collection and transport media, or in media and  sample collection kits not yet authorized by FDA for  COVID-19 testing should be cautiously evaluated and the  patient potentially subjected to extra precautions such  as additional clinical monitoring, including collection  of an additional specimen.    Methodology:  Nucleic Acid Amplification Test (NAAT)  includes RT-PCR or TMA      Additional information about COVID-19 can be found  at the [employer] website:  [email or web address].    For patients with a Detected or Inconclusive test  result, please see CDC's COVID-19 Treatments and   Medications page located at   https://www.cdc.gov/coronavirus/2019-ncov/your-health/treatments-for-  severe-illness.html for information on COVID-19 therapeutics.    For patients with a Not Detected test result, please see CDC's  Vaccines for COVID-19 page located at  https://www.cdc.gov/coronavirus/2019-ncov/vaccines/index.html  for information on COVID-19 vaccines.     No results found for: \"HGBA1C\", \"POCGLU\"        CT Abdomen Pelvis Without Contrast  Narrative: Examination: CT of the abdomen and pelvis without contrast     Technique: CT of the abdomen and pelvis without contrast per protocol.  Radiation dose reduction techniques were utilized, including automated  exposure control and exposure modulation based on body size.        History: Abdominal infection     Comparison: None available     Findings: Limited evaluation demonstrates atelectasis and scarring at  the lung bases, without consolidation, pleural effusion, or thorax. The  heart is normal in size, without pericardial effusion.     There is hepatic steatosis. Duodenal diverticulum is seen. A large  amount of fluid is seen in the colon. Colonic " diverticula are seen with  fat stranding around diverticula near the sigmoid and left colon  junction..     The gallbladder, spleen, adrenal glands, kidneys, pancreas, stomach,  small bowel, appendix, urinary bladder, and abdominal vasculature are  normal as evaluated on this noncontrast lesion. No intraperitoneal fluid  collection or free gas are seen. No enlarged lymph nodes are  demonstrated.     Bone windows demonstrate degenerative changes, without suspicious  osseous lesion seen.     Impression: FINDINGS most compatible with mild uncomplicated  diverticulitis of the sigmoid/left colon junction with fluid in the  colon.     This report was finalized on 11/1/2023 8:10 AM by Dr. Guru Donahue M.D  on Workstation: [id number]       Scheduled Medications  bismuth subsalicylate, 524 mg, Oral, Q6H  cefTRIAXone, 2,000 mg, Intravenous, Q24H  enoxaparin, 40 mg, Subcutaneous, Q24H  loperamide, 4 mg, Oral, 4x Daily  pantoprazole, 40 mg, Oral, BID AC  sodium chloride, 10 mL, Intravenous, Q12H    Infusions  sodium chloride, 100 mL/hr, Last Rate: 100 mL/hr (11/02/23 0009)    Diet  Diet: Gastrointestinal Diets; Lactose-Controlled; Texture: Regular Texture (IDDSI 7); Fluid Consistency: Thin (IDDSI 0)    I have personally reviewed     [x]  Laboratory   []  Microbiology   []  Radiology   []  EKG/Telemetry  []  Cardiology/Vascular   []  Pathology    []  Records       Assessment/Plan     Active Hospital Problems    Diagnosis  POA    **ARF (acute renal failure) [N17.9]  Yes    Salmonella gastroenteritis [A02.0]  Yes    Salmonella bacteremia [R78.81]  Unknown    Nausea and vomiting [R11.2]  Yes    Dehydration [E86.0]  Yes    Diarrhea [R19.7]  Yes      Resolved Hospital Problems   No resolved problems to display.       Salmonella bacteremia secondary to salmonella enteritis   -Resolved nausea and vomiting.    -Continues with significant diarrhea.    -Continue cholestyramine, a  -Repeat blood cultures 10/28/2023 negative to  date  -Continue IV fluids  Continue IV ceftriaxone, ID managing  -GI evaluated, recommend follow-up outpatient in 4 weeks to schedule colonoscopy,  -C. difficile 10/31/23 negative  -Repeat CT abdomen 11/01/2023-A large amount of fluid is seen in the colon, findings compatible with mild uncomplicated diverticulitis of the sigmoid/left colon junction with fluid in the  colon.  -Discussed with , findings unlikely diverticulitis.  Recommended total of 14-day course of antibiotics since he did blood cultures from 10/20/2023, Bactrim double stranded 1 tab twice daily at discharge.  -No significant change with Lomotil.  Change back to loperamide 4 mg 4 times daily.  Initiated on scheduled Pepto-Bismol for diarrhea, 525 mg every 6 hours.    Hypophosphatemia: Improved, low end of normal.  Ordered additiona K-Phos Neutral.  Monitor.      Acute kidney failure/hypokalemia/hypomagnesemia/metabolic acidosis mostly secondary to prerenal azotemia leading to hypovolemia and subsequent acute tubular necrosis.    Creatinine stable today.  Continue IV fluids.  Nephrology on board.      Hyponatremia: Resolved.  Continue IV fluids in the setting of ongoing oliguria.          Prediabetes.  Diet at discharge.        Unfortunately patient continues to have 13-14 bowel movements today not clinically stable to discharge currently.      Change to Lovenox or DVT prophylaxis.  Full code.  Discussed with patient and spouse.  Discussed in multidisciplinary rounds with nursing staff, CCP, pharmacy  Anticipate discharge home, 1-2 days if diarrhea improves.      Copied text in this note has been reviewed and is accurate as of 11/02/23.         Dictated utilizing Dragon dictation        Karina Antonio MD  [facility]  11/02/23  09:56 EDT

## 2023-11-02 NOTE — PROGRESS NOTES
"   LOS: 8 days     Chief Complaint/ Reason for encounter: Acute renal failure with acidosis and electrolyte abnormalities    Subjective   10/27/23 : He feels substantially better today, eating and drinking a lot better but still not back to his usual baseline  No fevers or chills, decreased nausea, decreased abdominal pain and vomiting, decreased diarrhea  Tmax 99.9, making more urine but it is still dark    10/30: Still having high frequency, large volume diarrhea  No fevers or chills, still some crampy abdominal pain  Some nausea, no shortness of breath or chest pain reported    10/31: Frequency of diarrhea slightly improved, he states he is having a liquid bowel movement every 1-2 hours, slight increase consistency  No nausea vomiting, fevers or chills  Tolerating full clears well    11/01: no acute events. Patient feeling well. Still having diarrhea. On IVF. No chest pain or sob. He is drinking about 3-4 of the gray pitchers of water a day.    11/2: No new clinical issues but still with fairly profuse, frequent diarrhea  He continues to eat and drink adequately, drinking Gatorade  Denies fevers or chills    Medical history reviewed:  History of Present Illness    Subjective    History taken from: Patient and chart    Vital Signs  Temp:  [97.3 °F (36.3 °C)-98.4 °F (36.9 °C)] 97.3 °F (36.3 °C)  Heart Rate:  [] 85  Resp:  [18-20] 20  BP: (102-109)/(76-84) 107/78       Wt Readings from Last 1 Encounters:   10/25/23 1304 77.1 kg (170 lb)       Objective:  Vital signs: (most recent): Blood pressure 107/78, pulse 85, temperature 97.3 °F (36.3 °C), temperature source Oral, resp. rate 20, height 165.1 cm (65\"), weight 77.1 kg (170 lb), SpO2 97%.                Objective:  General Appearance:  Comfortable, well-appearing, in no acute distress and not in pain.  Awake, alert, oriented  HEENT: Mucous membranes moist, no injury, oropharynx clear  Lungs:  Normal effort and normal respiratory rate.  Breath sounds clear to " auscultation.  No  respiratory distress.  No rales, decreased breath sounds or rhonchi.    Heart: Normal rate.  Regular rhythm.  S1, S2 normal.  No murmur.   Abdomen: Abdomen is soft.  Bowel sounds are normal, no abdominal tenderness.  There is no rebound or guarding  Extremities: Trace edema of bilateral lower extremities  Neurological: No focal motor or sensory deficits, pupils reactive  Skin:  Warm and dry.  No rash or cyanosis.       Results Review:    Intake/Output:     Intake/Output Summary (Last 24 hours) at 11/2/2023 1241  Last data filed at 11/2/2023 0640  Gross per 24 hour   Intake 950 ml   Output --   Net 950 ml         DATA:  Radiology and Labs:  The following labs independently reviewed by me. Additional labs ordered for tomorrow a.m.  Interval notes, chart personally reviewed by me.   Old records independently reviewed showing normal baseline renal function    Discussed with patient and family at bedside    Risk/ complexity of medical care/ medical decision making high risk, Salmonella enteric colitis with renal failure    Labs:   Recent Results (from the past 24 hour(s))   Renal Function Panel    Collection Time: 11/02/23  7:47 AM    Specimen: Blood   Result Value Ref Range    Glucose 93 65 - 99 mg/dL    BUN 14 6 - 20 mg/dL    Creatinine 1.10 0.76 - 1.27 mg/dL    Sodium 136 136 - 145 mmol/L    Potassium 4.0 3.5 - 5.2 mmol/L    Chloride 109 (H) 98 - 107 mmol/L    CO2 18.5 (L) 22.0 - 29.0 mmol/L    Calcium 8.7 8.6 - 10.5 mg/dL    Albumin 3.6 3.5 - 5.2 g/dL    Phosphorus 2.5 2.5 - 4.5 mg/dL    Anion Gap 8.5 5.0 - 15.0 mmol/L    BUN/Creatinine Ratio 12.7 7.0 - 25.0    eGFR 78.8 >60.0 mL/min/1.73   Magnesium    Collection Time: 11/02/23  7:47 AM    Specimen: Blood   Result Value Ref Range    Magnesium 2.1 1.6 - 2.6 mg/dL   CBC Auto Differential    Collection Time: 11/02/23  7:47 AM    Specimen: Blood   Result Value Ref Range    WBC 12.86 (H) 3.40 - 10.80 10*3/mm3    RBC 4.42 4.14 - 5.80 10*6/mm3     Hemoglobin 13.5 13.0 - 17.7 g/dL    Hematocrit 39.0 37.5 - 51.0 %    MCV 88.2 79.0 - 97.0 fL    MCH 30.5 26.6 - 33.0 pg    MCHC 34.6 31.5 - 35.7 g/dL    RDW 12.9 12.3 - 15.4 %    RDW-SD 41.9 37.0 - 54.0 fl    MPV 9.4 6.0 - 12.0 fL    Platelets 308 140 - 450 10*3/mm3    Neutrophil % 81.8 (H) 42.7 - 76.0 %    Lymphocyte % 10.3 (L) 19.6 - 45.3 %    Monocyte % 6.4 5.0 - 12.0 %    Eosinophil % 0.3 0.3 - 6.2 %    Basophil % 0.5 0.0 - 1.5 %    Immature Grans % 0.7 (H) 0.0 - 0.5 %    Neutrophils, Absolute 10.52 (H) 1.70 - 7.00 10*3/mm3    Lymphocytes, Absolute 1.32 0.70 - 3.10 10*3/mm3    Monocytes, Absolute 0.82 0.10 - 0.90 10*3/mm3    Eosinophils, Absolute 0.04 0.00 - 0.40 10*3/mm3    Basophils, Absolute 0.07 0.00 - 0.20 10*3/mm3    Immature Grans, Absolute 0.09 (H) 0.00 - 0.05 10*3/mm3    nRBC 0.0 0.0 - 0.2 /100 WBC       Radiology:  Pertinent radiology studies were reviewed as described above      Medications have been reviewed separately in chart overview      ASSESSMENT:  severe renal failure.  Prerenal, improved.  Creatinine 1.1 today which is near his baseline prior to admission  New hyponatremia, hypovolemic, improved with IV fluids  Diarrhea secondary to Salmonella, not much improvement  Salmonella enteritis with sepsis  Hypokalemia from GI losses, improved after replacement  Hypophosphatemia, replaced orally, borderline low normal today  Hypoalbuminemia, encourage nutrition as able, better  Metabolic acidosis from renal failure and lactic acidosis, worse today  History of hypertension, not on meds  Severe dehydration  Hypovolemic hyponatremia, improved with IV fluids  Proteinuria, minimal on quantification       DISCUSSION/PLAN:   Renal function, volume and electrolytes remain fairly stable today  He is a bit more acidotic related to the diarrhea and normal saline IVF   Will add oral sodium bicarb  Continue oral phosphorus replacement  Antibiotics per infectious disease  Okay to eventually transition to oral  "Bactrim from a renal standpoint now that his renal failure has resolved and no acute potassium or sodium abnormalities.  Discussed with Dr. Donovan    Continue to monitor volume and electrolytes closely  Try decreasing IV fluids a bit today and gradually \"weaning\" off       Hao Allen MD  [facility]   Office phone number: [phone]  Answering service phone number: [phone]    11/02/23  12:41 EDT      "

## 2023-11-02 NOTE — PLAN OF CARE
Goal Outcome Evaluation:  Plan of Care Reviewed With: patient        Progress: no change     VSS. No c/o pain. Wife at BS. Pt up ad alicia to BR. Still having frequent diarrhea. On questran and limotil. NS at 100 ml/hr and IV rocephin. Possible DC today with PO bactrim if diarrhea improving per MD notes.

## 2023-11-03 LAB
ALBUMIN SERPL-MCNC: 3.2 G/DL (ref 3.5–5.2)
ANION GAP SERPL CALCULATED.3IONS-SCNC: 7 MMOL/L (ref 5–15)
BASOPHILS # BLD AUTO: 0.05 10*3/MM3 (ref 0–0.2)
BASOPHILS NFR BLD AUTO: 0.6 % (ref 0–1.5)
BUN SERPL-MCNC: 13 MG/DL (ref 6–20)
BUN/CREAT SERPL: 14.1 (ref 7–25)
CALCIUM SPEC-SCNC: 8.3 MG/DL (ref 8.6–10.5)
CHLORIDE SERPL-SCNC: 111 MMOL/L (ref 98–107)
CO2 SERPL-SCNC: 22 MMOL/L (ref 22–29)
CREAT SERPL-MCNC: 0.92 MG/DL (ref 0.76–1.27)
DEPRECATED RDW RBC AUTO: 43.3 FL (ref 37–54)
EGFRCR SERPLBLD CKD-EPI 2021: 97.6 ML/MIN/1.73
EOSINOPHIL # BLD AUTO: 0.05 10*3/MM3 (ref 0–0.4)
EOSINOPHIL NFR BLD AUTO: 0.6 % (ref 0.3–6.2)
ERYTHROCYTE [DISTWIDTH] IN BLOOD BY AUTOMATED COUNT: 13.3 % (ref 12.3–15.4)
GLUCOSE SERPL-MCNC: 112 MG/DL (ref 65–99)
HCT VFR BLD AUTO: 36.9 % (ref 37.5–51)
HGB BLD-MCNC: 12.6 G/DL (ref 13–17.7)
IMM GRANULOCYTES # BLD AUTO: 0.05 10*3/MM3 (ref 0–0.05)
IMM GRANULOCYTES NFR BLD AUTO: 0.6 % (ref 0–0.5)
LYMPHOCYTES # BLD AUTO: 1.7 10*3/MM3 (ref 0.7–3.1)
LYMPHOCYTES NFR BLD AUTO: 19 % (ref 19.6–45.3)
MAGNESIUM SERPL-MCNC: 1.8 MG/DL (ref 1.6–2.6)
MCH RBC QN AUTO: 30.9 PG (ref 26.6–33)
MCHC RBC AUTO-ENTMCNC: 34.1 G/DL (ref 31.5–35.7)
MCV RBC AUTO: 90.4 FL (ref 79–97)
MONOCYTES # BLD AUTO: 0.7 10*3/MM3 (ref 0.1–0.9)
MONOCYTES NFR BLD AUTO: 7.8 % (ref 5–12)
NEUTROPHILS NFR BLD AUTO: 6.42 10*3/MM3 (ref 1.7–7)
NEUTROPHILS NFR BLD AUTO: 71.4 % (ref 42.7–76)
NRBC BLD AUTO-RTO: 0 /100 WBC (ref 0–0.2)
PHOSPHATE SERPL-MCNC: 2.3 MG/DL (ref 2.5–4.5)
PLATELET # BLD AUTO: 297 10*3/MM3 (ref 140–450)
PMV BLD AUTO: 9.4 FL (ref 6–12)
POTASSIUM SERPL-SCNC: 3.6 MMOL/L (ref 3.5–5.2)
RBC # BLD AUTO: 4.08 10*6/MM3 (ref 4.14–5.8)
SODIUM SERPL-SCNC: 140 MMOL/L (ref 136–145)
WBC NRBC COR # BLD: 8.97 10*3/MM3 (ref 3.4–10.8)

## 2023-11-03 PROCEDURE — 99232 SBSQ HOSP IP/OBS MODERATE 35: CPT

## 2023-11-03 PROCEDURE — 25010000002 ENOXAPARIN PER 10 MG: Performed by: STUDENT IN AN ORGANIZED HEALTH CARE EDUCATION/TRAINING PROGRAM

## 2023-11-03 PROCEDURE — 85025 COMPLETE CBC W/AUTO DIFF WBC: CPT | Performed by: INTERNAL MEDICINE

## 2023-11-03 PROCEDURE — 83735 ASSAY OF MAGNESIUM: CPT | Performed by: INTERNAL MEDICINE

## 2023-11-03 PROCEDURE — 25010000002 CEFTRIAXONE PER 250 MG: Performed by: STUDENT IN AN ORGANIZED HEALTH CARE EDUCATION/TRAINING PROGRAM

## 2023-11-03 PROCEDURE — 80069 RENAL FUNCTION PANEL: CPT | Performed by: INTERNAL MEDICINE

## 2023-11-03 RX ORDER — POTASSIUM CHLORIDE 750 MG/1
40 TABLET, FILM COATED, EXTENDED RELEASE ORAL ONCE
Status: COMPLETED | OUTPATIENT
Start: 2023-11-03 | End: 2023-11-03

## 2023-11-03 RX ADMIN — Medication 524 MG: at 02:16

## 2023-11-03 RX ADMIN — LOPERAMIDE HYDROCHLORIDE 4 MG: 2 CAPSULE ORAL at 10:07

## 2023-11-03 RX ADMIN — CEFTRIAXONE 2000 MG: 2 INJECTION, POWDER, FOR SOLUTION INTRAMUSCULAR; INTRAVENOUS at 12:38

## 2023-11-03 RX ADMIN — SODIUM BICARBONATE 650 MG: 650 TABLET ORAL at 20:24

## 2023-11-03 RX ADMIN — Medication 2 PACKET: at 10:26

## 2023-11-03 RX ADMIN — Medication 2 PACKET: at 17:30

## 2023-11-03 RX ADMIN — SODIUM BICARBONATE 650 MG: 650 TABLET ORAL at 15:18

## 2023-11-03 RX ADMIN — Medication 524 MG: at 20:24

## 2023-11-03 RX ADMIN — DIBASIC SODIUM PHOSPHATE, MONOBASIC POTASSIUM PHOSPHATE AND MONOBASIC SODIUM PHOSPHATE 2 TABLET: 852; 155; 130 TABLET ORAL at 10:07

## 2023-11-03 RX ADMIN — Medication 524 MG: at 15:18

## 2023-11-03 RX ADMIN — LOPERAMIDE HYDROCHLORIDE 4 MG: 2 CAPSULE ORAL at 20:25

## 2023-11-03 RX ADMIN — POTASSIUM CHLORIDE 40 MEQ: 750 TABLET, EXTENDED RELEASE ORAL at 14:00

## 2023-11-03 RX ADMIN — SODIUM BICARBONATE 650 MG: 650 TABLET ORAL at 10:07

## 2023-11-03 RX ADMIN — PANTOPRAZOLE SODIUM 40 MG: 40 TABLET, DELAYED RELEASE ORAL at 17:30

## 2023-11-03 RX ADMIN — LOPERAMIDE HYDROCHLORIDE 4 MG: 2 CAPSULE ORAL at 13:40

## 2023-11-03 RX ADMIN — Medication 524 MG: at 10:07

## 2023-11-03 RX ADMIN — ENOXAPARIN SODIUM 40 MG: 100 INJECTION SUBCUTANEOUS at 12:38

## 2023-11-03 RX ADMIN — PANTOPRAZOLE SODIUM 40 MG: 40 TABLET, DELAYED RELEASE ORAL at 06:24

## 2023-11-03 RX ADMIN — Medication 10 ML: at 10:26

## 2023-11-03 NOTE — PROGRESS NOTES
"   LOS: 9 days     Chief Complaint/ Reason for encounter: Acute renal failure with acidosis and electrolyte abnormalities    Subjective   10/27/23 : He feels substantially better today, eating and drinking a lot better but still not back to his usual baseline  No fevers or chills, decreased nausea, decreased abdominal pain and vomiting, decreased diarrhea  Tmax 99.9, making more urine but it is still dark    10/30: Still having high frequency, large volume diarrhea  No fevers or chills, still some crampy abdominal pain  Some nausea, no shortness of breath or chest pain reported    10/31: Frequency of diarrhea slightly improved, he states he is having a liquid bowel movement every 1-2 hours, slight increase consistency  No nausea vomiting, fevers or chills  Tolerating full clears well    11/01: no acute events. Patient feeling well. Still having diarrhea. On IVF. No chest pain or sob. He is drinking about 3-4 of the gray pitchers of water a day.    11/2: No new clinical issues but still with fairly profuse, frequent diarrhea  He continues to eat and drink adequately, drinking Gatorade  Denies fevers or chills    11/3: still c/o frequent BMs    Medical history reviewed:  History of Present Illness    Subjective    History taken from: Patient and chart    Vital Signs  Temp:  [97.3 °F (36.3 °C)-97.7 °F (36.5 °C)] 97.7 °F (36.5 °C)  Heart Rate:  [81-87] 86  Resp:  [16-20] 16  BP: (106-118)/(71-84) 108/71       Wt Readings from Last 1 Encounters:   10/25/23 1304 77.1 kg (170 lb)       Objective:  Vital signs: (most recent): Blood pressure 108/71, pulse 86, temperature 97.7 °F (36.5 °C), temperature source Oral, resp. rate 16, height 165.1 cm (65\"), weight 77.1 kg (170 lb), SpO2 97%.                Objective:  General Appearance:  Comfortable, well-appearing, in no acute distress and not in pain.  Awake, alert, oriented  HEENT: Mucous membranes moist, no injury, oropharynx clear  Lungs:  Normal effort and normal " respiratory rate.  Breath sounds clear to auscultation.  No  respiratory distress.  No rales, decreased breath sounds or rhonchi.    Heart: Normal rate.  Regular rhythm.  S1, S2 normal.  No murmur.   Abdomen: Abdomen is soft.  Bowel sounds are normal, no abdominal tenderness.  There is no rebound or guarding  Extremities: Trace edema of bilateral lower extremities  Neurological: No focal motor or sensory deficits, pupils reactive  Skin:  Warm and dry.  No rash or cyanosis.       Results Review:    Intake/Output:     Intake/Output Summary (Last 24 hours) at 11/3/2023 0935  Last data filed at 11/3/2023 0625  Gross per 24 hour   Intake 1460 ml   Output --   Net 1460 ml         DATA:  Radiology and Labs:  The following labs independently reviewed by me. Additional labs ordered for tomorrow a.m.  Interval notes, chart personally reviewed by me.   Old records independently reviewed showing normal baseline renal function    Discussed with patient and family at bedside    Risk/ complexity of medical care/ medical decision making high risk, Salmonella enteric colitis with renal failure    Labs:   Recent Results (from the past 24 hour(s))   Renal Function Panel    Collection Time: 11/03/23  7:00 AM    Specimen: Blood   Result Value Ref Range    Glucose 112 (H) 65 - 99 mg/dL    BUN 13 6 - 20 mg/dL    Creatinine 0.92 0.76 - 1.27 mg/dL    Sodium 140 136 - 145 mmol/L    Potassium 3.6 3.5 - 5.2 mmol/L    Chloride 111 (H) 98 - 107 mmol/L    CO2 22.0 22.0 - 29.0 mmol/L    Calcium 8.3 (L) 8.6 - 10.5 mg/dL    Albumin 3.2 (L) 3.5 - 5.2 g/dL    Phosphorus 2.3 (L) 2.5 - 4.5 mg/dL    Anion Gap 7.0 5.0 - 15.0 mmol/L    BUN/Creatinine Ratio 14.1 7.0 - 25.0    eGFR 97.6 >60.0 mL/min/1.73   Magnesium    Collection Time: 11/03/23  7:00 AM    Specimen: Blood   Result Value Ref Range    Magnesium 1.8 1.6 - 2.6 mg/dL   CBC Auto Differential    Collection Time: 11/03/23  7:00 AM    Specimen: Blood   Result Value Ref Range    WBC 8.97 3.40 - 10.80  10*3/mm3    RBC 4.08 (L) 4.14 - 5.80 10*6/mm3    Hemoglobin 12.6 (L) 13.0 - 17.7 g/dL    Hematocrit 36.9 (L) 37.5 - 51.0 %    MCV 90.4 79.0 - 97.0 fL    MCH 30.9 26.6 - 33.0 pg    MCHC 34.1 31.5 - 35.7 g/dL    RDW 13.3 12.3 - 15.4 %    RDW-SD 43.3 37.0 - 54.0 fl    MPV 9.4 6.0 - 12.0 fL    Platelets 297 140 - 450 10*3/mm3    Neutrophil % 71.4 42.7 - 76.0 %    Lymphocyte % 19.0 (L) 19.6 - 45.3 %    Monocyte % 7.8 5.0 - 12.0 %    Eosinophil % 0.6 0.3 - 6.2 %    Basophil % 0.6 0.0 - 1.5 %    Immature Grans % 0.6 (H) 0.0 - 0.5 %    Neutrophils, Absolute 6.42 1.70 - 7.00 10*3/mm3    Lymphocytes, Absolute 1.70 0.70 - 3.10 10*3/mm3    Monocytes, Absolute 0.70 0.10 - 0.90 10*3/mm3    Eosinophils, Absolute 0.05 0.00 - 0.40 10*3/mm3    Basophils, Absolute 0.05 0.00 - 0.20 10*3/mm3    Immature Grans, Absolute 0.05 0.00 - 0.05 10*3/mm3    nRBC 0.0 0.0 - 0.2 /100 WBC       Radiology:  Pertinent radiology studies were reviewed as described above      Medications have been reviewed separately in chart overview      ASSESSMENT:  severe renal failure.  Prerenal, improved.  Creatinine 1.1 today which is near his baseline prior to admission  New hyponatremia, hypovolemic, improved with IV fluids  Diarrhea secondary to Salmonella, not much improvement  Salmonella enteritis with sepsis  Hypokalemia from GI losses, improved after replacement  Hypophosphatemia, replaced orally, borderline low normal today  Hypoalbuminemia, encourage nutrition as able, better  Metabolic acidosis from renal failure and lactic acidosis, worse today  History of hypertension, not on meds  Severe dehydration  Hypovolemic hyponatremia, improved with IV fluids  Proteinuria, minimal on quantification       DISCUSSION/PLAN:   Renal function, volume and electrolytes remain fairly stable today  continue oral sodium bicarb  Continue oral phosphorus replacement  Antibiotics per infectious disease    Okay to eventually transition to oral Bactrim from a renal standpoint  now that his renal failure has resolved and no acute potassium or sodium abnormalities.  Discussed with Dr. Donovan yesterday    Continue to monitor volume and electrolytes closely  Try stopping IV fluids today now that his diarrhea has improved       Hao Allen MD  [facility]   Office phone number: [phone]  Answering service phone number: [phone]    11/03/23  09:35 EDT

## 2023-11-03 NOTE — PLAN OF CARE
Goal Outcome Evaluation:  Plan of Care Reviewed With: patient        Progress: improving     VSS. BMs seem to have lessened-still diarrhea. IVF. Up ad alicia. No c/o pain. Wife at BS. Possible DC today if BMs improving.

## 2023-11-03 NOTE — PROGRESS NOTES
Name: Luisito Burton ADMIT: 10/25/2023   : 1965  PCP: Provider, No Known    MRN: [id number] LOS: 9 days   AGE/SEX: 58 y.o. male  ROOM: [facility]     Subjective   Subjective   No new events overnight.  Laying in bed.  Patient noticed gradual improvement after initiation of Pepto-Bismol.  Reports has bowel movement approximately every 2-3 hours instead of every 1-2 hours.      Review of Systems   As above  Objective   Objective   Vital Signs  Temp:  [97.3 °F (36.3 °C)-97.7 °F (36.5 °C)] 97.7 °F (36.5 °C)  Heart Rate:  [81-87] 86  Resp:  [16-20] 16  BP: (106-118)/(71-84) 108/71  SpO2:  [97 %-98 %] 97 %  on   ;   Device (Oxygen Therapy): room air  Body mass index is 28.29 kg/m².  Physical Exam    General: Alert, x3, not in distress.  HEENT: Normocephalic, atraumatic  CV: Regular rate and rhythm, no murmurs rubs or gallops  Lungs: Clear to auscultation bilaterally, no crackles or wheezes  Abdomen: Soft, nondistended, nontender  Extremities: No significant peripheral edema , no cyanosis     Results Review     I reviewed the patient's new clinical results.  Results from last 7 days   Lab Units 23  0700 23  0747 23  0656 10/31/23  0535   WBC 10*3/mm3 8.97 12.86* 11.21* 15.48*   HEMOGLOBIN g/dL 12.6* 13.5 13.7 14.0   PLATELETS 10*3/mm3 297 308 304 299     Results from last 7 days   Lab Units 23  0700 23  0747 23  0656 10/31/23  0535   SODIUM mmol/L 140 136 135* 134*   POTASSIUM mmol/L 3.6 4.0 3.6 3.8   CHLORIDE mmol/L 111* 109* 105 104   CO2 mmol/L 22.0 18.5* 21.0* 22.0   BUN mg/dL 13 14 15 17   CREATININE mg/dL 0.92 1.10 0.98 1.06   GLUCOSE mg/dL 112* 93 105* 98   Estimated Creatinine Clearance: 85.9 mL/min (by C-G formula based on SCr of 0.92 mg/dL).  Results from last 7 days   Lab Units 23  0700 23  0747 23  0656 10/31/23  0535   ALBUMIN g/dL 3.2* 3.6 3.4*  3.5 3.4*   BILIRUBIN mg/dL  --   --  0.5  --    ALK PHOS U/L  --   --  78  --    AST (SGOT) U/L  --    "--  31  --    ALT (SGPT) U/L  --   --  48*  --      Results from last 7 days   Lab Units 11/03/23  0700 11/02/23  0747 11/01/23  0656 10/31/23  0535   CALCIUM mg/dL 8.3* 8.7 9.0 8.7   ALBUMIN g/dL 3.2* 3.6 3.4*  3.5 3.4*   MAGNESIUM mg/dL 1.8 2.1 2.1 2.1   PHOSPHORUS mg/dL 2.3* 2.5 2.4* 2.4*     Results from last 7 days   Lab Units 10/27/23  1659   LACTATE mmol/L 1.5     SARS-CoV-2, TAVARES   Date Value Ref Range Status   12/07/2022 DETECTED (A) NOT DETECTED Final     Comment:       A Detected result is considered a positive test result  for COVID-19.  This indicates that RNA from SARS-CoV-2  (formerly 2019-nCoV) was detected, and the patient is  infected with the virus and presumed to be contagious.  If requested by public health authority, specimen will  be sent for additional testing.    Please review the \"Fact Sheets\" and FDA authorized  labeling available for health care providers and  patients using the following websites:  [employer]/home/Covid-19/HCP/[employer]/flu-fact-sheet.[email or web address]  [employer]/home/Covid-19/Patients/[employer]/flu-fact-  sheet.html      Please note: If a not detected (negative) Influenza A or  Influenza B is obtained, this means viral RNA was not  present in the specimen above the limit of detection. A  negative result does not rule out the possibility of  influenza and should not be used as the sole basis for  treatment or patient management decisions.  If Influenza  is still suspected, based on exposure history together  with other clinical findings, re-testing should be  considered.    This test has been authorized by the FDA under  an Emergency Use Authorization (EUA) for use by authorized  laboratories.      Due to the current public health emergency, [facility] is receiving a high volume of samples from  a wide variety of swabs and media for COVID-19 testing.  In order to serve patients during this public health  crisis, samples from appropriate clinical sources are " "  being tested. Negative test results derived from  specimens received in non-commercially manufactured  viral collection and transport media, or in media and  sample collection kits not yet authorized by FDA for  COVID-19 testing should be cautiously evaluated and the  patient potentially subjected to extra precautions such  as additional clinical monitoring, including collection  of an additional specimen.    Methodology:  Nucleic Acid Amplification Test (NAAT)  includes RT-PCR or TMA      Additional information about COVID-19 can be found  at the [employer] website:  [email or web address].    For patients with a Detected or Inconclusive test  result, please see CDC's COVID-19 Treatments and   Medications page located at   https://www.cdc.gov/coronavirus/2019-ncov/your-health/treatments-for-  severe-illness.html for information on COVID-19 therapeutics.    For patients with a Not Detected test result, please see CDC's  Vaccines for COVID-19 page located at  https://www.cdc.gov/coronavirus/2019-ncov/vaccines/index.html  for information on COVID-19 vaccines.     No results found for: \"HGBA1C\", \"POCGLU\"        CT Abdomen Pelvis Without Contrast  Narrative: Examination: CT of the abdomen and pelvis without contrast     Technique: CT of the abdomen and pelvis without contrast per protocol.  Radiation dose reduction techniques were utilized, including automated  exposure control and exposure modulation based on body size.        History: Abdominal infection     Comparison: None available     Findings: Limited evaluation demonstrates atelectasis and scarring at  the lung bases, without consolidation, pleural effusion, or thorax. The  heart is normal in size, without pericardial effusion.     There is hepatic steatosis. Duodenal diverticulum is seen. A large  amount of fluid is seen in the colon. Colonic diverticula are seen with  fat stranding around diverticula near the sigmoid and left " colon  junction..     The gallbladder, spleen, adrenal glands, kidneys, pancreas, stomach,  small bowel, appendix, urinary bladder, and abdominal vasculature are  normal as evaluated on this noncontrast lesion. No intraperitoneal fluid  collection or free gas are seen. No enlarged lymph nodes are  demonstrated.     Bone windows demonstrate degenerative changes, without suspicious  osseous lesion seen.     Impression: FINDINGS most compatible with mild uncomplicated  diverticulitis of the sigmoid/left colon junction with fluid in the  colon.     This report was finalized on 11/1/2023 8:10 AM by Dr. Guru Donahue M.D  on Workstation: [id number]       Scheduled Medications  bismuth subsalicylate, 524 mg, Oral, Q6H  cefTRIAXone, 2,000 mg, Intravenous, Q24H  enoxaparin, 40 mg, Subcutaneous, Q24H  loperamide, 4 mg, Oral, 4x Daily  pantoprazole, 40 mg, Oral, BID AC  potassium & sodium phosphates, 2 packet, Oral, BID AC  sodium bicarbonate, 650 mg, Oral, TID  sodium chloride, 10 mL, Intravenous, Q12H    Infusions  sodium chloride, 75 mL/hr, Last Rate: 75 mL/hr (11/02/23 2303)    Diet  Diet: Gastrointestinal Diets; Lactose-Controlled; Texture: Regular Texture (IDDSI 7); Fluid Consistency: Thin (IDDSI 0)    I have personally reviewed     [x]  Laboratory   []  Microbiology   []  Radiology   []  EKG/Telemetry  []  Cardiology/Vascular   []  Pathology    []  Records       Assessment/Plan     Active Hospital Problems    Diagnosis  POA    **ARF (acute renal failure) [N17.9]  Yes    Salmonella gastroenteritis [A02.0]  Yes    Salmonella bacteremia [R78.81]  Unknown    Nausea and vomiting [R11.2]  Yes    Dehydration [E86.0]  Yes    Diarrhea [R19.7]  Yes      Resolved Hospital Problems   No resolved problems to display.     Salmonella bacteremia secondary to salmonella enteritis   -Resolved nausea and vomiting.    -Continues with significant diarrhea.    -Continue cholestyramine, a  -Repeat blood cultures 10/28/2023 negative to  date  -Continue IV fluids  Continue IV ceftriaxone, ID managing  -GI evaluated, recommend follow-up outpatient in 4 weeks to schedule colonoscopy,  -C. difficile 10/31/23 negative  -Repeat CT abdomen 11/01/2023-A large amount of fluid is seen in the colon, findings compatible with mild uncomplicated diverticulitis of the sigmoid/left colon junction with fluid in the  colon.  -Discussed with , findings unlikely diverticulitis.  Recommended total of 14-day course of antibiotics since he did blood cultures from 10/28/2023, Bactrim double stranded 1 tab twice daily at discharge.  -Continue loperamide, Pepto-Bismol.  Diarrhea gradually improving.    Hypophosphatemia: 2.3 this morning.  Continue p.o. replacement.      Acute kidney failure/hypokalemia/hypomagnesemia/metabolic acidosis mostly secondary to prerenal azotemia leading to hypovolemia and subsequent acute tubular necrosis.    Creatinine stable today.  Continue IV fluids.  Nephrology on board.      Hyponatremia: Resolved.  Continue IV fluids in the setting of ongoing oliguria.          Prediabetes.  Diet at discharge.            Change to Lovenox or DVT prophylaxis.  Full code.  Discussed with patient  Discussed in multidisciplinary rounds with nursing staff, CCP, pharmacy  Anticipate discharge home, likely tomorrow if diarrhea continues to improve      Copied text in this note has been reviewed and is accurate as of 11/03/23.         Dictated utilizing Dragon dictation        Karina Antonio MD  [facility]  11/03/23  10:21 EDT

## 2023-11-03 NOTE — PLAN OF CARE
"Goal Outcome Evaluation:           Progress: improving  Outcome Evaluation: pt episodes of diarrhea has lessen, is refusing imodium with PM dose, states that he is affraid that he is going to get \"constipated now\". No other issues noted, will cont to monitor.         "

## 2023-11-03 NOTE — PROGRESS NOTES
Continued Stay Note  [facility]     Patient Name: Luisito Burton  MRN: [id number]  Today's Date: 11/3/2023    Admit Date: 10/25/2023    Plan: Home   Discharge Plan       Row Name 11/03/23 1304       Plan    Plan Home    Plan Comments Plan remains to return home with family support. No needs at this time.                   Discharge Codes    No documentation.                 Expected Discharge Date and Time       Expected Discharge Date Expected Discharge Time    Nov 5, 2023               Delores Greene RN

## 2023-11-03 NOTE — PROGRESS NOTES
Gastroenterology   Inpatient Progress Note    Reason for Follow Up:  Salmonella gastroenteritis     Subjective  Interval History:   Bowel movements are decreasing in frequency after additional of Pepto Bismol.  Denies fever/chills or abdominal pain. Tolerating diet without issue.     WBC 8.97.       Current Facility-Administered Medications:     acetaminophen (TYLENOL) tablet 650 mg, 650 mg, Oral, Q4H PRN, 650 mg at 10/25/23 2043 **OR** acetaminophen (TYLENOL) 160 MG/5ML oral solution 650 mg, 650 mg, Oral, Q4H PRN **OR** acetaminophen (TYLENOL) suppository 650 mg, 650 mg, Rectal, Q4H PRN, Benjy Guevara MD    bismuth subsalicylate (PEPTO BISMOL) chewable tablet 524 mg, 524 mg, Oral, Q6H, Karina Antonio MD, 524 mg at 11/03/23 1007    cefTRIAXone (ROCEPHIN) 2,000 mg in sodium chloride 0.9 % 100 mL IVPB-VTB, 2,000 mg, Intravenous, Q24H, Karina Antonio MD, Last Rate: 200 mL/hr at 11/03/23 1238, 2,000 mg at 11/03/23 1238    Enoxaparin Sodium (LOVENOX) syringe 40 mg, 40 mg, Subcutaneous, Q24H, Karina Antonio MD, 40 mg at 11/03/23 1238    influenza vac split quad (FLUZONE,FLUARIX,AFLURIA,FLULAVAL) injection 0.5 mL, 0.5 mL, Intramuscular, During Hospitalization, Corie West MD    loperamide (IMODIUM) capsule 4 mg, 4 mg, Oral, 4x Daily, Karina Antonio MD, 4 mg at 11/03/23 1007    nitroglycerin (NITROSTAT) SL tablet 0.4 mg, 0.4 mg, Sublingual, Q5 Min PRN, Benjy Guevara MD    ondansetron (ZOFRAN) tablet 4 mg, 4 mg, Oral, Q6H PRN **OR** ondansetron (ZOFRAN) injection 4 mg, 4 mg, Intravenous, Q6H PRN, Benjy Guevara MD    pantoprazole (PROTONIX) EC tablet 40 mg, 40 mg, Oral, BID AC, Nemo Chu APRN, 40 mg at 11/03/23 0624    potassium & sodium phosphates (PHOS-NAK) 280-160-250 MG packet 2 packet, 2 packet, Oral, BID AC, Hao Allen MD, 2 packet at 11/03/23 1026    potassium chloride (K-DUR,KLOR-CON) ER tablet 40 mEq, 40 mEq, Oral, Once, Hao Allen MD     sodium bicarbonate tablet 650 mg, 650 mg, Oral, TID, Hao Allen MD, 650 mg at 11/03/23 1007    sodium chloride 0.9 % flush 10 mL, 10 mL, Intravenous, Q12H, Benjy Guevara MD, 10 mL at 11/03/23 1026    sodium chloride 0.9 % flush 10 mL, 10 mL, Intravenous, PRN, Benjy Guevara MD    sodium chloride 0.9 % infusion 40 mL, 40 mL, Intravenous, PRN, Benjy Guevara MD  Review of Systems:               All systems were reviewed and negative except for:  Constitution:  positive for See HPI  Gastrointestinal: positive for  diarrhea    Objective     Vital Signs  Temp:  [97.3 °F (36.3 °C)-97.7 °F (36.5 °C)] 97.7 °F (36.5 °C)  Heart Rate:  [81-87] 86  Resp:  [16-20] 16  BP: (106-118)/(71-84) 108/71  Body mass index is 28.29 kg/m².                  General Appearance:  awake, alert, oriented, in no acute distress  Abdomen:  Soft, non-tender, normal bowel sounds; no bruits, organomegaly or masses.                Results Review:                I reviewed the patient's new clinical results.    Results from last 7 days   Lab Units 11/03/23  0700 11/02/23  0747 11/01/23  0656   WBC 10*3/mm3 8.97 12.86* 11.21*   HEMOGLOBIN g/dL 12.6* 13.5 13.7   HEMATOCRIT % 36.9* 39.0 39.0   PLATELETS 10*3/mm3 297 308 304     Results from last 7 days   Lab Units 11/03/23  0700 11/02/23  0747 11/01/23  0656   SODIUM mmol/L 140 136 135*   POTASSIUM mmol/L 3.6 4.0 3.6   CHLORIDE mmol/L 111* 109* 105   CO2 mmol/L 22.0 18.5* 21.0*   BUN mg/dL 13 14 15   CREATININE mg/dL 0.92 1.10 0.98   CALCIUM mg/dL 8.3* 8.7 9.0   BILIRUBIN mg/dL  --   --  0.5   ALK PHOS U/L  --   --  78   ALT (SGPT) U/L  --   --  48*   AST (SGOT) U/L  --   --  31   GLUCOSE mg/dL 112* 93 105*         Lab Results   Lab Value Date/Time    LIPASE 32 10/25/2023 1318       Radiology:  CT Abdomen Pelvis Without Contrast   Final Result   FINDINGS most compatible with mild uncomplicated   diverticulitis of the sigmoid/left colon junction with fluid in the   colon.        This report was finalized on 11/1/2023 8:10 AM by Dr. Guru Donahue M.D   on Workstation: [id number]          XR Abdomen KUB   Final Result          Assessment & Plan     Active Hospital Problems    Diagnosis     **ARF (acute renal failure)     Salmonella gastroenteritis     Salmonella bacteremia     Nausea and vomiting     Dehydration     Diarrhea        Assessment:  Diarrhea  Nausea and vomiting resolved  Abdominal pain resolved  Gram-negative bacteremia  Salmonella gastroenteritis      Plan:    Nursing staff to notify GI service on call if any change in clinical status.  Appreciate antibiotic recommendations per infectious disease  Continue Imodium and Pepto-Bismol in hopes of improving stool frequency  Start Banatrol supplementation twice daily for 1 week followed by once daily dosing for an additional week.  This can be started following discharge from inpatient setting.  Follow low residue diet. Instructions provided.   He will ultimately need outpatient follow-up in 4 weeks after discharge from inpatient setting to discuss timing of colonoscopy with hopes of completing prior to the end of the year.  Colonoscopy will be for screening purposes.      I discussed the patients findings and my recommendations with patient and family.          TANNER Kaminski  [facility] [address] [address]

## 2023-11-03 NOTE — PROGRESS NOTES
"  Infectious Diseases Progress Note    Robin Donovan MD     [facility]  [place]: 9 days  Patient Identification:  Name: Luisito Burton  Age: 58 y.o.  Sex: male  :  1965  MRN: [id number]         Primary Care Physician: Provider, No Known        Subjective: Feeling better wants to see how he does after his IV fluids stopped as nephrology service recommended to observe his renal function off of IV fluids and if he continues to do well hopefully can be discharged next 24 to 48 hours.  Still have diarrhea but not as frequent.  Denies any fever and chills.  Interval History: See consultation note.    Objective:    Scheduled Meds:bismuth subsalicylate, 524 mg, Oral, Q6H  cefTRIAXone, 2,000 mg, Intravenous, Q24H  enoxaparin, 40 mg, Subcutaneous, Q24H  loperamide, 4 mg, Oral, 4x Daily  pantoprazole, 40 mg, Oral, BID AC  potassium & sodium phosphates, 2 packet, Oral, BID AC  sodium bicarbonate, 650 mg, Oral, TID  sodium chloride, 10 mL, Intravenous, Q12H      Continuous Infusions:sodium chloride, 75 mL/hr, Last Rate: 75 mL/hr (23 2303)          Vital signs in last 24 hours:  Temp:  [97.3 °F (36.3 °C)-97.7 °F (36.5 °C)] 97.7 °F (36.5 °C)  Heart Rate:  [81-87] 86  Resp:  [16-20] 16  BP: (106-118)/(71-84) 108/71    Intake/Output:    Intake/Output Summary (Last 24 hours) at 11/3/2023 1158  Last data filed at 11/3/2023 0625  Gross per 24 hour   Intake 1460 ml   Output --   Net 1460 ml       Exam:  /71 (BP Location: Right arm, Patient Position: Lying)   Pulse 86   Temp 97.7 °F (36.5 °C) (Oral)   Resp 16   Ht 165.1 cm (65\")   Wt 77.1 kg (170 lb)   SpO2 97%   BMI 28.29 kg/m²   Patient is examined using the personal protective equipment as per guidelines from infection control for this particular patient as enacted.  Hand washing was performed before and after patient interaction.  General Appearance:    Alert, cooperative, no distress, AAOx3                          Head:    Normocephalic, " without obvious abnormality, atraumatic                           Eyes:    PERRL, conjunctivae/corneas clear, EOM's intact, both eyes                         Throat:   Lips, tongue, gums normal; oral mucosa pink and moist                           Neck:   Supple, symmetrical, trachea midline, no JVD                         Lungs:    Clear to auscultation bilaterally, respirations unlabored                 Chest Wall:    No tenderness or deformity                          Heart:  S1-S2 regular                  Abdomen:   Soft nontender                 Extremities:   Extremities normal, atraumatic, no cyanosis or edema                        Pulses:   Pulses palpable in all extremities                            Skin:   Skin is warm and dry,  no rashes or palpable lesions                  Neurologic: Alert and oriented and grossly nonfocal    Data Review:    I reviewed the patient's new clinical results.  Results from last 7 days   Lab Units 11/03/23  0700 11/02/23  0747 11/01/23  0656 10/31/23  0535 10/30/23  0556 10/29/23  0715 10/28/23  0631   WBC 10*3/mm3 8.97 12.86* 11.21* 15.48* 12.84* 9.76 8.07   HEMOGLOBIN g/dL 12.6* 13.5 13.7 14.0 14.8 14.1 14.7   PLATELETS 10*3/mm3 297 308 304 299 295 241 228     Results from last 7 days   Lab Units 11/03/23  0700 11/02/23  0747 11/01/23  0656 10/31/23  0535 10/30/23  0556 10/29/23  0715 10/28/23  0631   SODIUM mmol/L 140 136 135* 134* 131* 136 135*   POTASSIUM mmol/L 3.6 4.0 3.6 3.8 3.6 3.5 3.0*   CHLORIDE mmol/L 111* 109* 105 104 96* 101 97*   CO2 mmol/L 22.0 18.5* 21.0* 22.0 24.0 24.0 26.3   BUN mg/dL 13 14 15 17 18 14 16   CREATININE mg/dL 0.92 1.10 0.98 1.06 1.17 0.88 0.87   CALCIUM mg/dL 8.3* 8.7 9.0 8.7 9.6 9.1 9.2   GLUCOSE mg/dL 112* 93 105* 98 99 100* 98     Microbiology Results (last 10 days)       Procedure Component Value - Date/Time    Clostridioides difficile Toxin - Stool, Per Rectum [[id number]]  (Normal) Collected: 10/31/23 1032    Lab Status: Final result  Specimen: Stool from Per Rectum Updated: 10/31/23 1127    Narrative:      The following orders were created for panel order Clostridioides difficile Toxin - Stool, Per Rectum.  Procedure                               Abnormality         Status                     ---------                               -----------         ------                     Clostridioides difficile...[[id number]]  Normal              Final result                 Please view results for these tests on the individual orders.    Clostridioides difficile Toxin, PCR - Stool, Per Rectum [[id number]]  (Normal) Collected: 10/31/23 1032    Lab Status: Final result Specimen: Stool from Per Rectum Updated: 10/31/23 1127     Toxigenic C. difficile by PCR Negative    Narrative:      The result indicates the absence of toxigenic C. difficile from stool specimen.     Blood Culture - Blood, Hand, Left [[id number]]  (Normal) Collected: 10/28/23 0723    Lab Status: Final result Specimen: Blood from Hand, Left Updated: 11/02/23 0746     Blood Culture No growth at 5 days    Blood Culture - Blood, Hand, Right [[id number]]  (Normal) Collected: 10/28/23 0716    Lab Status: Final result Specimen: Blood from Hand, Right Updated: 11/02/23 0746     Blood Culture No growth at 5 days    Blood Culture - Blood, Arm, Right [[id number]]  (Abnormal)  (Susceptibility) Collected: 10/26/23 2112    Lab Status: Final result Specimen: Blood from Arm, Right Updated: 10/30/23 0924     Blood Culture Salmonella group     Isolated from Aerobic and Anaerobic Bottles     Gram Stain Anaerobic Bottle Gram negative bacilli      Aerobic Bottle Gram negative bacilli    Narrative:      Refer to targeted stool culture collected on 10/25/2023 2007 for [facility] results.    Susceptibility        Salmonella group      KAREL Not Specified      Ampicillin Susceptible       Ceftriaxone Susceptible       Ciprofloxacin  Susceptible      Levofloxacin Intermediate       Trimethoprim + Sulfamethoxazole  Susceptible                          Susceptibility Comments       Salmonella group    Cefazolin sensitivity will not be reported for Enterobacteriaceae in non-urine isolates. If cefazolin is preferred, please call the microbiology lab to request an E-test.  With the exception of urinary-sourced infections, aminoglycosides should not be used as monotherapy.  Ciprofloxacin sensitivity will not be automatically reported for extra-intestinal isolates. Ciprofloxacin performed by E-test   Salmonella and Shigella spp. may appear active in vitro to 1st/2nd generation cephalosporins/cephamycins and aminoglycosides but are not effective clinically.               Blood Culture - Blood, Arm, Right [[id number]]  (Abnormal) Collected: 10/26/23 2112    Lab Status: Final result Specimen: Blood from Arm, Right Updated: 10/30/23 0925     Blood Culture Salmonella group     Isolated from Aerobic and Anaerobic Bottles     Gram Stain Aerobic Bottle Gram negative bacilli      Anaerobic Bottle Gram negative bacilli    Narrative:      Refer to previous blood culture collected on 10/26/2023 2112 for MICs    Refer to previous targeted stool culture collected on 10/25/2023 2007 for [place] [facility] results.      Blood Culture ID, PCR - Blood, Arm, Right [[id number]]  (Abnormal) Collected: 10/26/23 2112    Lab Status: Final result Specimen: Blood from Arm, Right Updated: 10/27/23 1406     BCID, PCR Salmonella spp. Identification by BCID2 PCR     BOTTLE TYPE Anaerobic Bottle    Narrative:      No resistance genes detected.    Gastrointestinal Panel, PCR - Stool, Per Rectum [[id number]]  (Abnormal) Collected: 10/25/23 2007    Lab Status: Final result Specimen: Stool from Per Rectum Updated: 10/25/23 2214     Campylobacter Not Detected     Plesiomonas shigelloides Not Detected     Salmonella Detected     Vibrio Not Detected     Vibrio cholerae Not Detected     Yersinia enterocolitica Not Detected     Enteroaggregative E. coli (EAEC) Not Detected      Enteropathogenic E. coli (EPEC) Not Detected     Enterotoxigenic E. coli (ETEC) lt/st Not Detected     Shiga-like toxin-producing E. coli (STEC) stx1/stx2 Not Detected     Shigella/Enteroinvasive E. coli (EIEC) Not Detected     Cryptosporidium Not Detected     Cyclospora cayetanensis Not Detected     Entamoeba histolytica Not Detected     Giardia lamblia Not Detected     Adenovirus F40/41 Not Detected     Astrovirus Not Detected     Norovirus GI/GII Not Detected     Rotavirus A Not Detected     Sapovirus (I, II, IV or V) Not Detected    Stool Culture, Targeted - Stool, Per Rectum [[id number]]  (Abnormal) Collected: 10/25/23 2007    Lab Status: Preliminary result Specimen: Stool from Per Rectum Updated: 10/30/23 0922     Stool Culture Salmonella group    Narrative:      Sent to [facility] for confirmation               Assessment:    ARF (acute renal failure)    Diarrhea    Nausea and vomiting    Dehydration    Salmonella gastroenteritis    Salmonella bacteremia  1-bacteremic Salmonella gastroenteritis of nontypeable type likely due to contaminated/infected farm eggs stored on room temperature for few days prior to consumption 3 to 4 days prior to onset of the symptoms-overall significant improvement with supportive care  2-severe metabolic compromise with dehydration and acute renal failure due to severe bacteremic gastroenteritis overall improved with supportive care  3-progressive leukocytosis etiology unclear-stool studies are negative for C. difficile as of 10/31/2023.        Recommendations/Discussions:  See my discussion and recommendation on 10/27 and 10/29/2023.  Continue with IV ceftriaxone while he is here  Reason for slight elevation in white blood cell count and subsequent improvement is unclear  Discussed with nephrology service and patient is cleared to have oral Bactrim at the time of discharge to complete 2 weeks of treatment.  Care plan and concept of care discussed with Dr. Antonio as well as with  patient and his wife.  Robin Donovan MD  11/3/2023  11:58 EDT    Parts of this note may be an electronic transcription/translation of spoken language to printed text using the Dragon dictation system.

## 2023-11-03 NOTE — NURSING NOTE
Spoke with Dr. Parekh about pt's worsening CBC. She asked me to have Dr. Patterson with cardiology call her tomorrow to discuss if patient can be off tele and be transferred to  for treatment. Will pass along info to dayshift AM to make sure Dr. Patterson is notified to call her. Will place neutropenic precautions sign and masks at door.

## 2023-11-03 NOTE — DISCHARGE INSTRUCTIONS
For Diarrhea we recommend starting a prebiotic supplement called Banatrol plus which can be found on [employer]- this can be started upon arrival home    Begin taking 1 scoop of Banatrol plus with 4 ounces of water or juice twice a day until symptoms improve or after 7 days, then begin taking 1 scoop once daily.  Banatrol plus can also be mixed in oatmeal or applesauce  Do not take more than 6 scoops per day or more than 2 scoops at a single time  It is okay to use long-term  Banatrol plus works by solidifying stools through absorbing excess water in the colon while feeling the growth of good bacteria in your gut and helping to reduce inflammation.  This is also helpful in promoting bowel regularity.  Do not take if you allergic to latex or fruits bananas, avocados, passionfruit, plums, strawberries, and tomatoes          Low Residue Diet until diarrhea resolves:    A low residue diet will decrease the amount and slow the movement of stool in the intestines. This may prevent blockage.     Many people think that a low residue diet and a low fiber diet are the same thing. Although they are similar, they are not exactly the same. Fiber is that part of plant foods that is not completely digested in the colon and contributes to stool. Residue includes that undigested fiber and any other food materials remaining in the colon after digestion that may increase stool output. A low fiber diet contains less than 10 grams of fiber per day and limits those foods known to increase the amount of stool. However, some low-fiber foods, such as milk, can actually increase residue or stimulate bowel movement. For that reason, a low residue diet has more restrictions than a low fiber diet.     When appropriate food choices are made, a low residue diet will provide the Recommended Dietary Allowances (RDAs). However, long-term use of a low residue diet may not provide the needed amounts of vitamin C or folic acid. If you must stay on this  diet for a long period, a multivitamin or mineral supplement may be necessary. Check with your doctor or dietician for their recommendations.     This diet gives you a good variety of foods so it should not become tiresome. High-fiber or high-roughage foods are not used. Foods with some fiber in them (fruits and vegetables) should be well cooked. Milk does not have any fiber that you can see. Milk, however, does leave some residue in your colon after it is digested. This is why milk is limited to two cups a day.

## 2023-11-04 ENCOUNTER — READMISSION MANAGEMENT (OUTPATIENT)
Dept: CALL CENTER | Facility: HOSPITAL | Age: 58
End: 2023-11-04
Payer: COMMERCIAL

## 2023-11-04 VITALS
RESPIRATION RATE: 20 BRPM | BODY MASS INDEX: 28.32 KG/M2 | TEMPERATURE: 97.7 F | WEIGHT: 170 LBS | OXYGEN SATURATION: 100 % | HEART RATE: 67 BPM | HEIGHT: 65 IN | SYSTOLIC BLOOD PRESSURE: 139 MMHG | DIASTOLIC BLOOD PRESSURE: 90 MMHG

## 2023-11-04 LAB
ALBUMIN SERPL-MCNC: 3.6 G/DL (ref 3.5–5.2)
ANION GAP SERPL CALCULATED.3IONS-SCNC: 8.1 MMOL/L (ref 5–15)
BASOPHILS # BLD MANUAL: 0.18 10*3/MM3 (ref 0–0.2)
BASOPHILS NFR BLD MANUAL: 2 % (ref 0–1.5)
BUN SERPL-MCNC: 9 MG/DL (ref 6–20)
BUN/CREAT SERPL: 11.1 (ref 7–25)
CALCIUM SPEC-SCNC: 9.1 MG/DL (ref 8.6–10.5)
CHLORIDE SERPL-SCNC: 106 MMOL/L (ref 98–107)
CO2 SERPL-SCNC: 23.9 MMOL/L (ref 22–29)
CREAT SERPL-MCNC: 0.81 MG/DL (ref 0.76–1.27)
DEPRECATED RDW RBC AUTO: 42.9 FL (ref 37–54)
EGFRCR SERPLBLD CKD-EPI 2021: 103.5 ML/MIN/1.73
EOSINOPHIL # BLD MANUAL: 0.09 10*3/MM3 (ref 0–0.4)
EOSINOPHIL NFR BLD MANUAL: 1 % (ref 0.3–6.2)
ERYTHROCYTE [DISTWIDTH] IN BLOOD BY AUTOMATED COUNT: 13.2 % (ref 12.3–15.4)
GLUCOSE SERPL-MCNC: 92 MG/DL (ref 65–99)
HCT VFR BLD AUTO: 39 % (ref 37.5–51)
HGB BLD-MCNC: 13.5 G/DL (ref 13–17.7)
LYMPHOCYTES # BLD MANUAL: 1.39 10*3/MM3 (ref 0.7–3.1)
LYMPHOCYTES NFR BLD MANUAL: 6.1 % (ref 5–12)
MAGNESIUM SERPL-MCNC: 1.9 MG/DL (ref 1.6–2.6)
MCH RBC QN AUTO: 30.7 PG (ref 26.6–33)
MCHC RBC AUTO-ENTMCNC: 34.6 G/DL (ref 31.5–35.7)
MCV RBC AUTO: 88.6 FL (ref 79–97)
MONOCYTES # BLD: 0.56 10*3/MM3 (ref 0.1–0.9)
NEUTROPHILS # BLD AUTO: 6.94 10*3/MM3 (ref 1.7–7)
NEUTROPHILS NFR BLD MANUAL: 75.8 % (ref 42.7–76)
PHOSPHATE SERPL-MCNC: 2.5 MG/DL (ref 2.5–4.5)
PLAT MORPH BLD: NORMAL
PLATELET # BLD AUTO: 329 10*3/MM3 (ref 140–450)
PMV BLD AUTO: 9.1 FL (ref 6–12)
POTASSIUM SERPL-SCNC: 3.9 MMOL/L (ref 3.5–5.2)
RBC # BLD AUTO: 4.4 10*6/MM3 (ref 4.14–5.8)
RBC MORPH BLD: NORMAL
SODIUM SERPL-SCNC: 138 MMOL/L (ref 136–145)
VARIANT LYMPHS NFR BLD MANUAL: 15.2 % (ref 19.6–45.3)
WBC MORPH BLD: NORMAL
WBC NRBC COR # BLD: 9.16 10*3/MM3 (ref 3.4–10.8)

## 2023-11-04 PROCEDURE — 83735 ASSAY OF MAGNESIUM: CPT | Performed by: INTERNAL MEDICINE

## 2023-11-04 PROCEDURE — 25010000002 CEFTRIAXONE PER 250 MG: Performed by: STUDENT IN AN ORGANIZED HEALTH CARE EDUCATION/TRAINING PROGRAM

## 2023-11-04 PROCEDURE — 85007 BL SMEAR W/DIFF WBC COUNT: CPT | Performed by: INTERNAL MEDICINE

## 2023-11-04 PROCEDURE — 99232 SBSQ HOSP IP/OBS MODERATE 35: CPT | Performed by: INTERNAL MEDICINE

## 2023-11-04 PROCEDURE — 80069 RENAL FUNCTION PANEL: CPT | Performed by: INTERNAL MEDICINE

## 2023-11-04 PROCEDURE — 85025 COMPLETE CBC W/AUTO DIFF WBC: CPT | Performed by: INTERNAL MEDICINE

## 2023-11-04 RX ORDER — BISMUTH SUBSALICYLATE 262 MG/1
524 TABLET, CHEWABLE ORAL EVERY 6 HOURS
Qty: 56 TABLET | Refills: 0 | Status: SHIPPED | OUTPATIENT
Start: 2023-11-04 | End: 2023-11-11

## 2023-11-04 RX ORDER — LOPERAMIDE HYDROCHLORIDE 2 MG/1
4 CAPSULE ORAL 4 TIMES DAILY PRN
Qty: 56 CAPSULE | Refills: 0 | Status: SHIPPED | OUTPATIENT
Start: 2023-11-04 | End: 2023-11-11

## 2023-11-04 RX ORDER — SULFAMETHOXAZOLE AND TRIMETHOPRIM 800; 160 MG/1; MG/1
1 TABLET ORAL 2 TIMES DAILY
Qty: 12 TABLET | Refills: 0 | Status: SHIPPED | OUTPATIENT
Start: 2023-11-05 | End: 2023-11-11

## 2023-11-04 RX ADMIN — LOPERAMIDE HYDROCHLORIDE 4 MG: 2 CAPSULE ORAL at 09:30

## 2023-11-04 RX ADMIN — Medication 524 MG: at 02:11

## 2023-11-04 RX ADMIN — SODIUM BICARBONATE 650 MG: 650 TABLET ORAL at 09:30

## 2023-11-04 RX ADMIN — Medication 2 PACKET: at 06:36

## 2023-11-04 RX ADMIN — CEFTRIAXONE 2000 MG: 2 INJECTION, POWDER, FOR SOLUTION INTRAMUSCULAR; INTRAVENOUS at 12:31

## 2023-11-04 RX ADMIN — Medication 2 PACKET: at 09:34

## 2023-11-04 RX ADMIN — Medication 10 ML: at 09:31

## 2023-11-04 RX ADMIN — Medication 524 MG: at 09:31

## 2023-11-04 RX ADMIN — PANTOPRAZOLE SODIUM 40 MG: 40 TABLET, DELAYED RELEASE ORAL at 06:36

## 2023-11-04 NOTE — CASE MANAGEMENT/SOCIAL WORK
Case Management Discharge Note      Final Note: dc home    Provided Post Acute Provider List?: N/A  Provided Post Acute Provider Quality & Resource List?: N/A    Selected Continued Care - Discharged on 11/4/2023 Admission date: 10/25/2023 - Discharge disposition: Home or Self Care      Destination    No services have been selected for the patient.                Durable Medical Equipment    No services have been selected for the patient.                Dialysis/Infusion    No services have been selected for the patient.                Home Medical Care    No services have been selected for the patient.                Therapy    No services have been selected for the patient.                Community Resources    No services have been selected for the patient.                Community & DME    No services have been selected for the patient.                         Final Discharge Disposition Code: 01 - home or self-care

## 2023-11-04 NOTE — DISCHARGE SUMMARY
Patient Name: Luisito Burton  : 1965  MRN: [id number]    Date of Admission: 10/25/2023  Date of Discharge:  2023  Primary Care Physician: Provider, No Known      Chief Complaint:   Nausea, Vomiting, and Diarrhea      Discharge Diagnoses     Active Hospital Problems    Diagnosis  POA    **ARF (acute renal failure) [N17.9]  Yes    Salmonella gastroenteritis [A02.0]  Yes    Salmonella bacteremia [R78.81]  Unknown    Nausea and vomiting [R11.2]  Yes    Dehydration [E86.0]  Yes    Diarrhea [R19.7]  Yes      Resolved Hospital Problems   No resolved problems to display.        Hospital Course   Mr. Burton is a 58 y.o. who presents to [facility] complaining of diarrhea for about 4 days. He reports frequent nonbloody diarrhea starting . He then developed nausea and vomiting and was not able to keep food or liquids down due to the nausea. He received zofran as outpatient and this helped the nausea but then he began to have muscle cramping. The cramping was whole body but mostly in his legs and feet.  Patient was diagnosed with MILAGRO and Salmonella enteritis.        Salmonella bacteremia secondary to salmonella enteritis/diarrhea.  Gastrointestinal panel and blood cultures on admission came back positive for Salmonella.  Patient was initiated on IV fluids, antiemetics, IV ceftriaxone, infectious disease was consulted  -Repeat blood cultures 10/28/2023 were negative.  Patient nausea, vomiting, abdominal pain resolved after several days, however patient continued to have significant diarrhea.  Was initiated on high-dose loperamide, Lomotil, Pepto-Bismol, and GI was consulted.  Diarrhea gradually improved and had approximately 9 BMs in 24 hours prior to discharge.  Of note however patient at baseline 3 bowel movements per day.  Discharged home on as needed loperamide, continue Pepto-Bismol, and also Banatrol supplementation twice daily for a week followed by once daily for a week per GI  recommendations.  Spouse had already ordered banatrol from the [employer] prior to DC.  Patient was discharged on Bactrim DS 1 tab twice daily for 6 days starting 10/05/2023 to complete 14-day course of antibiotics on 10/10/2023 per infectious disease recommendations.  Will need CMP in 3-4 days to monitor renal function and liver function while on Bactrim.  Discussed with patient to stay hydrated to decrease risk of MILAGRO.     Acute kidney failure/hypokalemia/hypomagnesemia/metabolic acidosis mostly secondary to prerenal azotemia leading to hypovolemia and subsequent acute tubular necrosis.    Secondary to above, nephrology was consulted, MILAGRO resolved with IV fluids.  Discussed with patient stay hydrated as continues to have diarrhea.  Recommend to follow-up with  PCP and repeat labs in 3 days to monitor renal function.          At the time of discharge patient was told to take all medications as prescribed, keep all follow-up appointments, and call their doctor or return to the hospital with any worsening or concerning symptoms.              Day of Discharge     Subjective:  Patient was seen this morning, patient was walking in the room.   room, reports continued improvement of diarrhea, had approximately 9 bowel movements in 24 hours, decreased compared to 13-14 BMs prior.  Denies abdominal pain, nausea or vomiting.  Reports good appetite.    Physical Exam:  Temp:  [97.3 °F (36.3 °C)-98.2 °F (36.8 °C)] 97.3 °F (36.3 °C)  Heart Rate:  [73-84] 73  Resp:  [18-22] 20  BP: (106-125)/(75-89) 107/80  Body mass index is 28.29 kg/m².  Physical Exam    General: Alert and oriented x3, no acute distress  HEENT: Normocephalic, atraumatic  CV: Regular rate and rhythm, no murmurs rubs or gallops  Lungs: Clear to auscultation bilaterally, no crackles or wheezes  Abdomen: Soft, nontender, nondistended  Extremities: No significant peripheral edema , no cyanosis     Consultants     Consult Orders (all) (From admission, onward)       Start      Ordered    10/31/23 0827  Inpatient Gastroenterology Consult  Once        Specialty:  Gastroenterology  Provider:  Cuong Arias MD    10/31/23 0826    10/29/23 0351  Inpatient Case Management  Consult  Once        Provider:  (Not yet assigned)    10/29/23 0350    10/27/23 1254  Inpatient Infectious Diseases Consult  Once        Specialty:  Infectious Diseases  Provider:  Robin Donovan MD    10/27/23 1254    10/25/23 1805  Inpatient Nephrology Consult  Once        Specialty:  Nephrology  Provider:  Hao Allen MD    10/25/23 1810                  Procedures     * Surgery not found *      Imaging Results (All)       Procedure Component Value Units Date/Time    CT Abdomen Pelvis Without Contrast [[id number]] Collected: 11/01/23 0806     Updated: 11/01/23 0813    Narrative:      Examination: CT of the abdomen and pelvis without contrast     Technique: CT of the abdomen and pelvis without contrast per protocol.  Radiation dose reduction techniques were utilized, including automated  exposure control and exposure modulation based on body size.        History: Abdominal infection     Comparison: None available     Findings: Limited evaluation demonstrates atelectasis and scarring at  the lung bases, without consolidation, pleural effusion, or thorax. The  heart is normal in size, without pericardial effusion.     There is hepatic steatosis. Duodenal diverticulum is seen. A large  amount of fluid is seen in the colon. Colonic diverticula are seen with  fat stranding around diverticula near the sigmoid and left colon  junction..     The gallbladder, spleen, adrenal glands, kidneys, pancreas, stomach,  small bowel, appendix, urinary bladder, and abdominal vasculature are  normal as evaluated on this noncontrast lesion. No intraperitoneal fluid  collection or free gas are seen. No enlarged lymph nodes are  demonstrated.     Bone windows demonstrate degenerative changes, without  "suspicious  osseous lesion seen.       Impression:      FINDINGS most compatible with mild uncomplicated  diverticulitis of the sigmoid/left colon junction with fluid in the  colon.     This report was finalized on 11/1/2023 8:10 AM by Dr. Guru Donahue M.D  on Workstation: [id number]       XR Abdomen KUB [[id number]] Collected: 10/29/23 1527     Updated: 10/29/23 1742    Narrative:      XR ABDOMEN KUB-     HISTORY: 58-year-old male with abdominal pain and diarrhea.     FINDINGS: There is a paucity of formed stool within the colon. There is  no evidence for bowel obstruction.     This report was finalized on 10/29/2023 5:39 PM by Dr. Bre Dowling M.D  on Workstation: [id number]                 Pertinent Labs     Results from last 7 days   Lab Units 11/04/23 0757 11/03/23 0700 11/02/23  0747 11/01/23  0656   WBC 10*3/mm3 9.16 8.97 12.86* 11.21*   HEMOGLOBIN g/dL 13.5 12.6* 13.5 13.7   PLATELETS 10*3/mm3 329 297 308 304     Results from last 7 days   Lab Units 11/04/23 0757 11/03/23 0700 11/02/23  0747 11/01/23  0656   SODIUM mmol/L 138 140 136 135*   POTASSIUM mmol/L 3.9 3.6 4.0 3.6   CHLORIDE mmol/L 106 111* 109* 105   CO2 mmol/L 23.9 22.0 18.5* 21.0*   BUN mg/dL 9 13 14 15   CREATININE mg/dL 0.81 0.92 1.10 0.98   GLUCOSE mg/dL 92 112* 93 105*   Estimated Creatinine Clearance: 97.5 mL/min (by C-G formula based on SCr of 0.81 mg/dL).  Results from last 7 days   Lab Units 11/04/23 0757 11/03/23 0700 11/02/23  0747 11/01/23  0656   ALBUMIN g/dL 3.6 3.2* 3.6 3.4*  3.5   BILIRUBIN mg/dL  --   --   --  0.5   ALK PHOS U/L  --   --   --  78   AST (SGOT) U/L  --   --   --  31   ALT (SGPT) U/L  --   --   --  48*     Results from last 7 days   Lab Units 11/04/23  0757 11/03/23  0700 11/02/23  0747 11/01/23  0656   CALCIUM mg/dL 9.1 8.3* 8.7 9.0   ALBUMIN g/dL 3.6 3.2* 3.6 3.4*  3.5   MAGNESIUM mg/dL 1.9 1.8 2.1 2.1   PHOSPHORUS mg/dL 2.5 2.3* 2.5 2.4*               Invalid input(s): \"LDLCALC\"          Test Results " Pending at Discharge     Pending Labs       Order Current Status    Stool Culture, Targeted - Stool, Per Rectum Preliminary result            Discharge Details        Discharge Medications        New Medications        Instructions Start Date   bismuth subsalicylate 262 MG chewable tablet  Commonly known as: PEPTO BISMOL   524 mg, Oral, Every 6 Hours, Hold if constipated.      loperamide 2 MG capsule  Commonly known as: IMODIUM   4 mg, Oral, 4 Times Daily PRN      sulfamethoxazole-trimethoprim 800-160 MG per tablet  Commonly known as: Bactrim DS   1 tablet, Oral, 2 Times Daily   Start Date: November 5, 2023            Continue These Medications        Instructions Start Date   ondansetron 8 MG tablet  Commonly known as: ZOFRAN   8 mg, Oral, Every 8 Hours PRN               No Known Allergies    Discharge Disposition:  Home or Self Care      Discharge Diet:  Diet Order   Procedures    Diet: Gastrointestinal Diets; Lactose-Controlled; Texture: Regular Texture (IDDSI 7); Fluid Consistency: Thin (IDDSI 0)       Discharge Activity:       CODE STATUS:    Code Status and Medical Interventions:   Ordered at: 10/25/23 1811     Code Status (Patient has no pulse and is not breathing):    CPR (Attempt to Resuscitate)     Medical Interventions (Patient has pulse or is breathing):    Full Support     Comments:    plesae confirm with patient/medical decision maker       No future appointments.   Follow-up Information       Cuong Nelson MD. Schedule an appointment as soon as possible for a visit.    Specialty: Gastroenterology  Why: follow up in 4-6 weeks  Contact information:  [address] [place] [id number] [phone]               Provider, No Known Follow up in 1 week(s).    Why: Follow-up with PCP in 3 days, will need complete metabolic panel (CMP) to monitor liver and kidney function while on Bactrim.  Contact information:  [facility] [address] [phone]                              Time Spent on Discharge:  Greater than 30 minutes      Karina Antonio MD  [facility]  11/04/23  11:00 EDT

## 2023-11-04 NOTE — PLAN OF CARE
Goal Outcome Evaluation:    Vss overnight on RA. Pt upadlib to the bathroom. Still having multiple loose bowel movements overnight.

## 2023-11-04 NOTE — OUTREACH NOTE
Prep Survey      Flowsheet Row Responses   [facility] facility patient discharged from? [facility]   Is LACE score < 7 ? No   Eligibility Readm Mgmt   Discharge diagnosis ARF (acute renal failure)   Does the patient have one of the following disease processes/diagnoses(primary or secondary)? Other   Does the patient have Home health ordered? No   Is there a DME ordered? No   Prep survey completed? Yes            Leidy CRUZ - Registered Nurse

## 2023-11-04 NOTE — PLAN OF CARE
Goal Outcome Evaluation:  Plan of Care Reviewed With: patient        Progress: improving  Outcome Evaluation: Diarrhea decreasing per pt. Appetite improving. Given dose of IV antibiotics. No complaints of pain. VSS. Skin intact. Pt to discharge home with family. Discharge instruction and education sheets provided.

## 2023-11-04 NOTE — PROGRESS NOTES
[facility]  Inpatient Progress Note    Reason for Follow Up: Salmonella gastroenteritis    Subjective     Interval History:   Diarrhea firming up, less frequency    Current Facility-Administered Medications:     acetaminophen (TYLENOL) tablet 650 mg, 650 mg, Oral, Q4H PRN, 650 mg at 10/25/23 2043 **OR** acetaminophen (TYLENOL) 160 MG/5ML oral solution 650 mg, 650 mg, Oral, Q4H PRN **OR** acetaminophen (TYLENOL) suppository 650 mg, 650 mg, Rectal, Q4H PRN, Benjy Guevara MD    bismuth subsalicylate (PEPTO BISMOL) chewable tablet 524 mg, 524 mg, Oral, Q6H, Karina Antonio MD, 524 mg at 11/04/23 0931    cefTRIAXone (ROCEPHIN) 2,000 mg in sodium chloride 0.9 % 100 mL IVPB-VTB, 2,000 mg, Intravenous, Q24H, Karina Antonio MD, Last Rate: 200 mL/hr at 11/04/23 1231, 2,000 mg at 11/04/23 1231    Enoxaparin Sodium (LOVENOX) syringe 40 mg, 40 mg, Subcutaneous, Q24H, Karina Antonio MD, 40 mg at 11/03/23 1238    influenza vac split quad (FLUZONE,FLUARIX,AFLURIA,FLULAVAL) injection 0.5 mL, 0.5 mL, Intramuscular, During Hospitalization, Corie West MD    nitroglycerin (NITROSTAT) SL tablet 0.4 mg, 0.4 mg, Sublingual, Q5 Min PRN, Benjy Guevara MD    ondansetron (ZOFRAN) tablet 4 mg, 4 mg, Oral, Q6H PRN **OR** ondansetron (ZOFRAN) injection 4 mg, 4 mg, Intravenous, Q6H PRN, Benjy Guevara MD    pantoprazole (PROTONIX) EC tablet 40 mg, 40 mg, Oral, BID AC, Nemo Chu, APRN, 40 mg at 11/04/23 0636    sodium bicarbonate tablet 650 mg, 650 mg, Oral, TID, Hao Allen MD, 650 mg at 11/04/23 0930    sodium chloride 0.9 % flush 10 mL, 10 mL, Intravenous, Q12H, Benjy Guevara MD, 10 mL at 11/04/23 0931    sodium chloride 0.9 % flush 10 mL, 10 mL, Intravenous, PRN, Benjy Guevara MD    sodium chloride 0.9 % infusion 40 mL, 40 mL, Intravenous, PRN, Benjy Guevara MD  Review of Systems:    There is weakness and fatigue all other systems reviewed  and negative    Objective     Vital Signs  Temp:  [97.3 °F (36.3 °C)-98.2 °F (36.8 °C)] 97.3 °F (36.3 °C)  Heart Rate:  [73-84] 73  Resp:  [18-22] 20  BP: (106-125)/(75-89) 107/80  Body mass index is 28.29 kg/m².  No intake or output data in the 24 hours ending 11/04/23 1309  No intake/output data recorded.     Physical Exam:   General: patient awake, alert and cooperative   Eyes: Normal lids and lashes, no scleral icterus   Neck: supple, normal ROM   Skin: warm and dry, not jaundiced   Cardiovascular: regular rhythm and rate, no murmurs auscultated   Pulm: clear to auscultation bilaterally, regular and unlabored   Abdomen: soft, nontender, nondistended; normal bowel sounds   Extremities: no rash or edema   Psychiatric: Normal mood and behavior; memory intact     Results Review:     I reviewed the patient's new clinical results.    Results from last 7 days   Lab Units 11/04/23 0757 11/03/23  0700 11/02/23  0747   WBC 10*3/mm3 9.16 8.97 12.86*   HEMOGLOBIN g/dL 13.5 12.6* 13.5   HEMATOCRIT % 39.0 36.9* 39.0   PLATELETS 10*3/mm3 329 297 308     Results from last 7 days   Lab Units 11/04/23  0757 11/03/23  0700 11/02/23  0747 11/01/23  0656   SODIUM mmol/L 138 140 136 135*   POTASSIUM mmol/L 3.9 3.6 4.0 3.6   CHLORIDE mmol/L 106 111* 109* 105   CO2 mmol/L 23.9 22.0 18.5* 21.0*   BUN mg/dL 9 13 14 15   CREATININE mg/dL 0.81 0.92 1.10 0.98   CALCIUM mg/dL 9.1 8.3* 8.7 9.0   BILIRUBIN mg/dL  --   --   --  0.5   ALK PHOS U/L  --   --   --  78   ALT (SGPT) U/L  --   --   --  48*   AST (SGOT) U/L  --   --   --  31   GLUCOSE mg/dL 92 112* 93 105*         Lab Results   Lab Value Date/Time    LIPASE 32 10/25/2023 1318       Radiology:  CT Abdomen Pelvis Without Contrast   Final Result   FINDINGS most compatible with mild uncomplicated   diverticulitis of the sigmoid/left colon junction with fluid in the   colon.       This report was finalized on 11/1/2023 8:10 AM by Dr. Guru Donahue M.D   on Workstation: [id number]           XR Abdomen KUB   Final Result          Assessment & Plan     Active Hospital Problems    Diagnosis     **ARF (acute renal failure)     Salmonella gastroenteritis     Salmonella bacteremia     Nausea and vomiting     Dehydration     Diarrhea        Assessment:  Diarrhea  Nausea and vomiting resolved  Abdominal pain resolved  Gram-negative bacteremia  Salmonella gastroenteritis        Plan:     Nursing staff to notify GI service on call if any change in clinical status.  Appreciate antibiotic recommendations per infectious disease  Continue Imodium and Pepto-Bismol in hopes of improving stool frequency  He will ultimately need outpatient follow-up in 4 weeks after discharge from inpatient setting to discuss timing of colonoscopy with hopes of completing prior to the end of the year.  Colonoscopy will be for screening purposes.  He would like to see Dr. Moreno as they have mutual friends  I will schedule a follow-up     I discussed the patients findings and my recommendations with patient and nursing staff.    Cuong Nelson MD

## 2023-11-05 NOTE — PAYOR COMM NOTE
"Luisito Mae (58 y.o. Male)        Please see attached dc summary     Ref#[id number]       THANK YOU    HERNÁN QUINTANA LPN CCP   Date of Birth   1965    Social Security Number       Address   [address] [id number]    Home Phone   [phone]    MRN   [id number]       Anabaptism   None    Marital Status                               Admission Date   10/25/23    Admission Type   Urgent    Admitting Provider   Benjy Guevara MD    Attending Provider       Department, Room/Bed   [address], [address]/       Discharge Date   2023    Discharge Disposition   Home or Self Care    Discharge Destination                                 Attending Provider: (none)   Allergies: No Known Allergies    Isolation: None   Infection: None   Code Status: Prior    Ht: 165.1 cm (65\")   Wt: 77.1 kg (170 lb)    Admission Cmt: None   Principal Problem: ARF (acute renal failure) [N17.9]                   Active Insurance as of 10/25/2023       Primary Coverage       Payor Plan Insurance Group Employer/Plan Group    [facility] BL[employer] [facility] [employer] EM[employer] [id number]       Payor Plan Address Payor Plan Phone Number Payor Plan Fax Number Effective Dates    [address] [phone]  2015 - None Entered    [place] [address]         Subscriber Name Subscriber Birth Date Member ID       LUISITO MAE 1965 [id number]                     Emergency Contacts        (Rel.) Home Phone Work Phone Mobile Phone    Nemo Mae -- -- [phone]                 Discharge Summary        Karina Antonio MD at 23 1100              Patient Name: Luisito Mae  : 1965  MRN: [id number]    Date of Admission: 10/25/2023  Date of Discharge:  2023  Primary Care Physician: Provider, No Known      Chief Complaint:   Nausea, Vomiting, and Diarrhea      Discharge Diagnoses     Active Hospital Problems    Diagnosis  POA    **ARF " (acute renal failure) [N17.9]  Yes    Salmonella gastroenteritis [A02.0]  Yes    Salmonella bacteremia [R78.81]  Unknown    Nausea and vomiting [R11.2]  Yes    Dehydration [E86.0]  Yes    Diarrhea [R19.7]  Yes      Resolved Hospital Problems   No resolved problems to display.        Hospital Course   Mr. Burton is a 58 y.o. who presents to [facility] complaining of diarrhea for about 4 days. He reports frequent nonbloody diarrhea starting Sunday. He then developed nausea and vomiting and was not able to keep food or liquids down due to the nausea. He received zofran as outpatient and this helped the nausea but then he began to have muscle cramping. The cramping was whole body but mostly in his legs and feet.  Patient was diagnosed with MILAGRO and Salmonella enteritis.        Salmonella bacteremia secondary to salmonella enteritis/diarrhea.  Gastrointestinal panel and blood cultures on admission came back positive for Salmonella.  Patient was initiated on IV fluids, antiemetics, IV ceftriaxone, infectious disease was consulted  -Repeat blood cultures 10/28/2023 were negative.  Patient nausea, vomiting, abdominal pain resolved after several days, however patient continued to have significant diarrhea.  Was initiated on high-dose loperamide, Lomotil, Pepto-Bismol, and GI was consulted.  Diarrhea gradually improved and had approximately 9 BMs in 24 hours prior to discharge.  Of note however patient at baseline 3 bowel movements per day.  Discharged home on as needed loperamide, continue Pepto-Bismol, and also Banatrol supplementation twice daily for a week followed by once daily for a week per GI recommendations.  Spouse had already ordered banatrol from the [employer] prior to OR.  Patient was discharged on Bactrim DS 1 tab twice daily for 6 days starting 10/05/2023 to complete 14-day course of antibiotics on 10/10/2023 per infectious disease recommendations.  Will need CMP in 3-4 days to monitor renal  function and liver function while on Bactrim.  Discussed with patient to stay hydrated to decrease risk of MILAGRO.     Acute kidney failure/hypokalemia/hypomagnesemia/metabolic acidosis mostly secondary to prerenal azotemia leading to hypovolemia and subsequent acute tubular necrosis.    Secondary to above, nephrology was consulted, MILAGRO resolved with IV fluids.  Discussed with patient stay hydrated as continues to have diarrhea.  Recommend to follow-up with  PCP and repeat labs in 3 days to monitor renal function.          At the time of discharge patient was told to take all medications as prescribed, keep all follow-up appointments, and call their doctor or return to the hospital with any worsening or concerning symptoms.              Day of Discharge     Subjective:  Patient was seen this morning, patient was walking in the room.   room, reports continued improvement of diarrhea, had approximately 9 bowel movements in 24 hours, decreased compared to 13-14 BMs prior.  Denies abdominal pain, nausea or vomiting.  Reports good appetite.    Physical Exam:  Temp:  [97.3 °F (36.3 °C)-98.2 °F (36.8 °C)] 97.3 °F (36.3 °C)  Heart Rate:  [73-84] 73  Resp:  [18-22] 20  BP: (106-125)/(75-89) 107/80  Body mass index is 28.29 kg/m².  Physical Exam    General: Alert and oriented x3, no acute distress  HEENT: Normocephalic, atraumatic  CV: Regular rate and rhythm, no murmurs rubs or gallops  Lungs: Clear to auscultation bilaterally, no crackles or wheezes  Abdomen: Soft, nontender, nondistended  Extremities: No significant peripheral edema , no cyanosis     Consultants     Consult Orders (all) (From admission, onward)       Start     Ordered    10/31/23 0827  Inpatient Gastroenterology Consult  Once        Specialty:  Gastroenterology  Provider:  Cuong Arias MD    10/31/23 0826    10/29/23 0351  Inpatient Case Management  Consult  Once        Provider:  (Not yet assigned)    10/29/23 0350    10/27/23 1258   Inpatient Infectious Diseases Consult  Once        Specialty:  Infectious Diseases  Provider:  Robin Donovan MD    10/27/23 1254    10/25/23 1805  Inpatient Nephrology Consult  Once        Specialty:  Nephrology  Provider:  Hao Allen MD    10/25/23 1810                  Procedures     * Surgery not found *      Imaging Results (All)       Procedure Component Value Units Date/Time    CT Abdomen Pelvis Without Contrast [[id number]] Collected: 11/01/23 0806     Updated: 11/01/23 0813    Narrative:      Examination: CT of the abdomen and pelvis without contrast     Technique: CT of the abdomen and pelvis without contrast per protocol.  Radiation dose reduction techniques were utilized, including automated  exposure control and exposure modulation based on body size.        History: Abdominal infection     Comparison: None available     Findings: Limited evaluation demonstrates atelectasis and scarring at  the lung bases, without consolidation, pleural effusion, or thorax. The  heart is normal in size, without pericardial effusion.     There is hepatic steatosis. Duodenal diverticulum is seen. A large  amount of fluid is seen in the colon. Colonic diverticula are seen with  fat stranding around diverticula near the sigmoid and left colon  junction..     The gallbladder, spleen, adrenal glands, kidneys, pancreas, stomach,  small bowel, appendix, urinary bladder, and abdominal vasculature are  normal as evaluated on this noncontrast lesion. No intraperitoneal fluid  collection or free gas are seen. No enlarged lymph nodes are  demonstrated.     Bone windows demonstrate degenerative changes, without suspicious  osseous lesion seen.       Impression:      FINDINGS most compatible with mild uncomplicated  diverticulitis of the sigmoid/left colon junction with fluid in the  colon.     This report was finalized on 11/1/2023 8:10 AM by Dr. Guru Donahue M.D  on Workstation: [id number]       XR Abdomen KUB  "[[id number]] Collected: 10/29/23 1527     Updated: 10/29/23 1742    Narrative:      XR ABDOMEN KUB-     HISTORY: 58-year-old male with abdominal pain and diarrhea.     FINDINGS: There is a paucity of formed stool within the colon. There is  no evidence for bowel obstruction.     This report was finalized on 10/29/2023 5:39 PM by Dr. Bre Dowling M.D  on Workstation: [id number]                 Pertinent Labs     Results from last 7 days   Lab Units 11/04/23 0757 11/03/23 0700 11/02/23 0747 11/01/23 0656   WBC 10*3/mm3 9.16 8.97 12.86* 11.21*   HEMOGLOBIN g/dL 13.5 12.6* 13.5 13.7   PLATELETS 10*3/mm3 329 297 308 304     Results from last 7 days   Lab Units 11/04/23 0757 11/03/23 0700 11/02/23 0747 11/01/23  0656   SODIUM mmol/L 138 140 136 135*   POTASSIUM mmol/L 3.9 3.6 4.0 3.6   CHLORIDE mmol/L 106 111* 109* 105   CO2 mmol/L 23.9 22.0 18.5* 21.0*   BUN mg/dL 9 13 14 15   CREATININE mg/dL 0.81 0.92 1.10 0.98   GLUCOSE mg/dL 92 112* 93 105*   Estimated Creatinine Clearance: 97.5 mL/min (by C-G formula based on SCr of 0.81 mg/dL).  Results from last 7 days   Lab Units 11/04/23 0757 11/03/23 0700 11/02/23 0747 11/01/23  0656   ALBUMIN g/dL 3.6 3.2* 3.6 3.4*  3.5   BILIRUBIN mg/dL  --   --   --  0.5   ALK PHOS U/L  --   --   --  78   AST (SGOT) U/L  --   --   --  31   ALT (SGPT) U/L  --   --   --  48*     Results from last 7 days   Lab Units 11/04/23 0757 11/03/23 0700 11/02/23 0747 11/01/23  0656   CALCIUM mg/dL 9.1 8.3* 8.7 9.0   ALBUMIN g/dL 3.6 3.2* 3.6 3.4*  3.5   MAGNESIUM mg/dL 1.9 1.8 2.1 2.1   PHOSPHORUS mg/dL 2.5 2.3* 2.5 2.4*               Invalid input(s): \"LDLCALC\"          Test Results Pending at Discharge     Pending Labs       Order Current Status    Stool Culture, Targeted - Stool, Per Rectum Preliminary result            Discharge Details        Discharge Medications        New Medications        Instructions Start Date   bismuth subsalicylate 262 MG chewable tablet  Commonly known as: " PEPTO BISMOL   524 mg, Oral, Every 6 Hours, Hold if constipated.      loperamide 2 MG capsule  Commonly known as: IMODIUM   4 mg, Oral, 4 Times Daily PRN      sulfamethoxazole-trimethoprim 800-160 MG per tablet  Commonly known as: Bactrim DS   1 tablet, Oral, 2 Times Daily   Start Date: November 5, 2023            Continue These Medications        Instructions Start Date   ondansetron 8 MG tablet  Commonly known as: ZOFRAN   8 mg, Oral, Every 8 Hours PRN               No Known Allergies    Discharge Disposition:  Home or Self Care      Discharge Diet:  Diet Order   Procedures    Diet: Gastrointestinal Diets; Lactose-Controlled; Texture: Regular Texture (IDDSI 7); Fluid Consistency: Thin (IDDSI 0)       Discharge Activity:       CODE STATUS:    Code Status and Medical Interventions:   Ordered at: 10/25/23 1811     Code Status (Patient has no pulse and is not breathing):    CPR (Attempt to Resuscitate)     Medical Interventions (Patient has pulse or is breathing):    Full Support     Comments:    plesae confirm with patient/medical decision maker       No future appointments.   Follow-up Information       Cuong Nelson MD. Schedule an appointment as soon as possible for a visit.    Specialty: Gastroenterology  Why: follow up in 4-6 weeks  Contact information:  [facility] [address] [id number] [phone]               Provider, No Known Follow up in 1 week(s).    Why: Follow-up with PCP in 3 days, will need complete metabolic panel (CMP) to monitor liver and kidney function while on Bactrim.  Contact information:  [facility] [address] [phone]                             Time Spent on Discharge:  Greater than 30 minutes      Karina Antonio MD  [facility]  11/04/23  11:00 EDT                Electronically signed by Karina Antonio MD at 11/04/23 1114       Discharge Order (From admission, onward)       Start     Ordered    11/04/23 0859   Discharge patient  Once        Comments: Can be discharged after IV antibiotics administered.   Expected Discharge Date: 11/04/23   Discharge Disposition: Home or Self Care   Physician of Record for Attribution - Please select from Treatment Team: SUKHWINDER BERNABE [[id number]]   Review needed by CMO to determine Physician of Record: No      Question Answer Comment   Physician of Record for Attribution - Please select from Treatment Team SUKHWINDER BERNABE    Review needed by CMO to determine Physician of Record No        11/04/23 0859

## 2023-11-07 ENCOUNTER — READMISSION MANAGEMENT (OUTPATIENT)
Dept: CALL CENTER | Facility: HOSPITAL | Age: 58
End: 2023-11-07
Payer: COMMERCIAL

## 2023-11-07 NOTE — OUTREACH NOTE
Medical Week 1 Survey      Flowsheet Row Responses   [facility] patient discharged from? [place]   Does the patient have one of the following disease processes/diagnoses(primary or secondary)? Other   Week 1 attempt successful? No   Unsuccessful attempts Attempt 1            JEFFRY SY - Registered Nurse

## 2023-11-14 ENCOUNTER — READMISSION MANAGEMENT (OUTPATIENT)
Dept: CALL CENTER | Facility: HOSPITAL | Age: 58
End: 2023-11-14
Payer: COMMERCIAL

## 2023-11-14 NOTE — OUTREACH NOTE
Medical Week 2 Survey      Flowsheet Row Responses   [facility] patient discharged from? [place]   Does the patient have one of the following disease processes/diagnoses(primary or secondary)? Other   Week 2 attempt successful? Yes   Call start time 0923   Discharge diagnosis ARF (acute renal failure)   Call end time 0928   Meds reviewed with patient/caregiver? Yes   Is the patient having any side effects they believe may be caused by any medication additions or changes? No   Does the patient have all medications ordered at discharge? Yes   Is the patient taking all medications as directed (includes completed medication regime)? Yes   Does the patient have a primary care provider?  Yes   Has the patient kept scheduled appointments due by today? Yes   Comments Has a, ready followed up with PCP and has appt with GI   Psychosocial issues? No   Did the patient receive a copy of their discharge instructions? Yes   Nursing interventions Reviewed instructions with patient   What is the patient's perception of their health status since discharge? Improving   Is the patient/caregiver able to teach back signs and symptoms related to disease process for when to call PCP? Yes   Is the patient/caregiver able to teach back signs and symptoms related to disease process for when to call 911? Yes   Is the patient/caregiver able to teach back the hierarchy of who to call/visit for symptoms/problems? PCP, Specialist, Home health nurse, Urgent Care, ED, 911 Yes   Week 2 Call Completed? Yes   Graduated Yes   Graduated/Revoked comments States he has improved and doing well with no questions or needs at this time.   Call end time 0928            Thalia ROBERTSON - Licensed Nurse

## 2023-11-17 LAB — BACTERIA SPEC AEROBE CULT: ABNORMAL

## 2024-01-12 ENCOUNTER — TELEPHONE (OUTPATIENT)
Dept: GASTROENTEROLOGY | Facility: CLINIC | Age: 59
End: 2024-01-12
Payer: COMMERCIAL

## 2024-01-12 NOTE — TELEPHONE ENCOUNTER
Lvm for patient to call office back and reschedule appointment due being consulted by  and  not taking over care for current providers in office.

## 2024-01-12 NOTE — TELEPHONE ENCOUNTER
Provider: DR MONTOYA    Caller: ROCKY    Relationship to Patient: SELF    Phone Number: [phone]    Reason for Call: PATIENT CALLED IN, HE SAID THAT WHEN DR MONTOYA CAME IN HIS HOSPITAL ROOM FOR A MOMENT HE TOLD DR MONTOYA HE WANTED TO SEE DR GARDNER IN OFFICE HE HE WAS TOLD BY THE DR THAT WAS FINE TO JUST CALL IN AND GET SCHEDULED WITH DR GARDNER. THE PATIENT WANTS TO STAY SCHEDULED WITH DR GARDNER AND WOULD LIKE A CALL BACK PLEASE TO DISCUSS THIS. YOU CAN LEAVE A VMAIL IF YOU CAN'T REACH HIM.

## 2024-01-22 ENCOUNTER — OFFICE VISIT (OUTPATIENT)
Dept: GASTROENTEROLOGY | Facility: CLINIC | Age: 59
End: 2024-01-22
Payer: COMMERCIAL

## 2024-01-22 VITALS
TEMPERATURE: 97.7 F | HEIGHT: 65 IN | HEART RATE: 89 BPM | OXYGEN SATURATION: 98 % | WEIGHT: 184.1 LBS | BODY MASS INDEX: 30.67 KG/M2

## 2024-01-22 DIAGNOSIS — Z12.11 ENCOUNTER FOR SCREENING FOR MALIGNANT NEOPLASM OF COLON: Primary | ICD-10-CM

## 2024-01-22 PROCEDURE — 99213 OFFICE O/P EST LOW 20 MIN: CPT | Performed by: INTERNAL MEDICINE

## 2024-01-22 RX ORDER — LOPERAMIDE HYDROCHLORIDE 2 MG/1
CAPSULE ORAL
COMMUNITY
Start: 2023-11-04

## 2024-01-22 RX ORDER — SODIUM CHLORIDE, SODIUM LACTATE, POTASSIUM CHLORIDE, CALCIUM CHLORIDE 600; 310; 30; 20 MG/100ML; MG/100ML; MG/100ML; MG/100ML
30 INJECTION, SOLUTION INTRAVENOUS CONTINUOUS
OUTPATIENT
Start: 2024-01-22

## 2024-01-22 RX ORDER — SULFAMETHOXAZOLE AND TRIMETHOPRIM 800; 160 MG/1; MG/1
TABLET ORAL
COMMUNITY
Start: 2023-10-28

## 2024-01-22 NOTE — PROGRESS NOTES
Chief Complaint   Patient presents with    Salmonella gastroenteritis    Diarrhea    Abdominal Pain     Subjective     HPI  Luisito Burton is a 58 y.o. male who presents today for office follow up.     This patient is new to me today but was seen by our group during hospitalization late last year for Salmonella gastroenteritis.  This was most likely acquired through eating farm raised eggs.  He had a protracted hospital course nearly 10 days requiring IV antibiotics with eventual improvement in his symptoms.  Currently doing well without much in the way of residual issues.  Is really here today to get himself scheduled for colonoscopy he is 58 has never had prior colonoscopy.  No known family history of polyps or colorectal cancer.    Objective   Vitals:    01/22/24 1329   Pulse: 89   Temp: 97.7 °F (36.5 °C)   SpO2: 98%       Physical Exam  Vitals reviewed.   Constitutional:       Appearance: He is well-developed.   HENT:      Head: Normocephalic and atraumatic.   Neurological:      Mental Status: He is alert and oriented to person, place, and time.   Psychiatric:         Behavior: Behavior normal.         Thought Content: Thought content normal.         Judgment: Judgment normal.       The following data was reviewed by: Cuong Arias MD on 01/22/2024:  Common labs          11/2/2023    07:47 11/3/2023    07:00 11/4/2023    07:57   Common Labs   Glucose 93  112  92    BUN 14  13  9    Creatinine 1.10  0.92  0.81    Sodium 136  140  138    Potassium 4.0  3.6  3.9    Chloride 109  111  106    Calcium 8.7  8.3  9.1    Albumin 3.6  3.2  3.6    WBC 12.86  8.97  9.16    Hemoglobin 13.5  12.6  13.5    Hematocrit 39.0  36.9  39.0    Platelets 308  297  329      Data reviewed : Recent hospitalization notes from 2023 reviewed      Assessment & Plan   Assessment:     1. Encounter for screening for malignant neoplasm of colon    2.     Salmonella gastroenteritis    Plan:   Pt doing well with regards to his prior  episode of salmonella gastroenteritis  He is overdue for screening colonoscopy which we will scheduled for him today          Cuong Arias M.D.  [facility]  [address] [place] [address]  Office: [phone]

## 2024-02-22 ENCOUNTER — TELEPHONE (OUTPATIENT)
Dept: GASTROENTEROLOGY | Facility: CLINIC | Age: 59
End: 2024-02-22
Payer: COMMERCIAL

## 2024-02-28 ENCOUNTER — ANESTHESIA EVENT (OUTPATIENT)
Dept: GASTROENTEROLOGY | Facility: HOSPITAL | Age: 59
End: 2024-02-28
Payer: COMMERCIAL

## 2024-02-28 ENCOUNTER — HOSPITAL ENCOUNTER (OUTPATIENT)
Facility: HOSPITAL | Age: 59
Setting detail: HOSPITAL OUTPATIENT SURGERY
Discharge: HOME | End: 2024-02-28
Attending: INTERNAL MEDICINE | Admitting: INTERNAL MEDICINE
Payer: COMMERCIAL

## 2024-02-28 ENCOUNTER — ANESTHESIA (OUTPATIENT)
Dept: GASTROENTEROLOGY | Facility: HOSPITAL | Age: 59
End: 2024-02-28
Payer: COMMERCIAL

## 2024-02-28 VITALS
SYSTOLIC BLOOD PRESSURE: 114 MMHG | OXYGEN SATURATION: 95 % | BODY MASS INDEX: 30.42 KG/M2 | RESPIRATION RATE: 16 BRPM | HEART RATE: 72 BPM | HEIGHT: 65 IN | WEIGHT: 182.6 LBS | DIASTOLIC BLOOD PRESSURE: 91 MMHG

## 2024-02-28 DIAGNOSIS — Z12.11 ENCOUNTER FOR SCREENING FOR MALIGNANT NEOPLASM OF COLON: ICD-10-CM

## 2024-02-28 PROCEDURE — 25810000003 LACTATED RINGERS PER 1000 ML: Performed by: INTERNAL MEDICINE

## 2024-02-28 PROCEDURE — 25010000002 PROPOFOL 10 MG/ML EMULSION: Performed by: NURSE ANESTHETIST, CERTIFIED REGISTERED

## 2024-02-28 PROCEDURE — 45385 COLONOSCOPY W/LESION REMOVAL: CPT | Performed by: INTERNAL MEDICINE

## 2024-02-28 PROCEDURE — 88305 TISSUE EXAM BY PATHOLOGIST: CPT | Performed by: INTERNAL MEDICINE

## 2024-02-28 RX ORDER — PROPOFOL 10 MG/ML
VIAL (ML) INTRAVENOUS AS NEEDED
Status: DISCONTINUED | OUTPATIENT
Start: 2024-02-28 | End: 2024-02-28 | Stop reason: SURG

## 2024-02-28 RX ORDER — SODIUM CHLORIDE, SODIUM LACTATE, POTASSIUM CHLORIDE, CALCIUM CHLORIDE 600; 310; 30; 20 MG/100ML; MG/100ML; MG/100ML; MG/100ML
30 INJECTION, SOLUTION INTRAVENOUS CONTINUOUS
Status: DISCONTINUED | OUTPATIENT
Start: 2024-02-28 | End: 2024-02-28 | Stop reason: HOSPADM

## 2024-02-28 RX ORDER — SODIUM CHLORIDE 0.9 % (FLUSH) 0.9 %
10 SYRINGE (ML) INJECTION AS NEEDED
Status: DISCONTINUED | OUTPATIENT
Start: 2024-02-28 | End: 2024-02-28 | Stop reason: HOSPADM

## 2024-02-28 RX ORDER — SODIUM CHLORIDE, SODIUM LACTATE, POTASSIUM CHLORIDE, CALCIUM CHLORIDE 600; 310; 30; 20 MG/100ML; MG/100ML; MG/100ML; MG/100ML
1000 INJECTION, SOLUTION INTRAVENOUS CONTINUOUS
Status: DISCONTINUED | OUTPATIENT
Start: 2024-02-28 | End: 2024-02-28 | Stop reason: HOSPADM

## 2024-02-28 RX ORDER — LIDOCAINE HYDROCHLORIDE 20 MG/ML
INJECTION, SOLUTION INFILTRATION; PERINEURAL AS NEEDED
Status: DISCONTINUED | OUTPATIENT
Start: 2024-02-28 | End: 2024-02-28 | Stop reason: SURG

## 2024-02-28 RX ADMIN — SODIUM CHLORIDE, POTASSIUM CHLORIDE, SODIUM LACTATE AND CALCIUM CHLORIDE 1000 ML: 600; 310; 30; 20 INJECTION, SOLUTION INTRAVENOUS at 13:40

## 2024-02-28 RX ADMIN — PROPOFOL 100 MG: 10 INJECTION, EMULSION INTRAVENOUS at 14:08

## 2024-02-28 RX ADMIN — PROPOFOL 140 MCG/KG/MIN: 10 INJECTION, EMULSION INTRAVENOUS at 14:09

## 2024-02-28 RX ADMIN — LIDOCAINE HYDROCHLORIDE 80 MG: 20 INJECTION, SOLUTION INFILTRATION; PERINEURAL at 14:07

## 2024-02-28 NOTE — H&P
"[facility]  Pre Procedure History & Physical    Chief Complaint:   Time for my colonoscopy    Subjective     HPI:   58 y.o. male presenting to endoscopy unit today for screening colonoscopy.    Past Medical History:   Past Medical History:   Diagnosis Date    Cancer     Skin cancer    Hyperlipidemia        Family History:  Family History   Problem Relation Age of Onset    Arthritis Father     Priya Hyperthermia Neg Hx        Social History:   reports that he has never smoked. He has never used smokeless tobacco. He reports current alcohol use of about 4.0 standard drinks of alcohol per week. He reports that he does not use drugs.    Medications:   Medications Prior to Admission   Medication Sig Dispense Refill Last Dose    loperamide (IMODIUM) 2 MG capsule  (Patient not taking: Reported on 1/22/2024)       sulfamethoxazole-trimethoprim (Bactrim DS) 800-160 MG per tablet  (Patient not taking: Reported on 1/22/2024)          Allergies:  Patient has no known allergies.      Objective     Blood pressure 136/96, pulse 81, resp. rate 16, height 165.1 cm (65\"), weight 82.8 kg (182 lb 9.6 oz), SpO2 94%.  Physical Exam:   General: patient awake, alert and cooperative    Assessment & Plan     Diagnosis:  Encounter for screening for colon cancer    Anticipated Surgical Procedure:  Colonoscopy    The risks, benefits, and alternatives of this procedure have been discussed with the patient or the responsible party- the patient understands and agrees to proceed.                                                                  "

## 2024-02-28 NOTE — ANESTHESIA POSTPROCEDURE EVALUATION
Patient: Luisito Burton    Procedure Summary       Date: 02/28/24 Room / Location: [facility] ENDOSCOPY 10 / [facility] ENDOSCOPY    Anesthesia Start: 1406 Anesthesia Stop: 1429    Procedure: COLONOSCOPY to cecum with cold snare polypectomies Diagnosis:       Encounter for screening for malignant neoplasm of colon      (Encounter for screening for malignant neoplasm of colon [Z12.11])    Surgeons: Cuong Arias MD Provider: Kevin Garibay MD    Anesthesia Type: MAC ASA Status: 2            Anesthesia Type: MAC    Vitals  Vitals Value Taken Time   /85 02/28/24 1440   Temp     Pulse 77 02/28/24 1449   Resp 16 02/28/24 1438   SpO2 96 % 02/28/24 1449   Vitals shown include unfiled device data.        Post Anesthesia Care and Evaluation    Patient location during evaluation: PACU  Patient participation: complete - patient participated  Level of consciousness: awake and alert  Pain management: adequate    Airway patency: patent  Anesthetic complications: No anesthetic complications    Cardiovascular status: acceptable  Respiratory status: acceptable  Hydration status: acceptable    Comments: --------------------            02/28/24               1438     --------------------   BP:       110/79     Pulse:      75       Resp:       16       SpO2:      97%      --------------------

## 2024-02-28 NOTE — ANESTHESIA PREPROCEDURE EVALUATION
Anesthesia Evaluation     Patient summary reviewed and Nursing notes reviewed                Airway   Mallampati: III  TM distance: >3 FB  Neck ROM: full  Dental      Pulmonary - negative pulmonary ROS   Cardiovascular     Rhythm: regular  Rate: normal    (+) hyperlipidemia      Neuro/Psych- negative ROS  GI/Hepatic/Renal/Endo    (+) obesity, renal disease-    Musculoskeletal (-) negative ROS    Abdominal    Substance History   (+) alcohol use     OB/GYN negative ob/gyn ROS         Other      history of cancer                  Anesthesia Plan    ASA 2     MAC     intravenous induction     Anesthetic plan, risks, benefits, and alternatives have been provided, discussed and informed consent has been obtained with: patient.    Plan discussed with CRNA.    CODE STATUS:

## 2024-02-29 LAB
LAB AP CASE REPORT: NORMAL
PATH REPORT.FINAL DX SPEC: NORMAL
PATH REPORT.GROSS SPEC: NORMAL

## 2024-06-19 ENCOUNTER — PATIENT ROUNDING (BHMG ONLY) (OUTPATIENT)
Dept: URGENT CARE | Facility: CLINIC | Age: 59
End: 2024-06-19
Payer: COMMERCIAL

## 2024-06-19 NOTE — ED NOTES
Thank you for letting us care for you at your recent visit to [facility]. We would love to hear about your experience with us. Was this the first time you have visited our location?    We’re always looking for ways to make our patients’ experience even better. Do you have any recommendations on ways we may improve?     Please be on the lookout for a survey about your recent visit from Monae Vazquez via text or email. We would greatly appreciate if you could fill that out and turn it back in. We want your voice to be heard and we value your feedback.     Thank you for choosing [facility] for your healthcare needs. I appreciate you taking the time to respond!

## (undated) DEVICE — KT ORCA ORCAPOD DISP STRL

## (undated) DEVICE — CANN O2 ETCO2 FITS ALL CONN CO2 SMPL A/ 7IN DISP LF

## (undated) DEVICE — ADAPT CLN BIOGUARD AIR/H2O DISP

## (undated) DEVICE — SNAR POLYP CAPTIVATOR RND STFF 2.4 240CM 10MM 1P/U

## (undated) DEVICE — LN SMPL CO2 SHTRM SD STREAM W/M LUER

## (undated) DEVICE — TBG SXN CONN/F UNIV 1/4IN 10FT LF STRL

## (undated) DEVICE — SENSR O2 OXIMAX FNGR A/ 18IN NONSTR

## (undated) DEVICE — TRAP POLYP ETRAP 2PK